# Patient Record
Sex: FEMALE | Race: WHITE | NOT HISPANIC OR LATINO | Employment: FULL TIME | URBAN - METROPOLITAN AREA
[De-identification: names, ages, dates, MRNs, and addresses within clinical notes are randomized per-mention and may not be internally consistent; named-entity substitution may affect disease eponyms.]

---

## 2017-04-06 ENCOUNTER — OFFICE VISIT (OUTPATIENT)
Dept: PEDIATRICS | Facility: CLINIC | Age: 27
End: 2017-04-06
Payer: COMMERCIAL

## 2017-04-06 VITALS
OXYGEN SATURATION: 99 % | WEIGHT: 165 LBS | TEMPERATURE: 97.8 F | HEART RATE: 78 BPM | SYSTOLIC BLOOD PRESSURE: 102 MMHG | HEIGHT: 66 IN | BODY MASS INDEX: 26.52 KG/M2 | DIASTOLIC BLOOD PRESSURE: 62 MMHG

## 2017-04-06 DIAGNOSIS — Z13.1 SCREENING FOR DIABETES MELLITUS: ICD-10-CM

## 2017-04-06 DIAGNOSIS — Z13.220 LIPID SCREENING: Primary | ICD-10-CM

## 2017-04-06 DIAGNOSIS — F32.5 MAJOR DEPRESSION IN COMPLETE REMISSION (H): ICD-10-CM

## 2017-04-06 PROCEDURE — 36415 COLL VENOUS BLD VENIPUNCTURE: CPT | Performed by: PEDIATRICS

## 2017-04-06 PROCEDURE — 82947 ASSAY GLUCOSE BLOOD QUANT: CPT | Performed by: PEDIATRICS

## 2017-04-06 PROCEDURE — 99213 OFFICE O/P EST LOW 20 MIN: CPT | Performed by: PEDIATRICS

## 2017-04-06 PROCEDURE — 80061 LIPID PANEL: CPT | Performed by: PEDIATRICS

## 2017-04-06 ASSESSMENT — ANXIETY QUESTIONNAIRES
6. BECOMING EASILY ANNOYED OR IRRITABLE: SEVERAL DAYS
GAD7 TOTAL SCORE: 1
5. BEING SO RESTLESS THAT IT IS HARD TO SIT STILL: NOT AT ALL
IF YOU CHECKED OFF ANY PROBLEMS ON THIS QUESTIONNAIRE, HOW DIFFICULT HAVE THESE PROBLEMS MADE IT FOR YOU TO DO YOUR WORK, TAKE CARE OF THINGS AT HOME, OR GET ALONG WITH OTHER PEOPLE: NOT DIFFICULT AT ALL
3. WORRYING TOO MUCH ABOUT DIFFERENT THINGS: NOT AT ALL
1. FEELING NERVOUS, ANXIOUS, OR ON EDGE: NOT AT ALL
2. NOT BEING ABLE TO STOP OR CONTROL WORRYING: NOT AT ALL
7. FEELING AFRAID AS IF SOMETHING AWFUL MIGHT HAPPEN: NOT AT ALL

## 2017-04-06 ASSESSMENT — PATIENT HEALTH QUESTIONNAIRE - PHQ9: 5. POOR APPETITE OR OVEREATING: NOT AT ALL

## 2017-04-06 NOTE — MR AVS SNAPSHOT
"              After Visit Summary   4/6/2017    Celia Castro    MRN: [id number]           Patient Information     Date Of Birth          1990        Visit Information        Provider Department      4/6/2017 1:40 PM Rissa Serrano MD [facility] [place]        Care Instructions    Okay to stay off prozac.    Watch for return of symptoms this fall and let me know if things are changing.    Exercise and diet!        Follow-ups after your visit        Who to contact     If you have questions or need follow up information about today's clinic visit or your schedule please contact [facility] directly at [phone].  Normal or non-critical lab and imaging results will be communicated to you by LLLerhart, letter or phone within 4 business days after the clinic has received the results. If you do not hear from us within 7 days, please contact the clinic through Postinit or phone. If you have a critical or abnormal lab result, we will notify you by phone as soon as possible.  Submit refill requests through XanEdu or call your pharmacy and they will forward the refill request to us. Please allow 3 business days for your refill to be completed.          Additional Information About Your Visit        MyChart Information     XanEdu gives you secure access to your electronic health record. If you see a primary care provider, you can also send messages to your care team and make appointments. If you have questions, please call your primary care clinic.  If you do not have a primary care provider, please call [phone] and they will assist you.        Care EveryWhere ID     This is your Care EveryWhere ID. This could be used by other organizations to access your [facility] medical records  [id number]        Your Vitals Were     Pulse Temperature Height Pulse Oximetry BMI (Body Mass Index)       78 97.8  F (36.6  C) (Oral) 5' 6\" (1.676 m) 99% 26.63 kg/m2        Blood Pressure from Last 3 Encounters: "   04/06/17 102/62   12/16/16 128/78   02/29/16 102/64    Weight from Last 3 Encounters:   04/06/17 165 lb (74.8 kg)   12/16/16 158 lb (71.7 kg)   02/29/16 153 lb 8 oz (69.6 kg)              Today, you had the following     No orders found for display       Primary Care Provider Office Phone # Fax #    Rissa Serrano [phone]       [facility] [place] [address] [place] [address]        Thank you!     Thank you for choosing [facility]  for your care. Our goal is always to provide you with excellent care. Hearing back from our patients is one way we can continue to improve our services. Please take a few minutes to complete the written survey that you may receive in the mail after your visit with us. Thank you!             Your Updated Medication List - Protect others around you: Learn how to safely use, store and throw away your medicines at www.disposemymeds.org.          This list is accurate as of: 4/6/17  2:01 PM.  Always use your most recent med list.                   Brand Name Dispense Instructions for use    acyclovir 400 MG tablet    ZOVIRAX    90 tablet    Take 1 tablet (400 mg) by mouth daily , 1 tab twice daily during outbreak       FLUoxetine 40 MG capsule    PROzac    90 capsule    Take 1 capsule (40 mg) by mouth daily       levonorgestrel 20 MCG/24HR IUD    MIRENA    1 each    1 each (20 mcg) by Intrauterine route once for 1 dose       MELATONIN PO          ondansetron 4 MG ODT tab    ZOFRAN ODT    20 tablet    Take 1-2 tablets (4-8 mg) by mouth every 8 hours as needed for nausea

## 2017-04-06 NOTE — PATIENT INSTRUCTIONS
Okay to stay off prozac.    Watch for return of symptoms this fall and let me know if things are changing.    Exercise and diet!

## 2017-04-06 NOTE — PROGRESS NOTES
"  SUBJECTIVE:                                                    Celia Castro is a 26 year old female who presents to clinic today for the following health issues:        Depression and Anxiety Follow-Up    Status since last visit: Improved - she stopped the medicine about a month ago because she ran out, but has actually been doing quite well off of it.  At first was a little tearful during a sad song, but even that is improving.  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  She is also doing a fitness challenge which is keeping her accountable to exercise.     Other associated symptoms: Feels she gets tearful easier, but feels it is going away    Complicating factors:     Significant life event: No     Current substance abuse: None    PHQ-9 SCORE 10/16/2015 2/29/2016 12/16/2016   Total Score - - -   Total Score MyChart - - -   Total Score 6 0 5     JUDAH-7 SCORE 11/7/2014 10/16/2015 12/16/2016   Total Score 1 - -   Total Score - 0 0        PHQ-9  English      PHQ-9   Any Language     GAD7       Amount of exercise or physical activity: 3-4 days/week for an average of 30-45 minutes    Problems taking medications regularly: NA    Medication side effects: None    Diet: regular (no restrictions)    Problem list and histories reviewed & adjusted, as indicated.  Additional history: as documented    Reviewed and updated as needed this visit by clinical staff       Reviewed and updated as needed this visit by Provider         ROS:  Constitutional, HEENT, cardiovascular, pulmonary, gi and gu systems are negative, except as otherwise noted.    OBJECTIVE:                                                    /62 (BP Location: Right arm, Cuff Size: Adult Regular)  Pulse 78  Temp 97.8  F (36.6  C) (Oral)  Ht 5' 6\" (1.676 m)  Wt 165 lb (74.8 kg)  SpO2 99%  BMI 26.63 kg/m2  Body mass index is 26.63 kg/(m^2).  NA    Diagnostic Test Results:  none  "     ASSESSMENT/PLAN:                                                        1. Lipid screening  - Lipid Profile (Chol, Trig, HDL, LDL calc)    2. Screening for diabetes mellitus  - Glucose    3. Major depression in complete remission (H)  Doing very well off prozac.  Will continues w/o medications. Declines therapy.  Discussed watching for mood changes in the fall.    Patient to return in 1-2 months for physical w/ PAP.  Doing fasting labs today.      Patient Instructions   Okay to stay off prozac.    Watch for return of symptoms this fall and let me know if things are changing.    Exercise and diet!      Rissa Serrano MD  [facility]

## 2017-04-06 NOTE — NURSING NOTE
"Chief Complaint   Patient presents with     Depression     Anxiety       Initial /62 (BP Location: Right arm, Cuff Size: Adult Regular)  Pulse 78  Temp 97.8  F (36.6  C) (Oral)  Ht 5' 6\" (1.676 m)  Wt 165 lb (74.8 kg)  SpO2 99%  BMI 26.63 kg/m2 Estimated body mass index is 26.63 kg/(m^2) as calculated from the following:    Height as of this encounter: 5' 6\" (1.676 m).    Weight as of this encounter: 165 lb (74.8 kg).  Medication Reconciliation: complete     Terri Prescott    "

## 2017-04-07 LAB
CHOLEST SERPL-MCNC: 213 MG/DL
GLUCOSE SERPL-MCNC: 80 MG/DL (ref 70–99)
HDLC SERPL-MCNC: 75 MG/DL
LDLC SERPL CALC-MCNC: 127 MG/DL
NONHDLC SERPL-MCNC: 138 MG/DL
TRIGL SERPL-MCNC: 53 MG/DL

## 2017-04-07 ASSESSMENT — PATIENT HEALTH QUESTIONNAIRE - PHQ9: SUM OF ALL RESPONSES TO PHQ QUESTIONS 1-9: 3

## 2017-04-07 ASSESSMENT — ANXIETY QUESTIONNAIRES: GAD7 TOTAL SCORE: 1

## 2017-07-10 ENCOUNTER — E-VISIT (OUTPATIENT)
Dept: PEDIATRICS | Facility: CLINIC | Age: 27
End: 2017-07-10
Payer: COMMERCIAL

## 2017-07-10 DIAGNOSIS — F32.1 MODERATE SINGLE CURRENT EPISODE OF MAJOR DEPRESSIVE DISORDER (H): Primary | ICD-10-CM

## 2017-07-10 DIAGNOSIS — F41.9 ANXIETY: ICD-10-CM

## 2017-07-10 PROCEDURE — 99444 ZZC PHYSICIAN ONLINE EVALUATION & MANAGEMENT SERVICE: CPT | Performed by: PEDIATRICS

## 2017-07-11 PROBLEM — F32.1 MODERATE SINGLE CURRENT EPISODE OF MAJOR DEPRESSIVE DISORDER (H): Status: ACTIVE | Noted: 2017-07-11

## 2017-07-11 PROBLEM — F32.5 MAJOR DEPRESSION IN COMPLETE REMISSION (H): Status: RESOLVED | Noted: 2017-04-06 | Resolved: 2017-07-11

## 2017-07-16 DIAGNOSIS — R11.0 NAUSEA: ICD-10-CM

## 2017-07-16 NOTE — TELEPHONE ENCOUNTER
ondansetron (ZOFRAN ODT) 4 MG disintegrating tablet     Last Written Prescription Date: 10/31/2016  Last Fill Quantity: 20,  # refills: 1   Last Office Visit with [facility], [facility] or [facility] prescribing provider: 4/6/2017

## 2017-07-18 RX ORDER — ONDANSETRON 4 MG/1
4-8 TABLET, ORALLY DISINTEGRATING ORAL EVERY 8 HOURS PRN
Qty: 20 TABLET | Refills: 1 | Status: SHIPPED | OUTPATIENT
Start: 2017-07-18 | End: 2018-06-10

## 2017-07-18 NOTE — TELEPHONE ENCOUNTER
Routing refill request to provider for review/approval because:  A break in medication, please sign for PCP if ok.  Yue Nix, RN  Triage Nurse

## 2017-09-16 DIAGNOSIS — F41.9 ANXIETY: ICD-10-CM

## 2017-09-16 DIAGNOSIS — F32.1 MODERATE SINGLE CURRENT EPISODE OF MAJOR DEPRESSIVE DISORDER (H): ICD-10-CM

## 2017-09-16 NOTE — TELEPHONE ENCOUNTER
FLUoxetine (PROZAC) 20 MG      Last Written Prescription Date: 7/11/17  Last Fill Quantity: 30, # refills: 1  Last Office Visit with [facility] primary care provider:  4/6/17        Last PHQ-9 score on record=   PHQ-9 SCORE 4/6/2017   Total Score -   Total Score MyChart -   Total Score 3

## 2017-09-19 NOTE — TELEPHONE ENCOUNTER
Prescription approved per [facility] Refill Protocol.  Jeannie Alva RN

## 2017-09-24 ENCOUNTER — HEALTH MAINTENANCE LETTER (OUTPATIENT)
Age: 27
End: 2017-09-24

## 2017-11-13 DIAGNOSIS — F41.9 ANXIETY: ICD-10-CM

## 2017-11-13 DIAGNOSIS — F32.1 MODERATE SINGLE CURRENT EPISODE OF MAJOR DEPRESSIVE DISORDER (H): ICD-10-CM

## 2017-11-15 NOTE — TELEPHONE ENCOUNTER
Fluoxetine     Last Written Prescription Date: 9/19/2017  Last Fill Quantity: 30, # refills: 1  Last Office Visit with [facility] primary care provider:  E-visit 7/10/2017        Last PHQ-9 score on record=   PHQ-9 SCORE 4/6/2017   Total Score -   Total Score MyChart -   Total Score 3         Medication is being filled for 1 time refill only due to:  Patient needs to be seen because due for Depression and BP follow up per LOV note.     Prescription approved per [facility] Refill Protocol.    Ping CH RN, BSN, PHN  Gabbie Ross RN

## 2017-11-16 NOTE — TELEPHONE ENCOUNTER
Pt calling back, notified as below. Made an appointment with  on 11/29. Pt has enough med on hand to last till her appointment.     Suhail, RN  Triage Nurse

## 2017-11-29 ENCOUNTER — OFFICE VISIT (OUTPATIENT)
Dept: PEDIATRICS | Facility: CLINIC | Age: 27
End: 2017-11-29
Payer: COMMERCIAL

## 2017-11-29 VITALS
TEMPERATURE: 98.2 F | HEIGHT: 66 IN | DIASTOLIC BLOOD PRESSURE: 72 MMHG | OXYGEN SATURATION: 98 % | WEIGHT: 171 LBS | BODY MASS INDEX: 27.48 KG/M2 | HEART RATE: 88 BPM | SYSTOLIC BLOOD PRESSURE: 118 MMHG

## 2017-11-29 DIAGNOSIS — F41.9 ANXIETY: ICD-10-CM

## 2017-11-29 DIAGNOSIS — F32.1 MODERATE SINGLE CURRENT EPISODE OF MAJOR DEPRESSIVE DISORDER (H): ICD-10-CM

## 2017-11-29 PROCEDURE — 99213 OFFICE O/P EST LOW 20 MIN: CPT | Performed by: PEDIATRICS

## 2017-11-29 RX ORDER — MULTIPLE VITAMINS W/ MINERALS TAB 9MG-400MCG
1 TAB ORAL DAILY
COMMUNITY
End: 2019-08-05

## 2017-11-29 ASSESSMENT — ANXIETY QUESTIONNAIRES
1. FEELING NERVOUS, ANXIOUS, OR ON EDGE: NOT AT ALL
IF YOU CHECKED OFF ANY PROBLEMS ON THIS QUESTIONNAIRE, HOW DIFFICULT HAVE THESE PROBLEMS MADE IT FOR YOU TO DO YOUR WORK, TAKE CARE OF THINGS AT HOME, OR GET ALONG WITH OTHER PEOPLE: NOT DIFFICULT AT ALL
7. FEELING AFRAID AS IF SOMETHING AWFUL MIGHT HAPPEN: NOT AT ALL
5. BEING SO RESTLESS THAT IT IS HARD TO SIT STILL: NOT AT ALL
GAD7 TOTAL SCORE: 0
3. WORRYING TOO MUCH ABOUT DIFFERENT THINGS: NOT AT ALL
6. BECOMING EASILY ANNOYED OR IRRITABLE: NOT AT ALL
2. NOT BEING ABLE TO STOP OR CONTROL WORRYING: NOT AT ALL

## 2017-11-29 ASSESSMENT — PATIENT HEALTH QUESTIONNAIRE - PHQ9
5. POOR APPETITE OR OVEREATING: NOT AT ALL
SUM OF ALL RESPONSES TO PHQ QUESTIONS 1-9: 2

## 2017-11-29 NOTE — MR AVS SNAPSHOT
"              After Visit Summary   11/29/2017    Celia Castro    MRN: [id number]           Patient Information     Date Of Birth          1990        Visit Information        Provider Department      11/29/2017 11:00 AM [facility]        Today's Diagnoses     Anxiety        Moderate single current episode of major depressive disorder (H)          Care Instructions    Schedule PAP with [facility]    Follow up in 6 months.          Follow-ups after your visit        Who to contact     If you have questions or need follow up information about today's clinic visit or your schedule please contact [facility] directly at [phone].  Normal or non-critical lab and imaging results will be communicated to you by InboundWriterhart, letter or phone within 4 business days after the clinic has received the results. If you do not hear from us within 7 days, please contact the clinic through InboundWriterhart or phone. If you have a critical or abnormal lab result, we will notify you by phone as soon as possible.  Submit refill requests through Droid system master or call your pharmacy and they will forward the refill request to us. Please allow 3 business days for your refill to be completed.          Additional Information About Your Visit        MyChart Information     Droid system master gives you secure access to your electronic health record. If you see a primary care provider, you can also send messages to your care team and make appointments. If you have questions, please call your primary care clinic.  If you do not have a primary care provider, please call [phone] and they will assist you.        Care EveryWhere ID     This is your Care EveryWhere ID. This could be used by other organizations to access your [facility] medical records  [id number]        Your Vitals Were     Pulse Temperature Height Pulse Oximetry BMI (Body Mass Index)       88 98.2  F (36.8  C) (Oral) 5' 6\" (1.676 m) 98% 27.6 kg/m2     "    Blood Pressure from Last 3 Encounters:   11/29/17 118/72   04/06/17 102/62   12/16/16 128/78    Weight from Last 3 Encounters:   11/29/17 171 lb (77.6 kg)   04/06/17 165 lb (74.8 kg)   12/16/16 158 lb (71.7 kg)              Today, you had the following     No orders found for display         Where to get your medicines      These medications were sent to [facility]pharm[facility] #[id number] - [place], [place] - [address], [place] [address]     Phone:  [phone]     FLUoxetine 20 MG capsule          Primary Care Provider Office Phone # Fax #    Rissa Serrano [phone]       [facility] [place] [address]        Equal Access to Services     YEN FARLEY : Hadii avelina louieo Somayelin, waaxda luqadaha, qaybta kaalmada adesilvestreyada, jayashree nunes . So [facility] [phone].    ATENCIÓN: Si habla español, tiene a hastings disposición servicios gratuitos de asistencia lingüística. [facility] [phone].    We comply with applicable federal civil rights laws and [place] laws. We do not discriminate on the basis of race, color, national origin, age, disability, sex, sexual orientation, or gender identity.            Thank you!     Thank you for choosing [facility]  for your care. Our goal is always to provide you with excellent care. Hearing back from our patients is one way we can continue to improve our services. Please take a few minutes to complete the written survey that you may receive in the mail after your visit with us. Thank you!             Your Updated Medication List - Protect others around you: Learn how to safely use, store and throw away your medicines at www.disposemymeds.org.          This list is accurate as of: 11/29/17 11:24 AM.  Always use your most recent med list.                   Brand Name Dispense Instructions for use Diagnosis    acyclovir 400 MG tablet    ZOVIRAX    90 tablet    Take 1 tablet (400 mg) by  mouth daily , 1 tab twice daily during outbreak    HSV-2 infection       CALCIUM 600 PO      Take 1 tablet by mouth daily        FISH OIL PO      Take 1 capsule by mouth daily        * FLUoxetine 40 MG capsule    PROzac    90 capsule    Take 1 capsule (40 mg) by mouth daily    Anxiety       * FLUoxetine 20 MG capsule    PROzac    90 capsule    Take 1 capsule (20 mg) by mouth daily    Anxiety, Moderate single current episode of major depressive disorder (H)       levonorgestrel 20 MCG/24HR IUD    MIRENA    1 each    1 each (20 mcg) by Intrauterine route once for 1 dose    Encounter for IUD insertion       MELATONIN PO           Multi-vitamin Tabs tablet      Take 1 tablet by mouth daily        ondansetron 4 MG ODT tab    ZOFRAN ODT    20 tablet    Take 1-2 tablets (4-8 mg) by mouth every 8 hours as needed for nausea    Nausea       PROBIOTIC COLON SUPPORT PO      Take 1 tablet by mouth daily        VITAMIN D PO      Take 1,000 Int'l Units by mouth daily        * Notice:  This list has 2 medication(s) that are the same as other medications prescribed for you. Read the directions carefully, and ask your doctor or other care provider to review them with you.

## 2017-11-29 NOTE — NURSING NOTE
"Chief Complaint   Patient presents with     Recheck Medication       Initial /72 (BP Location: Right arm, Cuff Size: Adult Regular)  Pulse 88  Temp 98.2  F (36.8  C) (Oral)  Ht 5' 6\" (1.676 m)  Wt 171 lb (77.6 kg)  SpO2 98%  BMI 27.6 kg/m2 Estimated body mass index is 27.6 kg/(m^2) as calculated from the following:    Height as of this encounter: 5' 6\" (1.676 m).    Weight as of this encounter: 171 lb (77.6 kg).  Medication Reconciliation: complete   Sujata Pratt MA    "

## 2017-11-29 NOTE — PROGRESS NOTES
"  SUBJECTIVE:   Celia Castro is a 26 year old female who presents to clinic today for the following health issues:      Medication Followup of  Prozac    Taking Medication as prescribed: yes    Side Effects:  None    Medication Helping Symptoms:  yes     Patient restarted medication this summer after we had weaned off.  Feels that 20mg is just enough.  Was previously crying for no reason and poor motivation.  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 Does yoga for stress relief.     Problem list and histories reviewed & adjusted, as indicated.  Additional history: as documented    Reviewed and updated as needed this visit by clinical staff     Reviewed and updated as needed this visit by Provider         ROS:  Constitutional, HEENT, cardiovascular, pulmonary, gi and gu systems are negative, except as otherwise noted.      OBJECTIVE:   /72 (BP Location: Right arm, Cuff Size: Adult Regular)  Pulse 88  Temp 98.2  F (36.8  C) (Oral)  Ht 5' 6\" (1.676 m)  Wt 171 lb (77.6 kg)  SpO2 98%  BMI 27.6 kg/m2  Body mass index is 27.6 kg/(m^2).  GENERAL APPEARANCE: healthy, alert and no distress  CV: regular rates and rhythm, normal S1 S2, no S3 or S4 and no murmur, click or rub    Diagnostic Test Results:  none     ASSESSMENT/PLAN:       1. Anxiety  Controlled, continue and follow up in 6 months  - FLUoxetine (PROZAC) 20 MG capsule; Take 1 capsule (20 mg) by mouth daily  Dispense: 90 capsule; Refill: 1    2. Moderate single current episode of major depressive disorder (H)  Controlled and follow up in 6 months.  - FLUoxetine (PROZAC) 20 MG capsule; Take 1 capsule (20 mg) by mouth daily  Dispense: 90 capsule; Refill: 1    Patient Instructions   [facility]    Follow up in 6 months.      Rissa Serrano MD  [facility]    "

## 2017-11-30 ASSESSMENT — ANXIETY QUESTIONNAIRES: GAD7 TOTAL SCORE: 0

## 2018-01-09 DIAGNOSIS — B00.9 HSV-2 INFECTION: ICD-10-CM

## 2018-01-09 RX ORDER — ACYCLOVIR 400 MG/1
400 TABLET ORAL DAILY
Qty: 30 TABLET | Refills: 0 | Status: SHIPPED | OUTPATIENT
Start: 2018-01-09 | End: 2018-03-27

## 2018-03-06 ENCOUNTER — TELEPHONE (OUTPATIENT)
Dept: NURSING | Facility: CLINIC | Age: 28
End: 2018-03-06

## 2018-03-06 DIAGNOSIS — B00.9 HSV-2 INFECTION: ICD-10-CM

## 2018-03-06 RX ORDER — ACYCLOVIR 400 MG/1
400 TABLET ORAL DAILY
Qty: 30 TABLET | Refills: 0 | OUTPATIENT
Start: 2018-03-06

## 2018-03-06 NOTE — TELEPHONE ENCOUNTER
ACYCLOVIR 400MG      Last Written Prescription Date:  1/9/18  Last Fill Quantity: 30,   # refills: 0  Last Office Visit: 12/16/16  Future Office visit:   NONE    Pt due for annual, no appt scheduled. Pt already received one month extension. Rx denied.

## 2018-03-27 ENCOUNTER — OFFICE VISIT (OUTPATIENT)
Dept: OBGYN | Facility: CLINIC | Age: 28
End: 2018-03-27
Payer: COMMERCIAL

## 2018-03-27 VITALS
WEIGHT: 170 LBS | BODY MASS INDEX: 27.32 KG/M2 | SYSTOLIC BLOOD PRESSURE: 110 MMHG | HEART RATE: 76 BPM | HEIGHT: 66 IN | DIASTOLIC BLOOD PRESSURE: 60 MMHG

## 2018-03-27 DIAGNOSIS — Z11.8 SPECIAL SCREENING EXAMINATION FOR CHLAMYDIAL DISEASE: ICD-10-CM

## 2018-03-27 DIAGNOSIS — Z01.419 ENCOUNTER FOR GYNECOLOGICAL EXAMINATION WITHOUT ABNORMAL FINDING: Primary | ICD-10-CM

## 2018-03-27 DIAGNOSIS — Z11.3 SCREEN FOR SEXUALLY TRANSMITTED DISEASES: ICD-10-CM

## 2018-03-27 DIAGNOSIS — B00.9 HSV-2 INFECTION: ICD-10-CM

## 2018-03-27 PROCEDURE — G0145 SCR C/V CYTO,THINLAYER,RESCR: HCPCS | Performed by: NURSE PRACTITIONER

## 2018-03-27 PROCEDURE — 87591 N.GONORRHOEAE DNA AMP PROB: CPT | Performed by: NURSE PRACTITIONER

## 2018-03-27 PROCEDURE — 99395 PREV VISIT EST AGE 18-39: CPT | Performed by: NURSE PRACTITIONER

## 2018-03-27 PROCEDURE — 87491 CHLMYD TRACH DNA AMP PROBE: CPT | Performed by: NURSE PRACTITIONER

## 2018-03-27 RX ORDER — ACYCLOVIR 400 MG/1
400 TABLET ORAL DAILY
Qty: 90 TABLET | Refills: 3 | Status: SHIPPED | OUTPATIENT
Start: 2018-03-27 | End: 2019-04-28

## 2018-03-27 ASSESSMENT — ANXIETY QUESTIONNAIRES
3. WORRYING TOO MUCH ABOUT DIFFERENT THINGS: NOT AT ALL
2. NOT BEING ABLE TO STOP OR CONTROL WORRYING: NOT AT ALL
IF YOU CHECKED OFF ANY PROBLEMS ON THIS QUESTIONNAIRE, HOW DIFFICULT HAVE THESE PROBLEMS MADE IT FOR YOU TO DO YOUR WORK, TAKE CARE OF THINGS AT HOME, OR GET ALONG WITH OTHER PEOPLE: NOT DIFFICULT AT ALL
7. FEELING AFRAID AS IF SOMETHING AWFUL MIGHT HAPPEN: NOT AT ALL
1. FEELING NERVOUS, ANXIOUS, OR ON EDGE: SEVERAL DAYS
5. BEING SO RESTLESS THAT IT IS HARD TO SIT STILL: NOT AT ALL
GAD7 TOTAL SCORE: 1
6. BECOMING EASILY ANNOYED OR IRRITABLE: NOT AT ALL

## 2018-03-27 ASSESSMENT — PATIENT HEALTH QUESTIONNAIRE - PHQ9: 5. POOR APPETITE OR OVEREATING: NOT AT ALL

## 2018-03-27 NOTE — LETTER
April 1, 2018      Celia Castro  [address]  [facility] [address]    Dear ,      I am happy to inform you that your recent cervical cancer screening test (PAP smear) was normal.      Preventative screenings such as this help to ensure your health for years to come. You should repeat a pap smear in 1 year, unless otherwise directed.      You will still need to return to the clinic every year for your annual exam and other preventive tests.     Please contact the clinic at [phone] if you have further questions.       Sincerely,      Rema Valadez, APRN CNP/rlm
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
## 2018-03-27 NOTE — MR AVS SNAPSHOT
After Visit Summary   3/27/2018    Celia Castro    MRN: [id number]           Patient Information     Date Of Birth          1990        Visit Information        Provider Department      3/27/2018 10:00 AM Rema Valadez APRN CNP [facility]        Today's Diagnoses     Encounter for gynecological examination without abnormal finding    -  1    HSV-2 infection        Special screening examination for chlamydial disease        Screen for sexually transmitted diseases           Follow-ups after your visit        Follow-up notes from your care team     Return in about 1 year (around 3/27/2019).      Who to contact     If you have questions or need follow up information about today's clinic visit or your schedule please contact [facility] directly at [phone].  Normal or non-critical lab and imaging results will be communicated to you by [employer], letter or phone within 4 business days after the clinic has received the results. If you do not hear from us within 7 days, please contact the clinic through [employer] or phone. If you have a critical or abnormal lab result, we will notify you by phone as soon as possible.  Submit refill requests through [employer] or call your pharmacy and they will forward the refill request to us. Please allow 3 business days for your refill to be completed.          Additional Information About Your Visit        MyChart Information     [employer] gives you secure access to your electronic health record. If you see a primary care provider, you can also send messages to your care team and make appointments. If you have questions, please call your primary care clinic.  If you do not have a primary care provider, please call [phone] and they will assist you.        Care EveryWhere ID     This is your Care EveryWhere ID. This could be used by other organizations to access your [facility] medical records  [id number]        Your Vitals  "Were     Pulse Height BMI (Body Mass Index)             76 5' 6\" (1.676 m) 27.44 kg/m2          Blood Pressure from Last 3 Encounters:   03/27/18 110/60   11/29/17 118/72   04/06/17 102/62    Weight from Last 3 Encounters:   03/27/18 170 lb (77.1 kg)   11/29/17 171 lb (77.6 kg)   04/06/17 165 lb (74.8 kg)              We Performed the Following     CHLAMYDIA TRACHOMATIS PCR     NEISSERIA GONORRHOEA PCR     Pap imaged thin layer screen reflex to HPV if ASCUS - recommended age 25 - 29 years          Where to get your medicines      These medications were sent to [id number] - [place], [place] - [address], [place] [address]     Phone:  [phone]     acyclovir 400 MG tablet          Primary Care Provider Office Phone # Fax #    Rissa Serrano [phone] [address] [place] [address]        Equal Access to Services     [facility]: Hadii aad ku hadasho Soomaali, waaxda luqadaha, qaybta kaalmada adeegyada, waxay vernain haymarielena nunes . So [facility] [phone].    ATENCIÓN: Si habla español, tiene a hastings disposición servicios gratuitos de asistencia lingüística. [facility] [phone].    We comply with applicable federal civil rights laws and [place] laws. We do not discriminate on the basis of race, color, national origin, age, disability, sex, sexual orientation, or gender identity.            Thank you!     Thank you for choosing [facility] [place]  for your care. Our goal is always to provide you with excellent care. Hearing back from our patients is one way we can continue to improve our services. Please take a few minutes to complete the written survey that you may receive in the mail after your visit with us. Thank you!             Your Updated Medication List - Protect others around you: Learn how to safely use, store and throw away your medicines at www.disposemymeds.org.          This list is accurate " as of 3/27/18 10:34 AM.  Always use your most recent med list.                   Brand Name Dispense Instructions for use Diagnosis    acyclovir 400 MG tablet    ZOVIRAX    90 tablet    Take 1 tablet (400 mg) by mouth daily , 1 tab twice daily during outbreak    HSV-2 infection       CALCIUM 600 PO      Take 1 tablet by mouth daily        FISH OIL PO      Take 1 capsule by mouth daily        FLUoxetine 20 MG capsule    PROzac    90 capsule    Take 1 capsule (20 mg) by mouth daily    Anxiety, Moderate single current episode of major depressive disorder (H)       levonorgestrel 20 MCG/24HR IUD    MIRENA    1 each    1 each (20 mcg) by Intrauterine route once for 1 dose    Encounter for IUD insertion       MELATONIN PO           Multi-vitamin Tabs tablet      Take 1 tablet by mouth daily        ondansetron 4 MG ODT tab    ZOFRAN ODT    20 tablet    Take 1-2 tablets (4-8 mg) by mouth every 8 hours as needed for nausea    Nausea       PROBIOTIC COLON SUPPORT PO      Take 1 tablet by mouth daily        VITAMIN D PO      Take 1,000 Int'l Units by mouth daily

## 2018-03-28 LAB
C TRACH DNA SPEC QL NAA+PROBE: NEGATIVE
N GONORRHOEA DNA SPEC QL NAA+PROBE: NEGATIVE
SPECIMEN SOURCE: NORMAL
SPECIMEN SOURCE: NORMAL

## 2018-03-28 ASSESSMENT — ANXIETY QUESTIONNAIRES: GAD7 TOTAL SCORE: 1

## 2018-03-28 ASSESSMENT — PATIENT HEALTH QUESTIONNAIRE - PHQ9: SUM OF ALL RESPONSES TO PHQ QUESTIONS 1-9: 6

## 2018-03-30 LAB
COPATH REPORT: NORMAL
PAP: NORMAL

## 2018-05-09 ENCOUNTER — OFFICE VISIT (OUTPATIENT)
Dept: URGENT CARE | Facility: URGENT CARE | Age: 28
End: 2018-05-09
Payer: COMMERCIAL

## 2018-05-09 VITALS
SYSTOLIC BLOOD PRESSURE: 108 MMHG | HEART RATE: 85 BPM | WEIGHT: 172.1 LBS | DIASTOLIC BLOOD PRESSURE: 76 MMHG | TEMPERATURE: 97.9 F | BODY MASS INDEX: 27.78 KG/M2 | RESPIRATION RATE: 20 BRPM

## 2018-05-09 DIAGNOSIS — M79.674 GREAT TOE PAIN, RIGHT: Primary | ICD-10-CM

## 2018-05-09 LAB — URATE SERPL-MCNC: 4.9 MG/DL (ref 2.6–6)

## 2018-05-09 PROCEDURE — 84550 ASSAY OF BLOOD/URIC ACID: CPT | Performed by: PHYSICIAN ASSISTANT

## 2018-05-09 PROCEDURE — 99213 OFFICE O/P EST LOW 20 MIN: CPT | Performed by: PHYSICIAN ASSISTANT

## 2018-05-09 PROCEDURE — 36415 COLL VENOUS BLD VENIPUNCTURE: CPT | Performed by: PHYSICIAN ASSISTANT

## 2018-05-09 RX ORDER — INDOMETHACIN 50 MG/1
50 CAPSULE ORAL
Qty: 30 CAPSULE | Refills: 0 | Status: SHIPPED | OUTPATIENT
Start: 2018-05-09 | End: 2018-10-09

## 2018-05-09 NOTE — PATIENT INSTRUCTIONS
(L69.604) Great toe pain, right  (primary encounter diagnosis)  Comment: consistent with gout  Plan: Uric acid, indomethacin (INDOCIN) 50 MG capsule                  Gout    Gout is an inflammation of a joint due to a build-up of gout crystals in the joint fluid. This occurs when there is an excess of uric acid (a normal waste product) in the body. Uric acid builds up in the body when the kidneys are unable to filter enough of it from the blood. This may occur with age. It is also associated with kidney disease. Gout occurs more often in people with obesity, diabetes, high blood pressure, or high levels of fats in the blood. It may run in families. Gout tends to come and go. A flare up of gout is called an attack. Drinking alcohol or eating certain foods (such as shellfish or foods with additives such as high-fructose corn syrup) may increase uric acid levels in the blood and cause a gout attack.  During a gout attack, the affected joint may become a hot, red, swollen and painful. If you have had one attack of gout, you are likely to have another. An attack of gout can be treated with medicine. If these attacks become frequent, a daily medicine may be prescribed to help the kidneys remove uric acid from the body.  Home care  During a gout attack:    Rest painful joints. If gout affects the joints of your foot or leg, you may want to use crutches for the first few days to keep from bearing weight on the affected joint.    When sitting or lying down, raise the painful joint to a level higher than your heart.    Apply an ice pack (ice cubes in a plastic bag wrapped in a thin towel) over the injured area for 20 minutes every 1 to 2 hours the first day for pain relief. Continue this 3 to 4 times a day for swelling and pain.    Avoid alcohol and foods listed below (see Preventing attacks) during a gout attack. Drink extra fluid to help flush the uric acid through your kidneys.    If you were prescribed a medicine to treat  gout, take it as your healthcare provider has instructed. Don't skip doses.    Take anti-inflammatory medicine as directed.     If pain medicines have been prescribed, take them exactly as directed.    Preventing attacks    Minimize or avoid alcohol use. Excess alcohol intake can cause a gout attack.    Limit these foods and beverages:  ? Organ meats, such as kidneys and liver  ? Certain seafoods (anchovies, sardines, shrimp, scallops, herring, mackerel)  ? Wild game, meat extracts and meat gravies  ? Foods and beverages sweetened with high-fructose corn syrup, such as sodas    Eat a healthy diet including low-fat and nonfat dairy, whole grains, and vegetables.    If you are overweight, talk to your healthcare provider about a weight reduction plan. Avoid fasting or extreme low calorie diets (less than 900 calories per day). This will increase uric acid levels in the body.    If you have diabetes or high blood pressure, work with your doctor to manage these conditions.    Protect the joint from injury. Trauma can trigger a gout attack.  Follow-up care  Follow up with your healthcare provider, or as advised.   When to seek medical advice  Call your healthcare provider if you have any of the following:    Fever over 100.4 F (38. C) with worsening joint pain    Increasing redness around the joint    Pain developing in another joint    Repeated vomiting, abdominal pain, or blood in the vomit or stool (black or red color)  Date Last Reviewed: 3/1/2017    1302-1249 [employer]. [address] [place], [place] [address]. All rights reserved. This information is not intended as a substitute for professional medical care. Always follow your healthcare professional's instructions.        Gout Diet  Gout is a painful condition caused by an excess of uric acid, a waste product made by the body. Uric acid forms crystals that collect in the joints. The immune response to these crystals brings on symptoms of joint  pain and swelling. This is called a gout attack. Often, medications and diet changes are combined to manage gout. Below are some guidelines for changing your diet to help you manage gout and prevent attacks. Your health care provider will help you determine the best eating plan for you.     Eating to manage gout  Weight loss for those who are overweight may help reduce gout attacks.  Eat less of these foods  Eating too many foods containing purines may raise the levels of uric acid in your body. This raises your risk for a gout attack. Try to limit these foods and drinks:    Alcohol, such as beer and red wine. You may be told to avoid alcohol completely.    Soft drinks that contain sugar or high fructose corn syrup    Certain fish, including anchovies, sardines, fish eggs, and herring    Shellfish    Certain meats, such as red meat, hot dogs, luncheon meats, and turkey    Organ meats, such as liver, kidneys, and sweetbreads    Legumes, such as dried beans and peas    Other high fat foods such as gravy, whole milk, and high fat cheeses    Vegetables such as asparagus, cauliflower, spinach, and mushrooms used to be thought to contribute to an increased risk for a gout attack, but recent studies show that high purine vegetables don't increase the risk for a gout attack.  Eat more of these foods  Other foods may be helpful for people with gout. Add some of these foods to your diet:    Cherries contain chemicals that may lower uric acid.    Omega fatty acids. These are found in some fatty fish such as salmon, certain oils (flax, olive, or nut), and nuts themselves. Omega fatty acids may help prevent inflammation due to gout.    Dairy products that are low-fat or fat-free, such as cheese and yogurt    Complex carbohydrate foods, including whole grains, brown rice, oats, and beans    Coffee, in moderation    Water, approximately 64 ounces per day  Follow-up care  Follow up with your healthcare provider as advised.  When to  seek medical advice  Call your healthcare provider right away if any of these occur:    Return of gout symptoms, usually at night:    Severe pain, swelling, and heat in a joint, especially the base of the big toe    Affected joint is hard to move    Skin of the affected joint is purple or red    Fever of 100.4 F (38 C) or higher    Pain that doesn't get better even with prescribed medicine   Date Last Reviewed: 1/12/2016 2000-2017 [employer]. [address] [place] [address]. All rights reserved. This information is not intended as a substitute for professional medical care. Always follow your healthcare professional's instructions.

## 2018-05-09 NOTE — LETTER
[facility] [facility]  [address] [address]  [phone]      May 9, 2018    RE:  Celia Castro                                                                                                                                                       [address]  [facility] [place] [address]            To whom it may concern:    Celia Castro was seen in clinic today.            Sincerely,        Alethea Banks PAC    [facility]

## 2018-05-09 NOTE — PROGRESS NOTES
SUBJECTIVE:  Chief Complaint   Patient presents with     Toe Injury     Lt big toe pain and swelling x about 2 days      Celia Castro is a 27 year old female presents with a chief complaint of left great toe pain for 2 days. No trauma.    No rash or open sores/wounds.    She is otherwise in her usual state of health.      FH: positive for Gout.      Past Medical History:   Diagnosis Date     Major depressive disorder, single episode, moderate (H) 9/07     Patient Active Problem List   Diagnosis     CARDIOVASCULAR SCREENING; LDL GOAL LESS THAN 160     Anxiety     Encounter for IUD insertion     Moderate single current episode of major depressive disorder (H)     Social History   Substance Use Topics     Smoking status: Former Smoker     Quit date: 11/12/2014     Smokeless tobacco: Never Used      Comment: socially      Alcohol use 1.2 oz/week     2 Standard drinks or equivalent per week      Comment: socailly        ROS:  CONSTITUTIONAL:NEGATIVE for fever, chills, change in weight  INTEGUMENTARY/SKIN: NEGATIVE for worrisome rashes, moles or lesions  MUSCULOSKELETAL: as per HPI  NEURO: NEGATIVE for weakness, dizziness or paresthesias    EXAM:   /76  Pulse 85  Temp 97.9  F (36.6  C) (Oral)  Resp 20  Wt 172 lb 1.6 oz (78.1 kg)  BMI 27.78 kg/m2  Gen: healthy,alert,no distress  Extremity: left great toe: tenderness to palpation oat first MTP.  Subtle warmth.    SKIN: no erythema.  No open sores.  No rash.     There is not compromise to the distal circulation.  NEURO: Normal strength and tone, sensory exam grossly normal, mentation intact and speech normal    X-RAY was not done.    (M79.674) Great toe pain, right  (primary encounter diagnosis)  Comment: consistent with Gout.    Plan: Uric acid, indomethacin (INDOCIN) 50 MG capsule        Handout on Gout given and reviewed.    Patient expresses understanding and agreement with the assessment and plan as above.

## 2018-05-09 NOTE — MR AVS SNAPSHOT
After Visit Summary   5/9/2018    Celia Castro    MRN: [id number]           Patient Information     Date Of Birth          1990        Visit Information        Provider Department      5/9/2018 10:50 AM Alethea Solano PA-C [facility]        Today's Diagnoses     Great toe pain, right    -  1      Care Instructions    (M79.674) Great toe pain, right  (primary encounter diagnosis)  Comment: consistent with gout  Plan: Uric acid, indomethacin (INDOCIN) 50 MG capsule                  Gout    Gout is an inflammation of a joint due to a build-up of gout crystals in the joint fluid. This occurs when there is an excess of uric acid (a normal waste product) in the body. Uric acid builds up in the body when the kidneys are unable to filter enough of it from the blood. This may occur with age. It is also associated with kidney disease. Gout occurs more often in people with obesity, diabetes, high blood pressure, or high levels of fats in the blood. It may run in families. Gout tends to come and go. A flare up of gout is called an attack. Drinking alcohol or eating certain foods (such as shellfish or foods with additives such as high-fructose corn syrup) may increase uric acid levels in the blood and cause a gout attack.  During a gout attack, the affected joint may become a hot, red, swollen and painful. If you have had one attack of gout, you are likely to have another. An attack of gout can be treated with medicine. If these attacks become frequent, a daily medicine may be prescribed to help the kidneys remove uric acid from the body.  Home care  During a gout attack:    Rest painful joints. If gout affects the joints of your foot or leg, you may want to use crutches for the first few days to keep from bearing weight on the affected joint.    When sitting or lying down, raise the painful joint to a level higher than your heart.    Apply an ice pack (ice cubes in a  plastic bag wrapped in a thin towel) over the injured area for 20 minutes every 1 to 2 hours the first day for pain relief. Continue this 3 to 4 times a day for swelling and pain.    Avoid alcohol and foods listed below (see Preventing attacks) during a gout attack. Drink extra fluid to help flush the uric acid through your kidneys.    If you were prescribed a medicine to treat gout, take it as your healthcare provider has instructed. Don't skip doses.    Take anti-inflammatory medicine as directed.     If pain medicines have been prescribed, take them exactly as directed.    Preventing attacks    Minimize or avoid alcohol use. Excess alcohol intake can cause a gout attack.    Limit these foods and beverages:  ? Organ meats, such as kidneys and liver  ? Certain seafoods (anchovies, sardines, shrimp, scallops, herring, mackerel)  ? Wild game, meat extracts and meat gravies  ? Foods and beverages sweetened with high-fructose corn syrup, such as sodas    Eat a healthy diet including low-fat and nonfat dairy, whole grains, and vegetables.    If you are overweight, talk to your healthcare provider about a weight reduction plan. Avoid fasting or extreme low calorie diets (less than 900 calories per day). This will increase uric acid levels in the body.    If you have diabetes or high blood pressure, work with your doctor to manage these conditions.    Protect the joint from injury. Trauma can trigger a gout attack.  Follow-up care  Follow up with your healthcare provider, or as advised.   When to seek medical advice  Call your healthcare provider if you have any of the following:    Fever over 100.4 F (38. C) with worsening joint pain    Increasing redness around the joint    Pain developing in another joint    Repeated vomiting, abdominal pain, or blood in the vomit or stool (black or red color)  Date Last Reviewed: 3/1/2017    9071-3613 [employer]. [address] [place] [address], [place] [address]. All rights  reserved. This information is not intended as a substitute for professional medical care. Always follow your healthcare professional's instructions.        Gout Diet  Gout is a painful condition caused by an excess of uric acid, a waste product made by the body. Uric acid forms crystals that collect in the joints. The immune response to these crystals brings on symptoms of joint pain and swelling. This is called a gout attack. Often, medications and diet changes are combined to manage gout. Below are some guidelines for changing your diet to help you manage gout and prevent attacks. Your health care provider will help you determine the best eating plan for you.     Eating to manage gout  Weight loss for those who are overweight may help reduce gout attacks.  Eat less of these foods  Eating too many foods containing purines may raise the levels of uric acid in your body. This raises your risk for a gout attack. Try to limit these foods and drinks:    Alcohol, such as beer and red wine. You may be told to avoid alcohol completely.    Soft drinks that contain sugar or high fructose corn syrup    Certain fish, including anchovies, sardines, fish eggs, and herring    Shellfish    Certain meats, such as red meat, hot dogs, luncheon meats, and turkey    Organ meats, such as liver, kidneys, and sweetbreads    Legumes, such as dried beans and peas    Other high fat foods such as gravy, whole milk, and high fat cheeses    Vegetables such as asparagus, cauliflower, spinach, and mushrooms used to be thought to contribute to an increased risk for a gout attack, but recent studies show that high purine vegetables don't increase the risk for a gout attack.  Eat more of these foods  Other foods may be helpful for people with gout. Add some of these foods to your diet:    Cherries contain chemicals that may lower uric acid.    Omega fatty acids. These are found in some fatty fish such as salmon, certain oils (flax, olive, or nut),  and nuts themselves. Omega fatty acids may help prevent inflammation due to gout.    Dairy products that are low-fat or fat-free, such as cheese and yogurt    Complex carbohydrate foods, including whole grains, brown rice, oats, and beans    Coffee, in moderation    Water, approximately 64 ounces per day  Follow-up care  Follow up with your healthcare provider as advised.  When to seek medical advice  Call your healthcare provider right away if any of these occur:    Return of gout symptoms, usually at night:    Severe pain, swelling, and heat in a joint, especially the base of the big toe    Affected joint is hard to move    Skin of the affected joint is purple or red    Fever of 100.4 F (38 C) or higher    Pain that doesn't get better even with prescribed medicine   Date Last Reviewed: 1/12/2016 2000-2017 [employer]. [address] [place] [address]. All rights reserved. This information is not intended as a substitute for professional medical care. Always follow your healthcare professional's instructions.                Follow-ups after your visit        Who to contact     If you have questions or need follow up information about today's clinic visit or your schedule please contact [facility] directly at [phone].  Normal or non-critical lab and imaging results will be communicated to you by MyChart, letter or phone within 4 business days after the clinic has received the results. If you do not hear from us within 7 days, please contact the clinic through [employer] or phone. If you have a critical or abnormal lab result, we will notify you by phone as soon as possible.  Submit refill requests through [employer] or call your pharmacy and they will forward the refill request to us. Please allow 3 business days for your refill to be completed.          Additional Information About Your Visit        [employer] Information     [employer] gives you secure access to your  electronic health record. If you see a primary care provider, you can also send messages to your care team and make appointments. If you have questions, please call your primary care clinic.  If you do not have a primary care provider, please call [phone] and they will assist you.        Care EveryWhere ID     This is your Care EveryWhere ID. This could be used by other organizations to access your [facility] medical records  [id number]        Your Vitals Were     Pulse Temperature Respirations BMI (Body Mass Index)          85 97.9  F (36.6  C) (Oral) 20 27.78 kg/m2         Blood Pressure from Last 3 Encounters:   05/09/18 108/76   03/27/18 110/60   11/29/17 118/72    Weight from Last 3 Encounters:   05/09/18 172 lb 1.6 oz (78.1 kg)   03/27/18 170 lb (77.1 kg)   11/29/17 171 lb (77.6 kg)              We Performed the Following     Uric acid          Today's Medication Changes          These changes are accurate as of 5/9/18 11:15 AM.  If you have any questions, ask your nurse or doctor.               Start taking these medicines.        Dose/Directions    indomethacin 50 MG capsule   Commonly known as:  INDOCIN   Used for:  Great toe pain, right   Started by:  Alethea Solano PA-C        Dose:  50 mg   Take 1 capsule (50 mg) by mouth 3 times daily (with meals)   Quantity:  30 capsule   Refills:  0            Where to get your medicines      These medications were sent to [id number] - [place], [place] - [address], [place] [address]     Phone:  [phone]     indomethacin 50 MG capsule                Primary Care Provider Office Phone # Fax #    Rissa Serrano [phone] [address] [place] [address]        Equal Access to Services     YEN FARLEY : González Tan, dong weldon, jayashree fuller. So [facility] [phone].    ATENCIÓN: Jun brewster  español, tiene a hastings disposición servicios gratuitos de asistencia lingüística. [place] [phone].    We comply with applicable federal civil rights laws and [place] laws. We do not discriminate on the basis of race, color, national origin, age, disability, sex, sexual orientation, or gender identity.            Thank you!     Thank you for choosing [facility]  for your care. Our goal is always to provide you with excellent care. Hearing back from our patients is one way we can continue to improve our services. Please take a few minutes to complete the written survey that you may receive in the mail after your visit with us. Thank you!             Your Updated Medication List - Protect others around you: Learn how to safely use, store and throw away your medicines at www.disposemymeds.org.          This list is accurate as of 5/9/18 11:15 AM.  Always use your most recent med list.                   Brand Name Dispense Instructions for use Diagnosis    acyclovir 400 MG tablet    ZOVIRAX    90 tablet    Take 1 tablet (400 mg) by mouth daily , 1 tab twice daily during outbreak    HSV-2 infection       CALCIUM 600 PO      Take 1 tablet by mouth daily        FISH OIL PO      Take 1 capsule by mouth daily        FLUoxetine 20 MG capsule    PROzac    90 capsule    Take 1 capsule (20 mg) by mouth daily    Anxiety, Moderate single current episode of major depressive disorder (H)       indomethacin 50 MG capsule    INDOCIN    30 capsule    Take 1 capsule (50 mg) by mouth 3 times daily (with meals)    Great toe pain, right       levonorgestrel 20 MCG/24HR IUD    MIRENA    1 each    1 each (20 mcg) by Intrauterine route once for 1 dose    Encounter for IUD insertion       MELATONIN PO           Multi-vitamin Tabs tablet      Take 1 tablet by mouth daily        ondansetron 4 MG ODT tab    ZOFRAN ODT    20 tablet    Take 1-2 tablets (4-8 mg) by mouth every 8 hours as needed for nausea    Nausea        PROBIOTIC COLON SUPPORT PO      Take 1 tablet by mouth daily        VITAMIN D PO      Take 1,000 Int'l Units by mouth daily

## 2018-06-10 DIAGNOSIS — R11.0 NAUSEA: ICD-10-CM

## 2018-06-10 NOTE — TELEPHONE ENCOUNTER
"Requested Prescriptions   Pending Prescriptions Disp Refills     ondansetron (ZOFRAN-ODT) 4 MG ODT tab [Pharmacy Med Name: ONDANSETRON ODT 4 MG TABLET]  Last Written Prescription Date:  7/18/17  Last Fill Quantity: 20 TABLET,  # refills: 1   Last office visit: 11/29/2017 with prescribing provider:  TAISHA   Future Office Visit:     20 tablet 1     Sig: TAKE 1-2 TABLETS BY MOUTH EVERY 8 HOURS AS NEEDED FOR NAUSEA     Antivertigo/Antiemetic Agents Passed    6/10/2018 12:56 PM       Passed - Recent (12 mo) or future (30 days) visit within the authorizing provider's specialty    Patient had office visit in the last 12 months or has a visit in the next 30 days with authorizing provider or within the authorizing provider's specialty.  See \"Patient Info\" tab in inbasket, or \"Choose Columns\" in Meds & Orders section of the refill encounter.           Passed - Patient is 18 years of age or older          "

## 2018-06-12 RX ORDER — ONDANSETRON 4 MG/1
TABLET, ORALLY DISINTEGRATING ORAL
Qty: 20 TABLET | Refills: 1 | Status: SHIPPED | OUTPATIENT
Start: 2018-06-12 | End: 2019-06-19

## 2018-09-16 DIAGNOSIS — F41.9 ANXIETY: ICD-10-CM

## 2018-09-16 DIAGNOSIS — F32.1 MODERATE SINGLE CURRENT EPISODE OF MAJOR DEPRESSIVE DISORDER (H): ICD-10-CM

## 2018-09-16 NOTE — TELEPHONE ENCOUNTER
"Requested Prescriptions   Pending Prescriptions Disp Refills     FLUoxetine (PROZAC) 20 MG capsule [Pharmacy Med Name: FLUOXETINE HCL 20 MG CAPSULE]  Last Written Prescription Date:  11/29/2017  Last Fill Quantity: 90 capsule,  # refills: 1   Last office visit: 11/29/2017 with prescribing provider:  Rissa Serrano MD   Future Office Visit:     90 capsule 1     Sig: TAKE 1 CAPSULE (20 MG) BY MOUTH DAILY    SSRIs Protocol Failed    9/16/2018  3:51 AM       Failed - PHQ-9 score less than 5 in past 6 months    Please review last PHQ-9 score.   PHQ-9 SCORE 4/6/2017 11/29/2017 3/27/2018   Total Score - - -   Total Score MyChart - - -   Total Score 3 2 6     JUDAH-7 SCORE 4/6/2017 11/29/2017 3/27/2018   Total Score - - -   Total Score 1 0 1              Failed - Recent (6 mo) or future (30 days) visit within the authorizing provider's specialty    Patient had office visit in the last 6 months or has a visit in the next 30 days with authorizing provider or within the authorizing provider's specialty.  See \"Patient Info\" tab in inbasket, or \"Choose Columns\" in Meds & Orders section of the refill encounter.           Passed - Patient is age 18 or older       Passed - No active pregnancy on record       Passed - No positive pregnancy test in last 12 months          "

## 2018-09-19 NOTE — TELEPHONE ENCOUNTER
Patient is due for appointment:  PHQ-9 score:    PHQ-9 SCORE 3/27/2018   Total Score -   Total Score MyChart -   Total Score 6     MC sent to patient with PHQ-9 and advising appointment.  Kaitlyn Baez RN  Message handled by Nurse Triage.

## 2018-09-21 ENCOUNTER — PRE VISIT (OUTPATIENT)
Dept: SLEEP MEDICINE | Facility: CLINIC | Age: 28
End: 2018-09-21

## 2018-09-21 NOTE — TELEPHONE ENCOUNTER
"  1.  Reason for the visit:  Follow up after 2015 sleep study to discuss treatment options  2.  Referring provider and clinic name:  [facility]  3.  Previous Sleep Doctor or Pulmonlogist (clinic name)?  Dr. Luis Eduardo [facility]  4.  Records, Procedures, Imaging, and Labs (see below)  In Epic        All NOTES from previous office visits that pertain to why they are being seen in the Sleep Center    Previous Sleep Studies, Chest CT, Echos and reports that pertain to why they are seeing Sleep Center    All Sleep records that have been done in the last 2 years that pertain to why they are seeing Sleep Center            Are they being seen for continuation of care for Cpap/Bipap/Avap/Trilogy/Dental Device? no    If yes to above Who and Where was Device issued/currently getting supplies from? no    Are you currently on \"Supplemental Oxygen\" during the day or night?   no                                                                                                                                                      Please remind pt to bring Cpap machine and ask to arrive 15 minutes early to appointment due traffic and congestion                                                 5. Pt Sleep Center Packet received Message left asking pt to arrive 30 mins early if no packet rev'd    Yes: \"please make sure that you bring this to your appointment completed, either the doctor will not see you until this completed or you may be asked to reschedule your appointment.\"     No: mail or email to the pt and explain, \"please make sure that you bring this to your appointment completed, either the doctor will not see you until this completed or you may be asked to reschedule your appointment.\"     ~If pt coming early to fill packet out, ask that they come 30 minutes prior to their appointment~     6. Has the pt's medication list been updated and preferred pharmacy added? no    7. Has the allergy list been reviewed?no    \"Thank " "you for choosing [facility] and we look forward to seeing you at your upcoming appointment\"     "

## 2018-09-24 ENCOUNTER — OFFICE VISIT (OUTPATIENT)
Dept: SLEEP MEDICINE | Facility: CLINIC | Age: 28
End: 2018-09-24
Payer: COMMERCIAL

## 2018-09-24 VITALS
WEIGHT: 173 LBS | OXYGEN SATURATION: 97 % | HEIGHT: 65 IN | SYSTOLIC BLOOD PRESSURE: 128 MMHG | BODY MASS INDEX: 28.82 KG/M2 | DIASTOLIC BLOOD PRESSURE: 87 MMHG | HEART RATE: 82 BPM | RESPIRATION RATE: 16 BRPM

## 2018-09-24 DIAGNOSIS — G47.61 PLMD (PERIODIC LIMB MOVEMENT DISORDER): ICD-10-CM

## 2018-09-24 DIAGNOSIS — G47.26 CIRCADIAN RHYTHM SLEEP DISORDER, SHIFT WORK TYPE: ICD-10-CM

## 2018-09-24 DIAGNOSIS — R06.83 SNORING: Primary | ICD-10-CM

## 2018-09-24 DIAGNOSIS — G47.50 PARASOMNIA, UNSPECIFIED: ICD-10-CM

## 2018-09-24 PROCEDURE — 99205 OFFICE O/P NEW HI 60 MIN: CPT | Performed by: NURSE PRACTITIONER

## 2018-09-24 NOTE — MR AVS SNAPSHOT
After Visit Summary   9/24/2018    Celia Castro    MRN: [id number]           Patient Information     Date Of Birth          1990        Visit Information        Provider Department      9/24/2018 10:00 AM Courtney Michaud APRN CNP [facility]        Today's Diagnoses     PLMD (periodic limb movement disorder)    -  1      Care Instructions    Ask family: snore, snort, do you kick, any abn movements/behaviors    Consider : door knob safety device  2 wks of sleep diaries  Follow up with me  We never know when a person may progress from acting out dreams while lying in bed, to leaving the bed or jumping from bed.  These recommendations should be considered...    Safety measures should be taken when patients are acting out their dreams.  We encourage bed partners to sleep separately for their own safety, till we have more data on cause of the behavior.    Window and door locks, along with clearing the bedroom of objects that could be used as a weapon (lamps, hand held mirrors) or firearms.  Placing the bed on the floor can be protective to prevent falls or minimize injury from diving from bed.    The following is recommended:fasting  Fasting ferritin and TIBC-- fasting  Other treatments that can work well is developing a routine of stretching legs, warm bath, massage of legs with lotion, all before bedtime.  Avoiding excessive caffeine, alcohol can help as well.    We may consider the following:  Ferrous sulfate 325mg and 500mg of vitamin C _____ daily. Most common side effects is constipation, which can be treated with increasing water and fiber or adding miralax if needed.   An iron infusion is also a possibility, but there are some side effects of this treatment with rare but possible anaphylaxis, so most of my patients elect ferrous sulfate and add gabapentin till the ferritin level rises.  Gabapentin 100mg pill, 1.5 hrs before bedtime. 1 pill for 3-5 days, if needed  increase to 2 pills, then after 3-5 days can increase to 3 pills if needed.  No driving after taking gabapentin. Let me know what pharmacy you would like this to go to.        Your BMI is Body mass index is 28.72 kg/(m^2).  Weight management is a personal decision.  If you are interested in exploring weight loss strategies, the following discussion covers the approaches that may be successful. Body mass index (BMI) is one way to tell whether you are at a healthy weight, overweight, or obese. It measures your weight in relation to your height.  A BMI of 18.5 to 24.9 is in the healthy range. A person with a BMI of 25 to 29.9 is considered overweight, and someone with a BMI of 30 or greater is considered obese. More than two-thirds of American adults are considered overweight or obese.  Being overweight or obese increases the risk for further weight gain. Excess weight may lead to heart disease and diabetes.  Creating and following plans for healthy eating and physical activity may help you improve your health.  Weight control is part of healthy lifestyle and includes exercise, emotional health, and healthy eating habits. Careful eating habits lifelong are the mainstay of weight control. Though there are significant health benefits from weight loss, long-term weight loss with diet alone may be very difficult to achieve- studies show long-term success with dietary management in less than 10% of people. Attaining a healthy weight may be especially difficult to achieve in those with severe obesity. In some cases, medications, devices and surgical management might be considered.  What can you do?  If you are overweight or obese and are interested in methods for weight loss, you should discuss this with your provider.     Consider reducing daily calorie intake by 500 calories.     Keep a food journal.     Avoiding skipping meals, consider cutting portions instead.    Diet combined with exercise helps maintain muscle while  optimizing fat loss. Strength training is particularly important for building and maintaining muscle mass. Exercise helps reduce stress, increase energy, and improves fitness. Increasing exercise without diet control, however, may not burn enough calories to loose weight.       Start walking three days a week 10-20 minutes at a time    Work towards walking thirty minutes five days a week     Eventually, increase the speed of your walking for 1-2 minutes at time    In addition, we recommend that you review healthy lifestyles and methods for weight loss available through the National Institutes of Health patient information sites:  http://win.niddk.nih.gov/publications/index.htm    And look into health and wellness programs that may be available through your health insurance provider, employer, local community center, or donell club.    Weight management plan: Patient was referred to their PCP to discuss a diet and exercise plan.              Follow-ups after your visit        Follow-up notes from your care team     Return for sleep diaries, ferritin level, follow up with me.      Your next 10 appointments already scheduled     Oct 09, 2018 10:00 AM CDT   Return Sleep Patient with ALEXANDER Castaneda CNP   [facility] ([facility])    [address] [place] [id number] [phone]            Oct 31, 2018 11:00 AM CDT   Aura Long with Rissa Serrano MD   [facility]    [address] [place] [id number] [phone]              Future tests that were ordered for you today     Open Future Orders        Priority Expected Expires Ordered    Ferritin Routine  11/23/2018 9/24/2018    Iron and Iron Binding Capacity Routine  11/23/2018 9/24/2018            Who to contact     If you have questions or need follow up information about today's clinic visit or your  "schedule please contact [facility] directly at [phone].  Normal or non-critical lab and imaging results will be communicated to you by MyChart, letter or phone within 4 business days after the clinic has received the results. If you do not hear from us within 7 days, please contact the clinic through MyChart or phone. If you have a critical or abnormal lab result, we will notify you by phone as soon as possible.  Submit refill requests through [employer] or call your pharmacy and they will forward the refill request to us. Please allow 3 business days for your refill to be completed.          Additional Information About Your Visit        Go[employer] Information     Next[employer] gives you secure access to your electronic health record. If you see a primary care provider, you can also send messages to your care team and make appointments. If you have questions, please call your primary care clinic.  If you do not have a primary care provider, please call [phone] and they will assist you.        Care EveryWhere ID     This is your Care EveryWhere ID. This could be used by other organizations to access your [place] medical records  [id number]        Your Vitals Were     Pulse Respirations Height Pulse Oximetry BMI (Body Mass Index)       82 16 1.653 m (5' 5.08\") 97% 28.72 kg/m2        Blood Pressure from Last 3 Encounters:   09/24/18 128/87   05/09/18 108/76   03/27/18 110/60    Weight from Last 3 Encounters:   09/24/18 78.5 kg (173 lb)   05/09/18 78.1 kg (172 lb 1.6 oz)   03/27/18 77.1 kg (170 lb)               Primary Care Provider Office Phone # Fax #    Rissa Serrano [phone] [address] [place] [address]        Equal Access to Services     ULYSSES FARLEY AH: González Tan, wakarissa weldon, qabrown kaangela padilla, jayashree del castillo. So [facility] [phone].    ATENCIÓN: Si habla español, tiene a hastings disposición " servicios gratuitos de asistencia lingüística. [place] [phone].    We comply with applicable federal civil rights laws and [place] laws. We do not discriminate on the basis of race, color, national origin, age, disability, sex, sexual orientation, or gender identity.            Thank you!     Thank you for choosing [facility]  for your care. Our goal is always to provide you with excellent care. Hearing back from our patients is one way we can continue to improve our services. Please take a few minutes to complete the written survey that you may receive in the mail after your visit with us. Thank you!             Your Updated Medication List - Protect others around you: Learn how to safely use, store and throw away your medicines at www.disposemymeds.org.          This list is accurate as of 9/24/18 11:15 AM.  Always use your most recent med list.                   Brand Name Dispense Instructions for use Diagnosis    acyclovir 400 MG tablet    ZOVIRAX    90 tablet    Take 1 tablet (400 mg) by mouth daily , 1 tab twice daily during outbreak    HSV-2 infection       CALCIUM 600 PO      Take 1 tablet by mouth daily        FISH OIL PO      Take 1 capsule by mouth daily        FLUoxetine 20 MG capsule    PROzac    30 capsule    TAKE 1 CAPSULE (20 MG) BY MOUTH DAILY    Anxiety, Moderate single current episode of major depressive disorder (H)       indomethacin 50 MG capsule    INDOCIN    30 capsule    Take 1 capsule (50 mg) by mouth 3 times daily (with meals)    Great toe pain, right       levonorgestrel 20 MCG/24HR IUD    MIRENA    1 each    1 each (20 mcg) by Intrauterine route once for 1 dose    Encounter for IUD insertion       Multi-vitamin Tabs tablet      Take 1 tablet by mouth daily        ondansetron 4 MG ODT tab    ZOFRAN-ODT    20 tablet    TAKE 1-2 TABLETS BY MOUTH EVERY 8 HOURS AS NEEDED FOR NAUSEA    Nausea       PROBIOTIC COLON SUPPORT PO      Take 1 tablet by mouth daily         UNABLE TO FIND      MEDICATION NAME: Z- patch by yazan for sleep        VITAMIN D PO      Take 1,000 Int'l Units by mouth daily

## 2018-09-24 NOTE — PATIENT INSTRUCTIONS
Ask family: snore, snort, do you kick, any abn movements/behaviors    Consider : door knob safety device  2 wks of sleep diaries  Follow up with me  We never know when a person may progress from acting out dreams while lying in bed, to leaving the bed or jumping from bed.  These recommendations should be considered...    Safety measures should be taken when patients are acting out their dreams.  We encourage bed partners to sleep separately for their own safety, till we have more data on cause of the behavior.    Window and door locks, along with clearing the bedroom of objects that could be used as a weapon (lamps, hand held mirrors) or firearms.  Placing the bed on the floor can be protective to prevent falls or minimize injury from diving from bed.    The following is recommended:fasting  Fasting ferritin and TIBC-- fasting  Other treatments that can work well is developing a routine of stretching legs, warm bath, massage of legs with lotion, all before bedtime.  Avoiding excessive caffeine, alcohol can help as well.    We may consider the following:  Ferrous sulfate 325mg and 500mg of vitamin C _____ daily. Most common side effects is constipation, which can be treated with increasing water and fiber or adding miralax if needed.   An iron infusion is also a possibility, but there are some side effects of this treatment with rare but possible anaphylaxis, so most of my patients elect ferrous sulfate and add gabapentin till the ferritin level rises.  Gabapentin 100mg pill, 1.5 hrs before bedtime. 1 pill for 3-5 days, if needed increase to 2 pills, then after 3-5 days can increase to 3 pills if needed.  No driving after taking gabapentin. Let me know what pharmacy you would like this to go to.        Your BMI is Body mass index is 28.72 kg/(m^2).  Weight management is a personal decision.  If you are interested in exploring weight loss strategies, the following discussion covers the approaches that may be  successful. Body mass index (BMI) is one way to tell whether you are at a healthy weight, overweight, or obese. It measures your weight in relation to your height.  A BMI of 18.5 to 24.9 is in the healthy range. A person with a BMI of 25 to 29.9 is considered overweight, and someone with a BMI of 30 or greater is considered obese. More than two-thirds of American adults are considered overweight or obese.  Being overweight or obese increases the risk for further weight gain. Excess weight may lead to heart disease and diabetes.  Creating and following plans for healthy eating and physical activity may help you improve your health.  Weight control is part of healthy lifestyle and includes exercise, emotional health, and healthy eating habits. Careful eating habits lifelong are the mainstay of weight control. Though there are significant health benefits from weight loss, long-term weight loss with diet alone may be very difficult to achieve- studies show long-term success with dietary management in less than 10% of people. Attaining a healthy weight may be especially difficult to achieve in those with severe obesity. In some cases, medications, devices and surgical management might be considered.  What can you do?  If you are overweight or obese and are interested in methods for weight loss, you should discuss this with your provider.     Consider reducing daily calorie intake by 500 calories.     Keep a food journal.     Avoiding skipping meals, consider cutting portions instead.    Diet combined with exercise helps maintain muscle while optimizing fat loss. Strength training is particularly important for building and maintaining muscle mass. Exercise helps reduce stress, increase energy, and improves fitness. Increasing exercise without diet control, however, may not burn enough calories to loose weight.       Start walking three days a week 10-20 minutes at a time    Work towards walking thirty minutes five days a  week     Eventually, increase the speed of your walking for 1-2 minutes at time    In addition, we recommend that you review healthy lifestyles and methods for weight loss available through the National Institutes of Health patient information sites:  http://win.niddk.nih.gov/publications/index.htm    And look into health and wellness programs that may be available through your health insurance provider, employer, local community center, or donell club.    Weight management plan: Patient was referred to their PCP to discuss a diet and exercise plan.

## 2018-09-24 NOTE — TELEPHONE ENCOUNTER
Pt is scheduled for 10/31/18. The pt recently filled out PHQ-9 assessment with a score of 5. Will send in 30 day refill.     Lorraine Almendarez RN -- [facility]

## 2018-09-24 NOTE — NURSING NOTE
"    Chief Complaint   Patient presents with     Consult     Re-establish care for sleep issues. Had a referral for a sleep psychologist but never went.        Initial /87  Pulse 82  Resp 16  Ht 1.653 m (5' 5.08\")  Wt 78.5 kg (173 lb)  SpO2 97%  BMI 28.72 kg/m2 Estimated body mass index is 28.72 kg/(m^2) as calculated from the following:    Height as of this encounter: 1.653 m (5' 5.08\").    Weight as of this encounter: 78.5 kg (173 lb).    Medication Reconciliation: complete    Neck circumference: 13.25 inches / 33.5 centimeters.    DME:     Yareli Bullard [facility]       "

## 2018-09-25 NOTE — PROGRESS NOTES
Visit Date:   09/24/2018      DATE OF VISIT:  09/24/2018.      LOCATION:  [facility].       CHIEF COMPLAINT:  Celia Castro self-refers for problems with difficulty falling asleep, waking up during the night and fatigue during the day.      HISTORY OF PRESENT ILLNESS:  Reports a 10-year history of sleep disorder.  She has had a previous sleep study done in 2015.  On 02/16/2015 was 161 pounds at the time.  At that time was struggling with anxiety and depression.  Her sleep efficiency was decreased at 79.3% due to prolonged wake after sleep onset.  Her arousal index was elevated at 27.1.  There is no evidence of clinically significant sleep disordered breathing with an AHI of 4.1, a supine AHI of 6.3 and an RDI of 4.1.  Snoring was light and sporadic.  Lowest O2 sat was 87.2% and the time spent below 89% was 0 minutes.  Her PLM index was 20.5, PLM arousal index was 4.5.  There were PLMs recorded during REM, but excessive muscle activity was not present.  N3 and REM sleep were both decreased.  We reviewed these results, and patient refuted the fact that it was recommended that she consider a sleep psychologist; however, she did not make any followup at the time.  At this point in time she is interested in reviewing differences in her presentation and further developing a plan of care.  To make things more complicated, her sleep schedule is as follows:  She currently works a day/night rotation as a NICU RN.  With her previous study, she was working day shifts straight.  She works on days from 7:00 a.m. to usually 7:00 p.m.  Nights she works from 11:00 p.m. to 7:00 a.m.  On days where she is working a day shift, she will enter bed between 9:00P and 10:00 p.m. with a rise time of 6:00 a.m.  On days where she works night shift, she will enter bed after having a snack at about 9:00 a.m. and sleep until 5:00 p.m.  On rare occasions, may sleep as late as 10:00 p.m.  On days that she is off, she will  "enter bed between 10:00 and 11:00 p.m. and sleep until 8:00 or 11:00 a.m., having naped for a few hours in the morning after end of a night shift.  Takes 30 minutes to fall asleep.  She wakes up 2-4 times, and it takes 5 minutes to re-fall back asleep.  She will check the clock and basically saying, \"Do I have to get up now?\"  She may hit the snooze and avoid getting up until the last minute.  She will nap on days that she has off for about 3 hours.  It may be after a day shift, and she does not find this to be very restorative.  She can read a book from bed or use a phone for checking email.      She denies a feeling of restlessness as she enters her bed at night; however, she has woken herself up by kicking and may wake up with bruises usually on the inside of her legs.  The sheets are quite in disarray.  She has no evidence of having left her room.  She states that she usually remembers when she gets up and goes to the bathroom during the night.  She does report, however, that she has a mother who has significant issues with sleepwalking.  She has recently traveled with her family, and she is unaware of if she was snoring, if it was very loud, since she lives alone and will get more information.  She does have a dry throat at times in the morning, rare, but can have heartburn at night.  Sleeps primarily on her back and sides.  No signs of orexin deficiency.      SOCIAL, PERSONAL, FAMILY HISTORY AND SLEEP SCALES:  She is single.  Works as an RN.  Sleep environment:  Cats on occasion can wake her up.  Family members:  As said previously, mother has sleep walking and people snore.  She does have alcohol at times later in the evening.  Can have a drink somewhere between 6:00 or 7:00 p.m. if she is off for that night or often on a weekend may have 1-2 drinks between 9:00 and 10:00 p.m. or can have a drink or two more if she stays out until 2:00 in the morning.  No use of other recreational drugs.  Uses a sleep patch " which has melatonin and valerian on and has been doing that for some time now, and it is called ZPatch.  Does drink caffeine, especially on night shift.  Can have it late.  The last one can be as late as 3:00 or 4:00 in the morning and does exercise.  Her G[place] Sleepiness Scale today is 5/24.  She denies sleepiness as affecting her job or driving, but 25/30 days she is tired and fatigued, 4/30 days her mental health is not good.      REVIEW OF SYSTEMS:  A 14-point comprehensive review of systems completed and negative with the exception of the following:   MENTAL HEALTH:  Depression, anxiety and other mental health issues.      PAST SURGICAL HISTORY:  Tonsillectomy, laser eye surgery.      PAST MEDICAL HISTORY:  Depression, anxiety, insomnia, gout and herpes.      MEDICATIONS:  Fluoxetine.  Before that, she was on Effexor and did have excessive movements at night, and before that she tried Wellbutrin but was uncertain of its effects on her sleep.        Her insomnia scale is 14/28, which is mild.   Allergies:    No Known Allergies    Medications:    Current Outpatient Prescriptions   Medication Sig Dispense Refill     acyclovir (ZOVIRAX) 400 MG tablet Take 1 tablet (400 mg) by mouth daily , 1 tab twice daily during outbreak 90 tablet 3     Calcium Carbonate (CALCIUM 600 PO) Take 1 tablet by mouth daily       Cholecalciferol (VITAMIN D PO) Take 1,000 Int'l Units by mouth daily       FLUoxetine (PROZAC) 20 MG capsule TAKE 1 CAPSULE (20 MG) BY MOUTH DAILY 30 capsule 0     multivitamin, therapeutic with minerals (MULTI-VITAMIN) TABS tablet Take 1 tablet by mouth daily       Omega-3 Fatty Acids (FISH OIL PO) Take 1 capsule by mouth daily       ondansetron (ZOFRAN-ODT) 4 MG ODT tab TAKE 1-2 TABLETS BY MOUTH EVERY 8 HOURS AS NEEDED FOR NAUSEA 20 tablet 1     Probiotic Product (PROBIOTIC COLON SUPPORT PO) Take 1 tablet by mouth daily       UNABLE TO FIND MEDICATION NAME: Z- patch by yazan for sleep       indomethacin  "(INDOCIN) 50 MG capsule Take 1 capsule (50 mg) by mouth 3 times daily (with meals) (Patient not taking: Reported on 9/24/2018) 30 capsule 0     levonorgestrel (MIRENA) 20 MCG/24HR IUD 1 each (20 mcg) by Intrauterine route once for 1 dose 1 each 0       Problem List:  Patient Active Problem List    Diagnosis Date Noted     Moderate single current episode of major depressive disorder (H) 07/11/2017     Priority: Medium     Encounter for IUD insertion 12/28/2015     Priority: Medium     mirena placed December 28, 2015  Remove 12/2020       Anxiety 12/15/2010     Priority: Medium     4/11/11 JUDAH score 9       CARDIOVASCULAR SCREENING; LDL GOAL LESS THAN 160 10/31/2010     Priority: Medium        Past Medical/Surgical History:  Past Medical History:   Diagnosis Date     Major depressive disorder, single episode, moderate (H) 9/07     Past Surgical History:   Procedure Laterality Date     TONSILLECTOMY  2008     Physical Examination:  Vitals: /87  Pulse 82  Resp 16  Ht 1.653 m (5' 5.08\")  Wt 78.5 kg (173 lb)  SpO2 97%  BMI 28.72 kg/m2  BMI= Body mass index is 28.72 kg/(m^2).    Neck Cir (cm): 34 cm    [place] Total Score 9/24/2018   Total score - [place] 5       GENERAL APPEARANCE: healthy, alert and no distress  Musculoskeletal: one bruise on inside of R forearm.    ASSESSMENT AND PLAN:  It is my impression that Ms. 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  Her previous sleep study did not show any significant obstructive sleep apnea, and at this point in time, her weight is up by about 12 pounds, and the following plan of care has been developed:   1.  Probable snoring.  She will track with her family member as to volume frequency and other sleep disorder breathing symptoms she may have on her last trip with her family.  If it is significant, she may benefit from a repeat study.  This would likely need to be an in-lab study due to the irregularity of her " sleep schedule.   2.  PLMD, (RLS unlikely) with possible parasomnia. Will check ferritin and TIBC with possible events starting after sleep initiation (unlikely)  She denies an urge to move her extremities; however, she is aware that this is occurring throughout much of the night.  The drive for this may be her antidepressants of which she has been on for quite some time.  She does have evidence of sleep-related injury, but no direct evidence of her having left the bed, and no understanding how she could get the bruises she does which can be on her inner aspect of her lower extremities.  I have recommended she get a hand guard to help her stay in her bedroom and also to keep a sleep diary documenting the regularity of her sleep schedule when she does have evidence of bruising and relationship to caffeine, alcohol and of course periods of insufficient sleep that do occur with her shift work.   3.  Shift work.  Pending the results of the diary, may benefit from a change in shift.   4.  Parasomnia: likely confusional arousal, with no recall, on serotonin specific reuptake inhibitor.  See # 2 above.    Thank you for allowing me to participate in this kind woman's care.      Time spent with patient:  Sixty minutes, of which greater than 50% was spent in counseling, education and coordination of care.         ALEXANDER STANLEY, CNP             D: 2018   T: 2018   MT: EDWARD      Name:     CHAUNCEY ENCINAS   MRN:      [id number]        Account:      [id number]   :      1990           Visit Date:   2018      Document: [id number]

## 2018-10-05 DIAGNOSIS — G47.61 PLMD (PERIODIC LIMB MOVEMENT DISORDER): ICD-10-CM

## 2018-10-05 LAB
FERRITIN SERPL-MCNC: 51 NG/ML (ref 12–150)
IRON SATN MFR SERPL: 29 % (ref 15–46)
IRON SERPL-MCNC: 94 UG/DL (ref 35–180)
TIBC SERPL-MCNC: 328 UG/DL (ref 240–430)

## 2018-10-05 PROCEDURE — 36415 COLL VENOUS BLD VENIPUNCTURE: CPT | Performed by: NURSE PRACTITIONER

## 2018-10-05 PROCEDURE — 82728 ASSAY OF FERRITIN: CPT | Performed by: NURSE PRACTITIONER

## 2018-10-05 PROCEDURE — 83550 IRON BINDING TEST: CPT | Performed by: NURSE PRACTITIONER

## 2018-10-05 PROCEDURE — 83540 ASSAY OF IRON: CPT | Performed by: NURSE PRACTITIONER

## 2018-10-09 ENCOUNTER — OFFICE VISIT (OUTPATIENT)
Dept: SLEEP MEDICINE | Facility: CLINIC | Age: 28
End: 2018-10-09
Payer: COMMERCIAL

## 2018-10-09 ENCOUNTER — PRE VISIT (OUTPATIENT)
Dept: SLEEP MEDICINE | Facility: CLINIC | Age: 28
End: 2018-10-09

## 2018-10-09 VITALS
HEART RATE: 76 BPM | BODY MASS INDEX: 29.49 KG/M2 | HEIGHT: 65 IN | OXYGEN SATURATION: 97 % | DIASTOLIC BLOOD PRESSURE: 75 MMHG | SYSTOLIC BLOOD PRESSURE: 114 MMHG | RESPIRATION RATE: 18 BRPM | WEIGHT: 177 LBS

## 2018-10-09 DIAGNOSIS — G47.61 PLMD (PERIODIC LIMB MOVEMENT DISORDER): ICD-10-CM

## 2018-10-09 DIAGNOSIS — G47.23 CIRCADIAN RHYTHM SLEEP DISORDER, IRREGULAR SLEEP WAKE TYPE: Primary | ICD-10-CM

## 2018-10-09 DIAGNOSIS — G47.50 PARASOMNIA: ICD-10-CM

## 2018-10-09 PROCEDURE — 99214 OFFICE O/P EST MOD 30 MIN: CPT | Performed by: NURSE PRACTITIONER

## 2018-10-09 NOTE — TELEPHONE ENCOUNTER
Reports a 10-year history of sleep disorder.  She has had a previous sleep study done in 2015.  On 02/16/2015 was 161 pounds at the time.  At that time was struggling with anxiety and depression.  Her sleep efficiency was decreased at 79.3% due to prolonged wake after sleep onset.  Her arousal index was elevated at 27.1.  There is no evidence of clinically significant sleep disordered breathing with an AHI of 4.1, a supine AHI of 6.3 and an RDI of 4.1.  Snoring was light and sporadic.  Lowest O2 sat was 87.2% and the time spent below 89% was 0 minutes.  Her PLM index was 20.5, PLM arousal index was 4.5.  There were PLMs recorded during REM, but excessive muscle activity was not present.  N3 and REM sleep were both decreased.  We reviewed these results, and patient refuted the fact that it was recommended that she consider a sleep psychologist; however, she did not make any followup at the time     It is my impression that Ms. 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  Her previous sleep study did not show any significant obstructive sleep apnea, and at this point in time, her weight is up by about 12 pounds, and the following plan of care has been developed:   1.  Probable snoring.  She will track with her family member as to volume frequency and other sleep disorder breathing symptoms she may have on her last trip with her family.  If it is significant, she may benefit from a repeat study.  This would likely need to be an in-lab study due to the irregularity of her sleep schedule.   2.  PLMD, (RLS unlikely) with possible parasomnia. Will check ferritin and TIBC with possible events starting after sleep initiation (unlikely)  She denies an urge to move her extremities; however, she is aware that this is occurring throughout much of the night.  The drive for this may be her antidepressants of which she has been on for quite some time.  She does have evidence of  sleep-related injury, but no direct evidence of her having left the bed, and no understanding how she could get the bruises she does which can be on her inner aspect of her lower extremities.  I have recommended she get a hand guard to help her stay in her bedroom and also to keep a sleep diary documenting the regularity of her sleep schedule when she does have evidence of bruising and relationship to caffeine, alcohol and of course periods of insufficient sleep that do occur with her shift work.   3.  Shift work.  Pending the results of the diary, may benefit from a change in shift.   4.  Parasomnia: likely confusional arousal, with no recall, on serotonin specific reuptake inhibitor.  See # 2 above.

## 2018-10-09 NOTE — PATIENT INSTRUCTIONS
Consider talk with supervisor regarding a more healthy sleep schedule    Pick a wakeup time to strive for to regulate sleep schedule, if napping: < 45 mins on timer    Flip over from days to nights as discussed.    Diaries if you need further recommendations    Move prozac to am     Safety measures:    7-9 hrs    Avoid alcohol within 3-4 hrs  Your BMI is Body mass index is 29.45 kg/(m^2).  Weight management is a personal decision.  If you are interested in exploring weight loss strategies, the following discussion covers the approaches that may be successful. Body mass index (BMI) is one way to tell whether you are at a healthy weight, overweight, or obese. It measures your weight in relation to your height.  A BMI of 18.5 to 24.9 is in the healthy range. A person with a BMI of 25 to 29.9 is considered overweight, and someone with a BMI of 30 or greater is considered obese. More than two-thirds of [place] adults are considered overweight or obese.  Being overweight or obese increases the risk for further weight gain. Excess weight may lead to heart disease and diabetes.  Creating and following plans for healthy eating and physical activity may help you improve your health.  Weight control is part of healthy lifestyle and includes exercise, emotional health, and healthy eating habits. Careful eating habits lifelong are the mainstay of weight control. Though there are significant health benefits from weight loss, long-term weight loss with diet alone may be very difficult to achieve- studies show long-term success with dietary management in less than 10% of people. Attaining a healthy weight may be especially difficult to achieve in those with severe obesity. In some cases, medications, devices and surgical management might be considered.  What can you do?  If you are overweight or obese and are interested in methods for weight loss, you should discuss this with your provider.     Consider reducing daily calorie  intake by 500 calories.     Keep a food journal.     Avoiding skipping meals, consider cutting portions instead.    Diet combined with exercise helps maintain muscle while optimizing fat loss. Strength training is particularly important for building and maintaining muscle mass. Exercise helps reduce stress, increase energy, and improves fitness. Increasing exercise without diet control, however, may not burn enough calories to loose weight.       Start walking three days a week 10-20 minutes at a time    Work towards walking thirty minutes five days a week     Eventually, increase the speed of your walking for 1-2 minutes at time    In addition, we recommend that you review healthy lifestyles and methods for weight loss available through the National Institutes of Health patient information sites:  http://win.niddk.nih.gov/publications/index.htm    And look into health and wellness programs that may be available through your health insurance provider, employer, local community center, or donell club.    Weight management plan: Patient was referred to their PCP to discuss a diet and exercise plan.

## 2018-10-09 NOTE — MR AVS SNAPSHOT
After Visit Summary   10/9/2018    Celia Castro    MRN: [id number]           Patient Information     Date Of Birth          1990        Visit Information        Provider Department      10/9/2018 10:00 AM Courtney Michaud APRN CNP [facility]        Care Instructions    Consider talk with supervisor regarding a more healthy sleep schedule    Pick a wakeup time to strive for to regulate sleep schedule, if napping: < 45 mins on timer    Flip over from days to nights as discussed.    Diaries if you need further recommendations    Move prozac to am     Safety measures:    7-9 hrs    Avoid alcohol within 3-4 hrs  Your BMI is Body mass index is 29.45 kg/(m^2).  Weight management is a personal decision.  If you are interested in exploring weight loss strategies, the following discussion covers the approaches that may be successful. Body mass index (BMI) is one way to tell whether you are at a healthy weight, overweight, or obese. It measures your weight in relation to your height.  A BMI of 18.5 to 24.9 is in the healthy range. A person with a BMI of 25 to 29.9 is considered overweight, and someone with a BMI of 30 or greater is considered obese. More than two-thirds of American adults are considered overweight or obese.  Being overweight or obese increases the risk for further weight gain. Excess weight may lead to heart disease and diabetes.  Creating and following plans for healthy eating and physical activity may help you improve your health.  Weight control is part of healthy lifestyle and includes exercise, emotional health, and healthy eating habits. Careful eating habits lifelong are the mainstay of weight control. Though there are significant health benefits from weight loss, long-term weight loss with diet alone may be very difficult to achieve- studies show long-term success with dietary management in less than 10% of people. Attaining a healthy weight may be  especially difficult to achieve in those with severe obesity. In some cases, medications, devices and surgical management might be considered.  What can you do?  If you are overweight or obese and are interested in methods for weight loss, you should discuss this with your provider.     Consider reducing daily calorie intake by 500 calories.     Keep a food journal.     Avoiding skipping meals, consider cutting portions instead.    Diet combined with exercise helps maintain muscle while optimizing fat loss. Strength training is particularly important for building and maintaining muscle mass. Exercise helps reduce stress, increase energy, and improves fitness. Increasing exercise without diet control, however, may not burn enough calories to loose weight.       Start walking three days a week 10-20 minutes at a time    Work towards walking thirty minutes five days a week     Eventually, increase the speed of your walking for 1-2 minutes at time    In addition, we recommend that you review healthy lifestyles and methods for weight loss available through the National Institutes of Health patient information sites:  http://win.niddk.nih.gov/publications/index.htm    And look into health and wellness programs that may be available through your health insurance provider, employer, local community center, or donell club.    Weight management plan: Patient was referred to their PCP to discuss a diet and exercise plan.              Follow-ups after your visit        Follow-up notes from your care team     Return in about 2 weeks (around 10/23/2018).      Your next 10 appointments already scheduled     Oct 31, 2018 11:00 AM CDT   [facility]ura Miller [facility]    [address] [place] [id number]   [phone]            Nov 19, 2018  9:00 AM CST   Return Sleep Patient with ALEXANDER Castaneda [facility]  "[place] [facility]    [address] [place] [id number] [phone]              Who to contact     If you have questions or need follow up information about today's clinic visit or your schedule please contact [facility] directly at [phone].  Normal or non-critical lab and imaging results will be communicated to you by MyChart, letter or phone within 4 business days after the clinic has received the results. If you do not hear from us within 7 days, please contact the clinic through [employer] or phone. If you have a critical or abnormal lab result, we will notify you by phone as soon as possible.  Submit refill requests through [employer] or call your pharmacy and they will forward the refill request to us. Please allow 3 business days for your refill to be completed.          Additional Information About Your Visit        MyChart Information     [employer] gives you secure access to your electronic health record. If you see a primary care provider, you can also send messages to your care team and make appointments. If you have questions, please call your primary care clinic.  If you do not have a primary care provider, please call [phone] and they will assist you.        Care EveryWhere ID     This is your Care EveryWhere ID. This could be used by other organizations to access your [facility] medical records  [id number]        Your Vitals Were     Pulse Respirations Height Pulse Oximetry BMI (Body Mass Index)       76 18 1.651 m (5' 5\") 97% 29.45 kg/m2        Blood Pressure from Last 3 Encounters:   10/09/18 114/75   09/24/18 128/87   05/09/18 108/76    Weight from Last 3 Encounters:   10/09/18 80.3 kg (177 lb)   09/24/18 78.5 kg (173 lb)   05/09/18 78.1 kg (172 lb 1.6 oz)              Today, you had the following     No orders found for display       Primary Care Provider Office Phone # Fax #    Rissa Serrano MD " [phone] [address] [place] [address]        Equal Access to Services     YEN FARLEY : Hadii aad ku hadfatimahjam Primitivoali, waedsonda amandachristinaha, qaybta kaalmada jadensrikanthprecious, jayashree gillespiein hayaalopez bestsilvestre maryventuragail del castillo. So [facility] [phone].    ATENCIÓN: Si habla español, tiene a hastings disposición servicios gratuitos de asistencia lingüística. Llame al [phone].    We comply with applicable federal civil rights laws and [place] laws. We do not discriminate on the basis of race, color, national origin, age, disability, sex, sexual orientation, or gender identity.            Thank you!     Thank you for choosing [facility]  for your care. Our goal is always to provide you with excellent care. Hearing back from our patients is one way we can continue to improve our services. Please take a few minutes to complete the written survey that you may receive in the mail after your visit with us. Thank you!             Your Updated Medication List - Protect others around you: Learn how to safely use, store and throw away your medicines at www.disposemymeds.org.          This list is accurate as of 10/9/18 10:57 AM.  Always use your most recent med list.                   Brand Name Dispense Instructions for use Diagnosis    acyclovir 400 MG tablet    ZOVIRAX    90 tablet    Take 1 tablet (400 mg) by mouth daily , 1 tab twice daily during outbreak    HSV-2 infection       CALCIUM 600 PO      Take 1 tablet by mouth daily        FISH OIL PO      Take 1 capsule by mouth daily        FLUoxetine 20 MG capsule    PROzac    30 capsule    TAKE 1 CAPSULE (20 MG) BY MOUTH DAILY    Anxiety, Moderate single current episode of major depressive disorder (H)       levonorgestrel 20 MCG/24HR IUD    MIRENA    1 each    1 each (20 mcg) by Intrauterine route once for 1 dose    Encounter for IUD insertion       Multi-vitamin Tabs tablet      Take 1 tablet by mouth daily        ondansetron 4 MG  ODT tab    ZOFRAN-ODT    20 tablet    TAKE 1-2 TABLETS BY MOUTH EVERY 8 HOURS AS NEEDED FOR NAUSEA    Nausea       PROBIOTIC COLON SUPPORT PO      Take 1 tablet by mouth daily        UNABLE TO FIND      MEDICATION NAME: Z- patch by yazan for sleep        VITAMIN D PO      Take 1,000 Int'l Units by mouth daily

## 2018-10-09 NOTE — PROGRESS NOTES
Chief complaint: Patient returns with sleep diaries to identify sleep patterns and further develop a plan of care      HPI:Reports a 10-year history of sleep disorder.  She has had a previous sleep study done in 2015.  On 02/16/2015 was 161 pounds at the time.  At that time was struggling with anxiety and depression.  Her sleep efficiency was decreased at 79.3% due to prolonged wake after sleep onset.  Her arousal index was elevated at 27.1.  There is no evidence of clinically significant sleep disordered breathing with an AHI of 4.1, a supine AHI of 6.3 and an RDI of 4.1.  Snoring was light and sporadic.  Lowest O2 sat was 87.2% and the time spent below 89% was 0 minutes.  Her PLM index was 20.5, PLM arousal index was 4.5.  There were PLMs recorded during REM, but excessive muscle activity was not present.  N3 and REM sleep were both decreased.       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  Her previous sleep study did not show any significant obstructive sleep apnea, and at this point in time, her weight is up by about 12 pounds, and she did query her sister regarding her snoring behavior while may travel together.  It does sound like snoring was light and infrequent, occasional gasping.  With regard to restless legs her ferritin level was 51 ng/mL.  She does not have any classic sensations to move her legs as she does unwind prior to bedtime nor does she feel these sensations with her wake ups which are short but relatively frequent most nights.  This is likely not a big contributor to her problem.  She likely has PLMD with a possible.  She is on antidepressants and has been for quite some time, does have bruises that she does obtain on the inner aspect of her lower extremities of which she has no recollection or major concern as she has not been injury.  I have recommended safety measures that we would utilize in someone who has a  parasomnia or RVD and she has not done those at this point in time.  The likely  for her parasomnias is insufficient sleep resulting from her shiftwork.  She also extends her sleep quite significantly and she goes to an off day and may sleep in quite late if she does go out with friends.  The variance and rise time is for this past week while she was on days (but did travel to [place]) did very by 6 AM on a work day 2 as late as noon days off.  She does use a over-the-counter medication called in CPAP which has melatonin and valerian root along with other herbal products following a night shift to help initiating sleep.  She does find this to be quite successful.    Allergies:    No Known Allergies    Medications:    Current Outpatient Prescriptions   Medication Sig Dispense Refill     acyclovir (ZOVIRAX) 400 MG tablet Take 1 tablet (400 mg) by mouth daily , 1 tab twice daily during outbreak 90 tablet 3     FLUoxetine (PROZAC) 20 MG capsule TAKE 1 CAPSULE (20 MG) BY MOUTH DAILY 30 capsule 0     levonorgestrel (MIRENA) 20 MCG/24HR IUD 1 each (20 mcg) by Intrauterine route once for 1 dose 1 each 0     ondansetron (ZOFRAN-ODT) 4 MG ODT tab TAKE 1-2 TABLETS BY MOUTH EVERY 8 HOURS AS NEEDED FOR NAUSEA 20 tablet 1     UNABLE TO FIND MEDICATION NAME: Z- patch by yazan for sleep       Calcium Carbonate (CALCIUM 600 PO) Take 1 tablet by mouth daily       Cholecalciferol (VITAMIN D PO) Take 1,000 Int'l Units by mouth daily       multivitamin, therapeutic with minerals (MULTI-VITAMIN) TABS tablet Take 1 tablet by mouth daily       Omega-3 Fatty Acids (FISH OIL PO) Take 1 capsule by mouth daily       Probiotic Product (PROBIOTIC COLON SUPPORT PO) Take 1 tablet by mouth daily         Problem List:  Patient Active Problem List    Diagnosis Date Noted     Moderate single current episode of major depressive disorder (H) 07/11/2017     Priority: Medium     Encounter for IUD insertion 12/28/2015     Priority: Medium      "mirena placed December 28, 2015  Remove 12/2020       Anxiety 12/15/2010     Priority: Medium     4/11/11 JUDAH score 9       CARDIOVASCULAR SCREENING; LDL GOAL LESS THAN 160 10/31/2010     Priority: Medium        Past Medical/Surgical History:  Past Medical History:   Diagnosis Date     Major depressive disorder, single episode, moderate (H) 9/07     Past Surgical History:   Procedure Laterality Date     TONSILLECTOMY  2008           Physical Examination:  Vitals: /75  Pulse 76  Resp 18  Ht 1.651 m (5' 5\")  Wt 80.3 kg (177 lb)  SpO2 97%  BMI 29.45 kg/m2  BMI= Body mass index is 29.45 kg/(m^2).         [place] Total Score 10/9/2018   Total score - [place] 3       GENERAL APPEARANCE: healthy, alert and no distress    Assessment and plan: With an irregular sleep-wake pattern as she does have when she is scheduled having an irregular rise time due to napping, staying out late the night before, does make it difficult to set some parameters to normalize her schedule and also reduce the frequency of parasomnia events.  The following plan of care has been developed:  1.  Irregular sleep-wake schedule: It does seem that she may benefit from a single schedule.  She does state in her department if she would become a straight shift worker it would need to be a 3-11 shift.  She would like to discuss this with her supervisor to see if there could be either a trial of moving her to a perhaps more healthy regular shift and seeing if she should feel better.  Despite that there was a significant amount of variance in her rise time and total sleep time throughout the month of late September to early October, she did not have one episode of parasomnias.  I will see her back in 2 weeks time with a recommendation of striving for some regularity in her sleep schedule by reducing nap length to less than 45 minutes on time.  It would be guzman for her also to try and minimize the difference between rise time when she is working " spring a days and then has off a number of days in a row.  It does appear that if she is off the next day she will go to bed quite late and sleep and this further disrupts her sleep schedule.  We will then discuss the content of the conversation with her supervisor.  I have no information on her sleep diaries that show how she flips over from dayshift sleeping patterns to her night shift sleeping patterns.    2.  PLMD: She moved from Prozac to the morning since it seems that she has more day shifts then then nights and will see how this works for her.  She was taking her Prozac at night before she went to bed.  3.  Parasomnia no activity this months despite quite irregular sleep schedule.  She is encouraged to perform some minimal safety devices to at least keep her in her room.    Time spent with patient 25 minutes of which greater than 50% was spent in counseling and education coordination of care.

## 2018-10-31 ENCOUNTER — OFFICE VISIT (OUTPATIENT)
Dept: PEDIATRICS | Facility: CLINIC | Age: 28
End: 2018-10-31
Payer: COMMERCIAL

## 2018-10-31 VITALS
HEIGHT: 65 IN | BODY MASS INDEX: 29.32 KG/M2 | SYSTOLIC BLOOD PRESSURE: 118 MMHG | WEIGHT: 176 LBS | DIASTOLIC BLOOD PRESSURE: 70 MMHG | OXYGEN SATURATION: 99 % | TEMPERATURE: 98.1 F | HEART RATE: 85 BPM

## 2018-10-31 DIAGNOSIS — F32.1 MODERATE SINGLE CURRENT EPISODE OF MAJOR DEPRESSIVE DISORDER (H): Primary | ICD-10-CM

## 2018-10-31 DIAGNOSIS — F41.9 ANXIETY: ICD-10-CM

## 2018-10-31 PROCEDURE — 99214 OFFICE O/P EST MOD 30 MIN: CPT | Performed by: PEDIATRICS

## 2018-10-31 RX ORDER — FLUOXETINE 40 MG/1
40 CAPSULE ORAL DAILY
Qty: 30 CAPSULE | Refills: 1 | Status: SHIPPED | OUTPATIENT
Start: 2018-10-31 | End: 2019-08-05

## 2018-10-31 ASSESSMENT — ANXIETY QUESTIONNAIRES
3. WORRYING TOO MUCH ABOUT DIFFERENT THINGS: NOT AT ALL
GAD7 TOTAL SCORE: 4
7. FEELING AFRAID AS IF SOMETHING AWFUL MIGHT HAPPEN: NOT AT ALL
1. FEELING NERVOUS, ANXIOUS, OR ON EDGE: SEVERAL DAYS
5. BEING SO RESTLESS THAT IT IS HARD TO SIT STILL: SEVERAL DAYS
IF YOU CHECKED OFF ANY PROBLEMS ON THIS QUESTIONNAIRE, HOW DIFFICULT HAVE THESE PROBLEMS MADE IT FOR YOU TO DO YOUR WORK, TAKE CARE OF THINGS AT HOME, OR GET ALONG WITH OTHER PEOPLE: NOT DIFFICULT AT ALL
2. NOT BEING ABLE TO STOP OR CONTROL WORRYING: NOT AT ALL
6. BECOMING EASILY ANNOYED OR IRRITABLE: SEVERAL DAYS

## 2018-10-31 ASSESSMENT — PATIENT HEALTH QUESTIONNAIRE - PHQ9
SUM OF ALL RESPONSES TO PHQ QUESTIONS 1-9: 8
5. POOR APPETITE OR OVEREATING: SEVERAL DAYS

## 2018-10-31 NOTE — PROGRESS NOTES
"  SUBJECTIVE:   Celia Castro is a 27 year old female who presents to clinic today for the following health issues:      Depression and Anxiety Follow-Up    Status since last visit: Worsened over the past 2 months, not sure if related to weather/light.  She is currently working with sleep medicine to help regulate her schedule.  She is still working in the NICU - alternating day/nights - talked with her manager that said she could work all nights.    Other associated symptoms:None    Complicating factors:     Significant life event: No     Current substance abuse: None    PHQ 11/29/2017 3/27/2018 9/20/2018   PHQ-9 Total Score 2 6 5   Q9: Suicide Ideation Not at all Not at all Not at all     JUDAH-7 SCORE 4/6/2017 11/29/2017 3/27/2018   Total Score - - -   Total Score 1 0 1       PHQ-9  English  PHQ-9   Any Language  JUDAH-7  Suicide Assessment Five-step Evaluation and Treatment (SAFE-T)    Amount of exercise or physical activity: 2-3 days/week - doing yoga and arial    Problems taking medications regularly: No    Medication side effects: none    Diet: regular (no restrictions)      Problem list and histories reviewed & adjusted, as indicated.  Additional history: as documented    Reviewed and updated as needed this visit by clinical staff       Reviewed and updated as needed this visit by Provider         ROS:  Constitutional, HEENT, cardiovascular, pulmonary, gi and gu systems are negative, except as otherwise noted.    OBJECTIVE:     /70 (BP Location: Right arm, Cuff Size: Adult Large)  Pulse 85  Temp 98.1  F (36.7  C) (Oral)  Ht 5' 5\" (1.651 m)  Wt 176 lb (79.8 kg)  SpO2 99%  BMI 29.29 kg/m2  Body mass index is 29.29 kg/(m^2).  GENERAL APPEARANCE: healthy, alert and no distress  RESP: lungs clear to auscultation - no rales, rhonchi or wheezes  CV: regular rates and rhythm, normal S1 S2, no S3 or S4 and no murmur, click or rub    Diagnostic Test Results:  none     ASSESSMENT/PLAN:       1. Moderate single " current episode of major depressive disorder (H)  Uncontrolled, increase prozac to 40mg and follow up in 2 months.  Encourage yoga and other exercise.  Sleep regulation with sleep medicine.  - FLUoxetine (PROZAC) 40 MG capsule; Take 1 capsule (40 mg) by mouth daily  Dispense: 30 capsule; Refill: 1    2. Anxiety  See #1  - FLUoxetine (PROZAC) 40 MG capsule; Take 1 capsule (40 mg) by mouth daily  Dispense: 30 capsule; Refill: 1    Patient Instructions   Increase prozac to 40mg and plan to follow up in 2 months.      Rissa Serrano MD  [facility]

## 2018-10-31 NOTE — MR AVS SNAPSHOT
After Visit Summary   10/31/2018    Celia Castro    MRN: [id number]           Patient Information     Date Of Birth          1990        Visit Information        Provider Department      10/31/2018 11:00 AM Rissa Serrano MD [facility]        Today's Diagnoses     Moderate single current episode of major depressive disorder (H)    -  1    Anxiety          Care Instructions    Increase prozac to 40mg and plan to follow up in 2 months.          Follow-ups after your visit        Follow-up notes from your care team     Return in about 2 months (around 12/31/2018) for depression.      Your next 10 appointments already scheduled     Nov 19, 2018  9:00 AM CST   Return Sleep Patient with ALEXANDER Castaneda CNP   [facility])    [address] [place] [id number] [phone]              Who to contact     If you have questions or need follow up information about today's clinic visit or your schedule please contact [facility] directly at [phone].  Normal or non-critical lab and imaging results will be communicated to you by MyChart, letter or phone within 4 business days after the clinic has received the results. If you do not hear from us within 7 days, please contact the clinic through [employer] or phone. If you have a critical or abnormal lab result, we will notify you by phone as soon as possible.  Submit refill requests through [employer] or call your pharmacy and they will forward the refill request to us. Please allow 3 business days for your refill to be completed.          Additional Information About Your Visit        MyChart Information     [employer] gives you secure access to your electronic health record. If you see a primary care provider, you can also send messages to your care team and make appointments. If you have questions, please call your primary care clinic.   "If you do not have a primary care provider, please call [phone] and they will assist you.        Care EveryWhere ID     This is your Care EveryWhere ID. This could be used by other organizations to access your [place] medical records  [id number]        Your Vitals Were     Pulse Temperature Height Pulse Oximetry BMI (Body Mass Index)       85 98.1  F (36.7  C) (Oral) 5' 5\" (1.651 m) 99% 29.29 kg/m2        Blood Pressure from Last 3 Encounters:   10/31/18 118/70   10/09/18 114/75   09/24/18 128/87    Weight from Last 3 Encounters:   10/31/18 176 lb (79.8 kg)   10/09/18 177 lb (80.3 kg)   09/24/18 173 lb (78.5 kg)              Today, you had the following     No orders found for display         Today's Medication Changes          These changes are accurate as of 10/31/18 11:32 AM.  If you have any questions, ask your nurse or doctor.               These medicines have changed or have updated prescriptions.        Dose/Directions    * FLUoxetine 20 MG capsule   Commonly known as:  PROzac   This may have changed:  Another medication with the same name was added. Make sure you understand how and when to take each.   Used for:  Anxiety, Moderate single current episode of major depressive disorder (H)   Changed by:  Rissa Serrano MD        TAKE 1 CAPSULE BY MOUTH EVERY DAY   Quantity:  30 capsule   Refills:  0       * FLUoxetine 40 MG capsule   Commonly known as:  PROzac   This may have changed:  You were already taking a medication with the same name, and this prescription was added. Make sure you understand how and when to take each.   Used for:  Moderate single current episode of major depressive disorder (H), Anxiety   Changed by:  Rissa Serrano MD        Dose:  40 mg   Take 1 capsule (40 mg) by mouth daily   Quantity:  30 capsule   Refills:  1       * Notice:  This list has 2 medication(s) that are the same as other medications prescribed for you. Read the directions carefully, and ask your " doctor or other care provider to review them with you.         Where to get your medicines      These medications were sent to [id number] - [place], [place] - [address]  [address], [place] [address]     Phone:  [phone]     FLUoxetine 40 MG capsule                Primary Care Provider Office Phone # Fax #    Rissa Serrano [phone] [address] [place] [address]        Equal Access to Services     [facility]: Hadii aad ku hadasho Soomaali, waaxda luqadaha, qaybta kaalmada adeegyada, waxay idiin hayaan adeeg kharash la'sierran . So [facility] [phone].    ATENCIÓN: Si habla español, tiene a hastings disposición servicios gratuitos de asistencia lingüística. [facility] [phone].    We comply with applicable federal civil rights laws and [place] laws. We do not discriminate on the basis of race, color, national origin, age, disability, sex, sexual orientation, or gender identity.            Thank you!     Thank you for choosing [facility]  for your care. Our goal is always to provide you with excellent care. Hearing back from our patients is one way we can continue to improve our services. Please take a few minutes to complete the written survey that you may receive in the mail after your visit with us. Thank you!             Your Updated Medication List - Protect others around you: Learn how to safely use, store and throw away your medicines at www.disposemymeds.org.          This list is accurate as of 10/31/18 11:32 AM.  Always use your most recent med list.                   Brand Name Dispense Instructions for use Diagnosis    acyclovir 400 MG tablet    ZOVIRAX    90 tablet    Take 1 tablet (400 mg) by mouth daily , 1 tab twice daily during outbreak    HSV-2 infection       CALCIUM 600 PO      Take 1 tablet by mouth daily        FISH OIL PO      Take 1 capsule by mouth daily        * FLUoxetine 20 MG capsule    PROzac    30  capsule    TAKE 1 CAPSULE BY MOUTH EVERY DAY    Anxiety, Moderate single current episode of major depressive disorder (H)       * FLUoxetine 40 MG capsule    PROzac    30 capsule    Take 1 capsule (40 mg) by mouth daily    Moderate single current episode of major depressive disorder (H), Anxiety       levonorgestrel 20 MCG/24HR IUD    MIRENA    1 each    1 each (20 mcg) by Intrauterine route once for 1 dose    Encounter for IUD insertion       Multi-vitamin Tabs tablet      Take 1 tablet by mouth daily        ondansetron 4 MG ODT tab    ZOFRAN-ODT    20 tablet    TAKE 1-2 TABLETS BY MOUTH EVERY 8 HOURS AS NEEDED FOR NAUSEA    Nausea       PROBIOTIC COLON SUPPORT PO      Take 1 tablet by mouth daily        UNABLE TO FIND      MEDICATION NAME: Z- patch by yazan for sleep        VITAMIN D PO      Take 1,000 Int'l Units by mouth daily        * Notice:  This list has 2 medication(s) that are the same as other medications prescribed for you. Read the directions carefully, and ask your doctor or other care provider to review them with you.

## 2018-11-01 ASSESSMENT — ANXIETY QUESTIONNAIRES: GAD7 TOTAL SCORE: 4

## 2018-11-30 ENCOUNTER — OFFICE VISIT (OUTPATIENT)
Dept: SLEEP MEDICINE | Facility: CLINIC | Age: 28
End: 2018-11-30
Payer: COMMERCIAL

## 2018-11-30 VITALS
HEART RATE: 84 BPM | OXYGEN SATURATION: 98 % | BODY MASS INDEX: 28.28 KG/M2 | DIASTOLIC BLOOD PRESSURE: 76 MMHG | WEIGHT: 176 LBS | RESPIRATION RATE: 16 BRPM | HEIGHT: 66 IN | SYSTOLIC BLOOD PRESSURE: 123 MMHG

## 2018-11-30 DIAGNOSIS — F32.A DEPRESSION, UNSPECIFIED DEPRESSION TYPE: ICD-10-CM

## 2018-11-30 DIAGNOSIS — G25.81 RESTLESS LEGS SYNDROME (RLS): ICD-10-CM

## 2018-11-30 DIAGNOSIS — G47.26 CIRCADIAN RHYTHM SLEEP DISORDER, SHIFT WORK TYPE: Primary | ICD-10-CM

## 2018-11-30 PROCEDURE — 99214 OFFICE O/P EST MOD 30 MIN: CPT | Performed by: NURSE PRACTITIONER

## 2018-11-30 NOTE — PROGRESS NOTES
Chief complaint: Patient returns with sleep diaries to identify sleep patterns and further develop a plan of care      HPI:Reports a 10-year history of sleep disorder.  She has had a previous sleep study done in 2015.  On 02/16/2015 was 161 pounds at the time.  At that time was struggling with anxiety and depression.  Her sleep efficiency was decreased at 79.3% due to prolonged wake after sleep onset.  Her arousal index was elevated at 27.1.  There is no evidence of clinically significant sleep disordered breathing with an AHI of 4.1, a supine AHI of 6.3 and an RDI of 4.1.  Snoring was light and sporadic.  Lowest O2 sat was 87.2% and the time spent below 89% was 0 minutes.  Her PLM index was 20.5, PLM arousal index was 4.5.  There were PLMs recorded during REM, but excessive muscle activity was not present.  N3 and REM sleep were both decreased.       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  Her previous sleep study did not show any significant obstructive sleep apnea, and at this point in time, her weight is up by about 12 pounds, and she did query her sister regarding her snoring behavior while may travel together.  It does sound like snoring was light and infrequent, occasional gasping.  With regard to restless legs her ferritin level was 51 ng/mL.  She does not have any classic sensations to move her legs as she does unwind prior to bedtime nor does she feel these sensations with her wake ups which are short but relatively frequent most nights.  This is likely not a big contributor to her problem.  She likely has PLMD with a possible.  She is on antidepressants and has been for quite some time, does have bruises that she does obtain on the inner aspect of her lower extremities of which she has no recollection or major concern as she has not been injury.  I have recommended safety measures that we would utilize in someone who has a  parasomnia or RBD and she has not done those at this point in time.  The likely  for her parasomnias is insufficient sleep resulting from her shiftwork.  She also extends her sleep quite significantly and she goes to an off day and may sleep in quite late if she does go out with friends.  The variance and rise time is for this past week while she was on days (but did travel to [place]) did very by 6 AM on a work day 2 as late as noon days off.  She does use a over-the-counter medication called in CPAP which has melatonin and valerian root along with other herbal products following a night shift to help initiating sleep.  She does find this to be quite successful.    Today:    Restlessness in extremities/body: admits to sleeping better with a weighted blanket for a restless feeling in extremities.  May feel restless with her wakeups, which may explain why no sensations prior to bedtime due to irregular sleep schedule as a shift worker.  No bruising, no evidence of leaving bed.  Ferritin is 51, goal is 75ng/ml or greater, willing to use gabapentin, with recheck of ferritin in 3 months. SE of gabapentin discussed.  No driving after taking gabapentin.    Recovery days between day and night shift with exceptionally long sleep: if alarm is set for a nap will just turn it off, admits some depression is likely at play, with fatigue and low mood at play.  Has to do list but low motivation to move on it.  Prozac just increased.    Diaries: 10/9/18 to 11/19/18:  Bedtime:    With night shift: 7-9A, with day shift: 9:45P, day off: 8-11P                                Sleep Latency: brief  Wakeups: frequent, ~15 mins  Return to sleep:above  Final wakeup time:with day shift and days off 6A to 9:00A    Naps:2-4hrs or more  Transition from nights to days off: doze on and off for about 24 hrs, then next 24 hrs with minimal sleep    Allergies:    No Known Allergies    Medications:    Current Outpatient Prescriptions  "  Medication Sig Dispense Refill     acyclovir (ZOVIRAX) 400 MG tablet Take 1 tablet (400 mg) by mouth daily , 1 tab twice daily during outbreak 90 tablet 3     Calcium Carbonate (CALCIUM 600 PO) Take 1 tablet by mouth daily       Cholecalciferol (VITAMIN D PO) Take 1,000 Int'l Units by mouth daily       FLUoxetine (PROZAC) 20 MG capsule TAKE 1 CAPSULE BY MOUTH EVERY DAY 30 capsule 0     FLUoxetine (PROZAC) 40 MG capsule Take 1 capsule (40 mg) by mouth daily 30 capsule 1     levonorgestrel (MIRENA) 20 MCG/24HR IUD 1 each (20 mcg) by Intrauterine route once for 1 dose 1 each 0     multivitamin, therapeutic with minerals (MULTI-VITAMIN) TABS tablet Take 1 tablet by mouth daily       Omega-3 Fatty Acids (FISH OIL PO) Take 1 capsule by mouth daily       ondansetron (ZOFRAN-ODT) 4 MG ODT tab TAKE 1-2 TABLETS BY MOUTH EVERY 8 HOURS AS NEEDED FOR NAUSEA 20 tablet 1     Probiotic Product (PROBIOTIC COLON SUPPORT PO) Take 1 tablet by mouth daily       UNABLE TO FIND MEDICATION NAME: Z- patch by yazan for sleep         Problem List:  Patient Active Problem List    Diagnosis Date Noted     Moderate single current episode of major depressive disorder (H) 07/11/2017     Priority: Medium     Encounter for IUD insertion 12/28/2015     Priority: Medium     mirena placed December 28, 2015  Remove 12/2020       Anxiety 12/15/2010     Priority: Medium     4/11/11 JUDAH score 9       CARDIOVASCULAR SCREENING; LDL GOAL LESS THAN 160 10/31/2010     Priority: Medium        Past Medical/Surgical History:  Past Medical History:   Diagnosis Date     Major depressive disorder, single episode, moderate (H) 9/07     Past Surgical History:   Procedure Laterality Date     TONSILLECTOMY  2008           Physical Examination:  Vitals: /76  Pulse 84  Resp 16  Ht 1.676 m (5' 6\")  Wt 79.8 kg (176 lb)  SpO2 98%  BMI 28.41 kg/m2  BMI= Body mass index is 28.41 kg/(m^2).         [place] Total Score 11/30/2018   Total score - [place] 4 "       GENERAL APPEARANCE: healthy, alert and no distress    Assessment/Plan:    1. Circadian rhythm disorder: shift work: will review with Dr. Quarles, is not willing to respond to alarm with shorter naps, admits depression maybe a part of this.  No straight shifts available.  Follow up in 8 wks.  2.RLS: with a likely variable presentation with onset with her shift work and irregular sleep schedule.  Some improvement with a weighted blanket.  Will add gabapentin, ferritin with a recheck in 3 months.  No driving after gabapentin  3. Depression: working hard to fill the hours with activities she likes with days off.  Multiple days off due to low census-had some trips, joined an aerial gym.    Time spent with patient is 25 minutes, of which >50% was spent in counseling, education and coordination of care.

## 2018-11-30 NOTE — MR AVS SNAPSHOT
After Visit Summary   11/30/2018    Celia Castro    MRN: [id number]           Patient Information     Date Of Birth          1990        Visit Information        Provider Department      11/30/2018 11:00 AM Courtney Michaud APRN CNP [facility]        Care Instructions    The following is recommended:    Fasting ferritin and TIBC.     Ferrous sulfate 325mg and 500mg of vitamin C _once____ daily. Most common side effects is constipation, which can be treated with increasing water and fiber or adding miralax if needed.   An iron infusion is also a possibility, but there are some side effects of this treatment with rare but possible anaphylaxis, so most of my patients elect ferrous sulfate and add gabapentin till the ferritin level rises.    Other treatments that can work well is developing a routine of stretching legs, warm bath, massage of legs with lotion, all before bedtime.  Avoiding excessive caffeine, alcohol can help as well.    Gabapentin 100mg pill, 1.5 hrs before bedtime. 1 pill for 3-5 days, if needed increase to 2 pills, then after 3-5 days can increase to 3 pills if needed.    No driving after taking gabapentin.     Let me know what pharmacy you would like [facility] to [facility]    My chart me how gabapentin is working for you?  (a month)    Follow up in 8 wks    Work on days: classes -                  Follow-ups after your visit        Follow-up notes from your care team     Return in about 2 months (around 1/30/2019).      Your next 10 appointments already scheduled     Dec 24, 2018 10:00 AM CST   SHORT with Rissa Serrano MD   [facility] ([facility])    [address] [place] [address]  [address]  [facility] [address] [phone]              Who to contact     If you have questions or need follow up information about today's clinic visit or your schedule please contact [facility] directly at  [phone].  Normal or non-critical lab and imaging results will be communicated to you by MyChart, letter or phone within 4 business days after the clinic has received the results. If you do not hear from us within 7 days, please contact the clinic through [employer] or phone. If you have a critical or abnormal lab result, we will notify you by phone as soon as possible.  Submit refill requests through [employer] or call your pharmacy and they will forward the refill request to us. Please allow 3 business days for your refill to be completed.          Additional Information About Your Visit        [employer] Information     [employer] gives you secure access to your electronic health record. If you see a primary care provider, you can also send messages to your care team and make appointments. If you have questions, please call your primary care clinic.  If you do not have a primary care provider, please call [phone] and they will assist you.        Care EveryWhere ID     This is your Care EveryWhere ID. This could be used by other organizations to access your [facility] medical records  [id number]         Blood Pressure from Last 3 Encounters:   10/31/18 118/70   10/09/18 114/75   09/24/18 128/87    Weight from Last 3 Encounters:   10/31/18 79.8 kg (176 lb)   10/09/18 80.3 kg (177 lb)   09/24/18 78.5 kg (173 lb)              Today, you had the following     No orders found for display       Primary Care Provider Office Phone # Fax #    Rissa Serrano [phone] [address] [place] [address]        Equal Access to Services     [facility]: Hadii aad ku hadasho Soomaali, waaxda luqadaha, qaybta kaalmada randy, jayashree del castillo. So [facility] [phone].    ATENCIÓN: Si habla español, tiene a hastings disposición servicios gratuitos de asistencia lingüística. Llame al [phone].    We comply with applicable federal civil rights laws and [place] laws. We  do not discriminate on the basis of race, color, national origin, age, disability, sex, sexual orientation, or gender identity.            Thank you!     Thank you for choosing [facility]  for your care. Our goal is always to provide you with excellent care. Hearing back from our patients is one way we can continue to improve our services. Please take a few minutes to complete the written survey that you may receive in the mail after your visit with us. Thank you!             Your Updated Medication List - Protect others around you: Learn how to safely use, store and throw away your medicines at www.disposemymeds.org.          This list is accurate as of 11/30/18 11:55 AM.  Always use your most recent med list.                   Brand Name Dispense Instructions for use Diagnosis    acyclovir 400 MG tablet    ZOVIRAX    90 tablet    Take 1 tablet (400 mg) by mouth daily , 1 tab twice daily during outbreak    HSV-2 infection       CALCIUM 600 PO      Take 1 tablet by mouth daily        FISH OIL PO      Take 1 capsule by mouth daily        * FLUoxetine 20 MG capsule    PROzac    30 capsule    TAKE 1 CAPSULE BY MOUTH EVERY DAY    Anxiety, Moderate single current episode of major depressive disorder (H)       * FLUoxetine 40 MG capsule    PROzac    30 capsule    Take 1 capsule (40 mg) by mouth daily    Moderate single current episode of major depressive disorder (H), Anxiety       levonorgestrel 20 MCG/24HR IUD    MIRENA    1 each    1 each (20 mcg) by Intrauterine route once for 1 dose    Encounter for IUD insertion       Multi-vitamin tablet      Take 1 tablet by mouth daily        ondansetron 4 MG ODT tab    ZOFRAN-ODT    20 tablet    TAKE 1-2 TABLETS BY MOUTH EVERY 8 HOURS AS NEEDED FOR NAUSEA    Nausea       PROBIOTIC COLON SUPPORT PO      Take 1 tablet by mouth daily        UNABLE TO FIND      MEDICATION NAME: Z- patch by yazan for sleep        VITAMIN D PO      Take 1,000 Int'l Units by  mouth daily        * Notice:  This list has 2 medication(s) that are the same as other medications prescribed for you. Read the directions carefully, and ask your doctor or other care provider to review them with you.

## 2018-11-30 NOTE — PATIENT INSTRUCTIONS
The following is recommended:    Fasting ferritin and TIBC.     Ferrous sulfate 325mg and 500mg of vitamin C _once____ daily. Most common side effects is constipation, which can be treated with increasing water and fiber or adding miralax if needed.   An iron infusion is also a possibility, but there are some side effects of this treatment with rare but possible anaphylaxis, so most of my patients elect ferrous sulfate and add gabapentin till the ferritin level rises.    Other treatments that can work well is developing a routine of stretching legs, warm bath, massage of legs with lotion, all before bedtime.  Avoiding excessive caffeine, alcohol can help as well.    Gabapentin 100mg pill, 1.5 hrs before bedtime. 1 pill for 3-5 days, if needed increase to 2 pills, then after 3-5 days can increase to 3 pills if needed.    No driving after taking gabapentin.     Let me know [facility] you [facility]    My chart me how gabapentin is working for you?  (a month)    Follow up in 8 wks    Work on days: classes -

## 2018-12-04 RX ORDER — GABAPENTIN 100 MG/1
CAPSULE ORAL
Qty: 90 CAPSULE | Refills: 1 | Status: SHIPPED | OUTPATIENT
Start: 2018-12-04 | End: 2019-02-11

## 2018-12-07 ENCOUNTER — DOCUMENTATION ONLY (OUTPATIENT)
Dept: SLEEP MEDICINE | Facility: CLINIC | Age: 28
End: 2018-12-07

## 2018-12-10 ENCOUNTER — DOCUMENTATION ONLY (OUTPATIENT)
Dept: SLEEP MEDICINE | Facility: CLINIC | Age: 28
End: 2018-12-10

## 2018-12-10 NOTE — PROGRESS NOTES
Reviewed case with Issac Quarles, PhD.  Likely combination of depression, shift work as pt expresses.  Limit over sleeping and being in bed on time off from work.  Plan out lined elsewhere.

## 2018-12-12 NOTE — PROGRESS NOTES
Reviewed case and sleep diaries with Derrek Quarles, Sleep Psychologist.  He agreed that over extending time in bed on recovery days likely is reducing sleep drive for subsequent days.      Extension of time in bed is likely due to both tiredness/fatigue some sleepiness and depression.  Celia reports can't sleep when coming off a night shift, then nap prior to shift.  Reports poorer quality of sleep when sleeping during days.     Day shift: sleeps from 10p to 6    Day off after string of days sleeps: 12:30-9, some days off with no data    Night shifts sleep 9A to-3:30-4:25, 6P  Total sleep time 61/4 hrs to gradually 81/2 hrs over the 3 day period    Day off between 2 night shifts sleeps 9A to 5P, 10P to 1A, and 4A to 2:30. approx 21/48 hrs of time in bed-possibly sleeping.  In the 1st 24 hrs slept or in bed for 16/24 hrs.      Will my chart pt, would suggest:   1. Same shift when possible/if possible  2. *Up and out of bed when not sleeping, stimulus control and with increased light  3. Baseline circadian rhythm maybe Hs midnight to 1A with wakeup at 9A (day off schedule at times)   And best alertness with lights on at time early in AM and less than 3 hrs difference between work (day shift) rise time and day off rise time.  4. *Environmental control for best quality of sleep (cool, quiet and dark).     5. Depression/anxiety: limiting desire to be out of bed with days off, turns off alarm but doesn't leave bed. Prozac just increased  Will update Celia and see where she is at in making some behavioral changes.    6. May benefit from seeing my colleague Derrek Quarles to work on a transition plan for days of work to days off.    Recommend getting gabapentin up to 300mg for 2 wks, with 2 wks of sleep diaries and above asterisked behavioral suggestions to see if there is improvement with a office visit.

## 2018-12-24 ENCOUNTER — OFFICE VISIT (OUTPATIENT)
Dept: PEDIATRICS | Facility: CLINIC | Age: 28
End: 2018-12-24
Payer: COMMERCIAL

## 2018-12-24 VITALS
HEIGHT: 66 IN | TEMPERATURE: 98.2 F | WEIGHT: 174 LBS | SYSTOLIC BLOOD PRESSURE: 110 MMHG | BODY MASS INDEX: 27.97 KG/M2 | HEART RATE: 83 BPM | DIASTOLIC BLOOD PRESSURE: 68 MMHG | OXYGEN SATURATION: 98 %

## 2018-12-24 DIAGNOSIS — F32.1 MODERATE SINGLE CURRENT EPISODE OF MAJOR DEPRESSIVE DISORDER (H): Primary | ICD-10-CM

## 2018-12-24 PROCEDURE — 99214 OFFICE O/P EST MOD 30 MIN: CPT | Performed by: PEDIATRICS

## 2018-12-24 RX ORDER — MULTIVIT-MIN/IRON/FOLIC ACID/K 18-600-40
1 CAPSULE ORAL DAILY
COMMUNITY
End: 2020-04-29

## 2018-12-24 RX ORDER — PNV NO.95/FERROUS FUM/FOLIC AC 28MG-0.8MG
1 TABLET ORAL DAILY
COMMUNITY
End: 2020-04-29

## 2018-12-24 ASSESSMENT — ANXIETY QUESTIONNAIRES
7. FEELING AFRAID AS IF SOMETHING AWFUL MIGHT HAPPEN: NOT AT ALL
1. FEELING NERVOUS, ANXIOUS, OR ON EDGE: NOT AT ALL
3. WORRYING TOO MUCH ABOUT DIFFERENT THINGS: NOT AT ALL
GAD7 TOTAL SCORE: 1
5. BEING SO RESTLESS THAT IT IS HARD TO SIT STILL: NOT AT ALL
2. NOT BEING ABLE TO STOP OR CONTROL WORRYING: NOT AT ALL
IF YOU CHECKED OFF ANY PROBLEMS ON THIS QUESTIONNAIRE, HOW DIFFICULT HAVE THESE PROBLEMS MADE IT FOR YOU TO DO YOUR WORK, TAKE CARE OF THINGS AT HOME, OR GET ALONG WITH OTHER PEOPLE: NOT DIFFICULT AT ALL
6. BECOMING EASILY ANNOYED OR IRRITABLE: SEVERAL DAYS

## 2018-12-24 ASSESSMENT — PATIENT HEALTH QUESTIONNAIRE - PHQ9
SUM OF ALL RESPONSES TO PHQ QUESTIONS 1-9: 8
5. POOR APPETITE OR OVEREATING: NOT AT ALL

## 2018-12-24 ASSESSMENT — MIFFLIN-ST. JEOR: SCORE: 1526.01

## 2018-12-24 NOTE — PATIENT INSTRUCTIONS
Increase to 60mg for atleast 4 weeks - if still feeling low energy/tired, try increase to max dose 80mg.    Follow-up with me in 2-3 months.

## 2018-12-24 NOTE — PROGRESS NOTES
"  SUBJECTIVE:   Celia Castro is a 28 year old female who presents to clinic today for the following health issues:      Depression and Anxiety Follow-Up    Status since last visit: Improved  - mood while she is awake is better, but still feels down/tired and low energy on her days off - trying to figure out what to do with her time. 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  She is working with a sleep specialist.     Other associated symptoms:None    Complicating factors:     Significant life event: No     Current substance abuse: None    PHQ 3/27/2018 9/20/2018 10/31/2018   PHQ-9 Total Score 6 5 8   Q9: Suicide Ideation Not at all Not at all Not at all     JUDAH-7 SCORE 11/29/2017 3/27/2018 10/31/2018   Total Score - - -   Total Score 0 1 4       PHQ-9  English  PHQ-9   Any Language  JUDAH-7  Suicide Assessment Five-step Evaluation and Treatment (SAFE-T)      Problem list and histories reviewed & adjusted, as indicated.  Additional history: as documented      Reviewed and updated as needed this visit by clinical staff       Reviewed and updated as needed this visit by Provider         ROS:  Constitutional, HEENT, cardiovascular, pulmonary, gi and gu systems are negative, except as otherwise noted.    OBJECTIVE:     /68 (BP Location: Right arm, Cuff Size: Adult Large)   Pulse 83   Temp 98.2  F (36.8  C) (Oral)   Ht 1.676 m (5' 6\")   Wt 78.9 kg (174 lb)   SpO2 98%   BMI 28.08 kg/m    Body mass index is 28.08 kg/m .  GENERAL APPEARANCE: healthy, alert and no distress  RESP: lungs clear to auscultation - no rales, rhonchi or wheezes  CV: regular rates and rhythm, normal S1 S2, no S3 or S4 and no murmur, click or rub    Diagnostic Test Results:  none     ASSESSMENT/PLAN:         1. Moderate single current episode of major depressive disorder (H)  Uncontrolled - see PI  - FLUoxetine (PROZAC) 20 MG capsule; Take 3 tablets  (60mg) for 4 weeks, then increase to 4 tablets (80mg).  Dispense: 360 capsule; Refill: " 0    Patient Instructions   Increase to 60mg for atleast 4 weeks - if still feeling low energy/tired, try increase to max dose 80mg.    Follow-up with me in 2-3 months.       Rissa Serrano MD  [facility]

## 2018-12-25 ASSESSMENT — ANXIETY QUESTIONNAIRES: GAD7 TOTAL SCORE: 1

## 2019-01-22 DIAGNOSIS — F41.9 ANXIETY: ICD-10-CM

## 2019-01-22 DIAGNOSIS — F32.1 MODERATE SINGLE CURRENT EPISODE OF MAJOR DEPRESSIVE DISORDER (H): ICD-10-CM

## 2019-01-22 NOTE — TELEPHONE ENCOUNTER
"Requested Prescriptions   Pending Prescriptions Disp Refills     FLUoxetine (PROZAC) 40 MG capsule [Pharmacy Med Name: FLUOXETINE HCL 40 MG CAPSULE]    Last Written Prescription Date:  12/24/2018  Last Fill Quantity: 360,  # refills: 0   Last office visit: 12/24/2018 with prescribing provider:  Rissa Serrano     Future Office Visit:     30 capsule 1     Sig: TAKE 1 CAPSULE BY MOUTH EVERY DAY    SSRIs Protocol Failed - 1/22/2019  1:40 AM       Failed - PHQ-9 score less than 5 in past 6 months    Please review last PHQ-9 score.     PHQ-9 SCORE 9/20/2018 10/31/2018 12/24/2018   PHQ-9 Total Score - - -   PHQ-9 Total Score MyChart 5 (Mild depression) - -   PHQ-9 Total Score 5 8 8     JUDAH-7 SCORE 3/27/2018 10/31/2018 12/24/2018   Total Score - - -   Total Score 1 4 1              Passed - Medication is active on med list       Passed - Patient is age 18 or older       Passed - No active pregnancy on record       Passed - No positive pregnancy test in last 12 months       Passed - Recent (6 mo) or future (30 days) visit within the authorizing provider's specialty    Patient had office visit in the last 6 months or has a visit in the next 30 days with authorizing provider or within the authorizing provider's specialty.  See \"Patient Info\" tab in inbasket, or \"Choose Columns\" in Meds & Orders section of the refill encounter.              "

## 2019-01-23 RX ORDER — FLUOXETINE 40 MG/1
CAPSULE ORAL
Qty: 30 CAPSULE | Refills: 1 | OUTPATIENT
Start: 2019-01-23

## 2019-02-11 RX ORDER — GABAPENTIN 100 MG/1
CAPSULE ORAL
Qty: 90 CAPSULE | Refills: 1 | Status: SHIPPED | OUTPATIENT
Start: 2019-02-11 | End: 2019-08-05

## 2019-02-11 NOTE — TELEPHONE ENCOUNTER
Pending Prescriptions:                       Disp   Refills    gabapentin (NEURONTIN) 100 MG capsule     90 cap*1            Si pill 1.5 hrs before bedtime, may increase by 1           pill every 3-5 days as needed to total of 3 pills    Last Written Prescription Date:  18  Last Fill Quantity: 90,   # refills: 1  Last Office Visit with [facility], [facility] or [facility] prescribing provider: 2018  Future Office visit: No follow up scheduled at this time.    BEENA Nguyen

## 2019-04-28 DIAGNOSIS — B00.9 HSV-2 INFECTION: ICD-10-CM

## 2019-04-29 RX ORDER — ACYCLOVIR 400 MG/1
400 TABLET ORAL DAILY
Qty: 30 TABLET | Refills: 0 | Status: SHIPPED | OUTPATIENT
Start: 2019-04-29 | End: 2019-09-05

## 2019-04-29 NOTE — TELEPHONE ENCOUNTER
"Requested Prescriptions   Pending Prescriptions Disp Refills     acyclovir (ZOVIRAX) 400 MG tablet [Pharmacy Med Name: ACYCLOVIR 400 MG TABLET] 90 tablet 2     Sig: TAKE 1 TABLET (400 MG) BY MOUTH DAILY , 1 TAB TWICE DAILY DURING OUTBREAK       Antivirals for Herpes Protocol Failed - 4/28/2019 12:27 AM        Failed - Recent (12 mo) or future (30 days) visit within the authorizing provider's specialty     Patient had office visit in the last 12 months or has a visit in the next 30 days with authorizing provider or within the authorizing provider's specialty.  See \"Patient Info\" tab in inbasket, or \"Choose Columns\" in Meds & Orders section of the refill encounter.              Failed - Normal serum creatinine on file in past 12 months     Recent Labs   Lab Test 10/25/12  1129   CR 0.74             Passed - Patient is age 12 or older        Passed - Medication is active on med list        Medication is being filled for 1 time refill only due to:  pt needs an appointment for further refills   Tana Noe RN on 4/29/2019 at 7:53 AM      "

## 2019-06-19 DIAGNOSIS — B00.9 HSV-2 INFECTION: ICD-10-CM

## 2019-06-19 DIAGNOSIS — R11.0 NAUSEA: ICD-10-CM

## 2019-06-19 RX ORDER — ONDANSETRON 4 MG/1
TABLET, ORALLY DISINTEGRATING ORAL
Qty: 20 TABLET | Refills: 0 | Status: SHIPPED | OUTPATIENT
Start: 2019-06-19 | End: 2019-07-28

## 2019-06-19 RX ORDER — ACYCLOVIR 400 MG/1
400 TABLET ORAL DAILY
Qty: 90 TABLET | Refills: 2 | OUTPATIENT
Start: 2019-06-19

## 2019-06-19 NOTE — TELEPHONE ENCOUNTER
"Requested Prescriptions   Pending Prescriptions Disp Refills     ondansetron (ZOFRAN-ODT) 4 MG ODT tab [Pharmacy Med Name: ONDANSETRON ODT 4 MG TABLET]    Last Written Prescription Date:  6/12/2018  Last Fill Quantity: 20,  # refills: 1   Last office visit: 12/24/2018 with prescribing provider:  Rissa Serrano     Future Office Visit:     20 tablet 0     Sig: TAKE 1-2 TABLETS BY MOUTH EVERY 8 HOURS AS NEEDED FOR NAUSEA        Antivertigo/Antiemetic Agents Passed - 6/19/2019 12:46 AM        Passed - Recent (12 mo) or future (30 days) visit within the authorizing provider's specialty     Patient had office visit in the last 12 months or has a visit in the next 30 days with authorizing provider or within the authorizing provider's specialty.  See \"Patient Info\" tab in inbasket, or \"Choose Columns\" in Meds & Orders section of the refill encounter.              Passed - Medication is active on med list        Passed - Patient is 18 years of age or older          "

## 2019-06-19 NOTE — TELEPHONE ENCOUNTER
Prescription approved per [facility] Refill Protocol.   Dominga Snyder RN

## 2019-06-19 NOTE — TELEPHONE ENCOUNTER
"Requested Prescriptions   Pending Prescriptions Disp Refills     acyclovir (ZOVIRAX) 400 MG tablet [Pharmacy Med Name: ACYCLOVIR 400 MG TABLET] 90 tablet 2     Sig: TAKE 1 TABLET (400 MG) BY MOUTH DAILY , 1 TAB TWICE DAILY DURING OUTBREAK       Antivirals for Herpes Protocol Failed - 6/19/2019 12:46 AM        Failed - Recent (12 mo) or future (30 days) visit within the authorizing provider's specialty     Patient had office visit in the last 12 months or has a visit in the next 30 days with authorizing provider or within the authorizing provider's specialty.  See \"Patient Info\" tab in inbasket, or \"Choose Columns\" in Meds & Orders section of the refill encounter.              Failed - Normal serum creatinine on file in past 12 months     Recent Labs   Lab Test 10/25/12  1129   CR 0.74             Passed - Patient is age 12 or older        Passed - Medication is active on med list      Last Written Prescription Date:  4/29/19  Last Fill Quantity: 30,  # refills: 0   Last office visit: 3/27/2018 with prescribing provider:  Rory   Future Office Visit:    Pt due for annual, no appt scheduled. Pt already received one month extension. Rx denied.   "

## 2019-07-28 DIAGNOSIS — F32.1 MODERATE SINGLE CURRENT EPISODE OF MAJOR DEPRESSIVE DISORDER (H): ICD-10-CM

## 2019-07-28 DIAGNOSIS — R11.0 NAUSEA: ICD-10-CM

## 2019-07-29 RX ORDER — ONDANSETRON 4 MG/1
TABLET, ORALLY DISINTEGRATING ORAL
Qty: 20 TABLET | Refills: 0 | Status: SHIPPED | OUTPATIENT
Start: 2019-07-29 | End: 2020-04-29

## 2019-07-29 NOTE — TELEPHONE ENCOUNTER
Patient has refills remaining with requesting pharmacy.  Vandana LUNDY RN   [facility]

## 2019-07-29 NOTE — TELEPHONE ENCOUNTER
"Requested Prescriptions   Pending Prescriptions Disp Refills     ondansetron (ZOFRAN-ODT) 4 MG ODT tab [Pharmacy Med Name: ONDANSETRON ODT 4 MG TABLET]    Last Written Prescription Date:  6/19/2019  Last Fill Quantity: 20,  # refills: 0   Last office visit: 12/24/2018 with prescribing provider:  Rissa Serrano     Future Office Visit:     20 tablet 0     Sig: TAKE 1-2 TABLETS BY MOUTH EVERY 8 HOURS AS NEEDED FOR NAUSEA        Antivertigo/Antiemetic Agents Passed - 7/28/2019  8:37 PM        Passed - Recent (12 mo) or future (30 days) visit within the authorizing provider's specialty     Patient had office visit in the last 12 months or has a visit in the next 30 days with authorizing provider or within the authorizing provider's specialty.  See \"Patient Info\" tab in inbasket, or \"Choose Columns\" in Meds & Orders section of the refill encounter.              Passed - Medication is active on med list        Passed - Patient is 18 years of age or older          "

## 2019-07-29 NOTE — TELEPHONE ENCOUNTER
"Requested Prescriptions   Pending Prescriptions Disp Refills     FLUoxetine (PROZAC) 20 MG capsule [Pharmacy Med Name: FLUOXETINE HCL 20 MG CAPSULE]    Last Written Prescription Date:  12/24/2018  Last Fill Quantity: 360,  # refills: 0   Last office visit: 12/24/2018 with prescribing provider:  Rissa Serrano     Future Office Visit:     120 capsule 2     Sig: TAKE 3 TABLETS (60MG) FOR 4 WEEKS, THEN INCREASE TO 4 TABLETS (80MG).       SSRIs Protocol Failed - 7/28/2019  8:37 PM        Failed - PHQ-9 score less than 5 in past 6 months     Please review last PHQ-9 score.     PHQ-9 SCORE 10/31/2018 12/24/2018 8/5/2019   PHQ-9 Total Score - - -   PHQ-9 Total Score MyChart - - 7 (Mild depression)   PHQ-9 Total Score 8 8 7     JUDAH-7 SCORE 10/31/2018 12/24/2018 8/5/2019   Total Score - - -   Total Score - - 0 (minimal anxiety)   Total Score 4 1 0                 Failed - Recent (6 mo) or future (30 days) visit within the authorizing provider's specialty     Patient had office visit in the last 6 months or has a visit in the next 30 days with authorizing provider or within the authorizing provider's specialty.  See \"Patient Info\" tab in inbasket, or \"Choose Columns\" in Meds & Orders section of the refill encounter.            Passed - Medication is active on med list        Passed - Patient is age 18 or older        Passed - No active pregnancy on record        Passed - No positive pregnancy test in last 12 months            "

## 2019-07-30 ENCOUNTER — MYC MEDICAL ADVICE (OUTPATIENT)
Dept: FAMILY MEDICINE | Facility: CLINIC | Age: 29
End: 2019-07-30

## 2019-08-05 RX ORDER — FLUOXETINE 40 MG/1
80 CAPSULE ORAL DAILY
Qty: 60 CAPSULE | Refills: 0 | Status: SHIPPED | OUTPATIENT
Start: 2019-08-05 | End: 2019-08-12

## 2019-08-05 NOTE — TELEPHONE ENCOUNTER
One month sent - patient needs evisit/phone or in person visit within the next month.  Please inform her.    Rissa Serrano MD  Internal Medicine/Pediatrics  [facility]

## 2019-08-05 NOTE — TELEPHONE ENCOUNTER
Patient needs appointment but has completed questionnaires:    PHQ-9 SCORE 10/31/2018 12/24/2018 8/5/2019   PHQ-9 Total Score - - -   PHQ-9 Total Score MyChart - - 7 (Mild depression)   PHQ-9 Total Score 8 8 7     JUDAH-7 SCORE 10/31/2018 12/24/2018 8/5/2019   Total Score - - -   Total Score - - 0 (minimal anxiety)   Total Score 4 1 0     Routing refill request to provider for review/approval because:  Patient needs to be seen because:  Last OV 12/2018

## 2019-08-12 ENCOUNTER — E-VISIT (OUTPATIENT)
Dept: PEDIATRICS | Facility: CLINIC | Age: 29
End: 2019-08-12
Payer: COMMERCIAL

## 2019-08-12 DIAGNOSIS — F32.1 MODERATE SINGLE CURRENT EPISODE OF MAJOR DEPRESSIVE DISORDER (H): ICD-10-CM

## 2019-08-12 PROCEDURE — 99444 ZZC PHYSICIAN ONLINE EVALUATION & MANAGEMENT SERVICE: CPT | Performed by: PEDIATRICS

## 2019-08-12 RX ORDER — FLUOXETINE 40 MG/1
80 CAPSULE ORAL DAILY
Qty: 180 CAPSULE | Refills: 1 | Status: SHIPPED | OUTPATIENT
Start: 2019-08-12 | End: 2020-04-15

## 2019-08-12 ASSESSMENT — ANXIETY QUESTIONNAIRES
GAD7 TOTAL SCORE: 1
6. BECOMING EASILY ANNOYED OR IRRITABLE: NOT AT ALL
3. WORRYING TOO MUCH ABOUT DIFFERENT THINGS: NOT AT ALL
GAD7 TOTAL SCORE: 1
2. NOT BEING ABLE TO STOP OR CONTROL WORRYING: NOT AT ALL
1. FEELING NERVOUS, ANXIOUS, OR ON EDGE: SEVERAL DAYS
5. BEING SO RESTLESS THAT IT IS HARD TO SIT STILL: NOT AT ALL
7. FEELING AFRAID AS IF SOMETHING AWFUL MIGHT HAPPEN: NOT AT ALL
4. TROUBLE RELAXING: NOT AT ALL
GAD7 TOTAL SCORE: 1
7. FEELING AFRAID AS IF SOMETHING AWFUL MIGHT HAPPEN: NOT AT ALL

## 2019-08-12 ASSESSMENT — PATIENT HEALTH QUESTIONNAIRE - PHQ9
SUM OF ALL RESPONSES TO PHQ QUESTIONS 1-9: 7
SUM OF ALL RESPONSES TO PHQ QUESTIONS 1-9: 7
10. IF YOU CHECKED OFF ANY PROBLEMS, HOW DIFFICULT HAVE THESE PROBLEMS MADE IT FOR YOU TO DO YOUR WORK, TAKE CARE OF THINGS AT HOME, OR GET ALONG WITH OTHER PEOPLE: SOMEWHAT DIFFICULT

## 2019-08-13 ASSESSMENT — PATIENT HEALTH QUESTIONNAIRE - PHQ9: SUM OF ALL RESPONSES TO PHQ QUESTIONS 1-9: 7

## 2019-08-13 ASSESSMENT — ANXIETY QUESTIONNAIRES: GAD7 TOTAL SCORE: 1

## 2019-09-04 ENCOUNTER — MYC MEDICAL ADVICE (OUTPATIENT)
Dept: OBGYN | Facility: CLINIC | Age: 29
End: 2019-09-04

## 2019-09-04 DIAGNOSIS — B00.9 HSV-2 INFECTION: ICD-10-CM

## 2019-09-05 RX ORDER — ACYCLOVIR 400 MG/1
400 TABLET ORAL DAILY
Qty: 30 TABLET | Refills: 0 | Status: SHIPPED | OUTPATIENT
Start: 2019-09-05 | End: 2019-09-13

## 2019-09-05 NOTE — TELEPHONE ENCOUNTER
Pt has her annual appt set up for 9/13/19.  Requesting refill of valtrex for current outbreak.    Will send out one fill of Valtrex  [facility]  Med is pended

## 2019-09-05 NOTE — TELEPHONE ENCOUNTER
Also left msg to call back or [facility] msg with her preferred pharmacy  Tana Noe RN on 9/5/2019 at 11:45 AM

## 2019-09-10 NOTE — PROGRESS NOTES
Celia is a 28 year old No obstetric history on file. female who presents for annual exam.     Besides routine health maintenance, she has no other health concerns today .    HPI:  The patient's PCP is Rissa Serrano MD.  Pt here today for her annual exam. She is feeling well. No concerns.     IUD in place. Due for exchange in 2020. Amenorrheic. No dyspareunia. New sexual partner. Requesting full STD testing. 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 First assignment is in [place] starting 2019 for 13 weeks.     Pap NIL in 2018, will pap every 3 years.     Will do baseline fasting labs this year. Father with stroke 2 years ago, no deficits. Doing well.       GYNECOLOGIC HISTORY:    No LMP recorded. (Menstrual status: IUD).  Her current contraception method is: IUD.  She  reports that she quit smoking about 4 years ago. She has never used smokeless tobacco.    Patient is sexually active.  STD testing offered?  Accepted  Last PHQ-9 score on record =   PHQ-9 SCORE 2019   PHQ-9 Total Score -   PHQ-9 Total Score MyChart -   PHQ-9 Total Score 4     Last GAD7 score on record =   JUDAH-7 SCORE 2019   Total Score -   Total Score -   Total Score 0     Alcohol Score = 5    HEALTH MAINTENANCE:  Cholesterol: labs done with pcp   Last Mammo: NA, due at age 38  Pap: 3/27/18 neg  Colonoscopy: NA, due at age 48  Dexa: Never    Health maintenance updated:  yes    HISTORY:  OB History    Para Term  AB Living   0 0 0 0 0 0   SAB TAB Ectopic Multiple Live Births   0 0 0 0 0       Patient Active Problem List   Diagnosis     CARDIOVASCULAR SCREENING; LDL GOAL LESS THAN 160     Anxiety     Encounter for IUD insertion     Moderate single current episode of major depressive disorder (H)     Past Surgical History:   Procedure Laterality Date     TONSILLECTOMY        Social History     Tobacco Use     Smoking status: Former Smoker     Last attempt  "to quit: 2014     Years since quittin.8     Smokeless tobacco: Never Used     Tobacco comment: socially    Substance Use Topics     Alcohol use: Yes     Alcohol/week: 1.2 oz     Types: 2 Standard drinks or equivalent per week     Comment: socailly       Problem (# of Occurrences) Relation (Name,Age of Onset)    Bipolar Disorder (1) Father (70)    Breast Cancer (2) Paternal Grandmother, Mother (60)    Cancer - colorectal (1) Paternal Grandfather    Diabetes (1) Father    Hypertension (1) Father            Current Outpatient Medications   Medication Sig     acyclovir (ZOVIRAX) 400 MG tablet Take 1 tablet (400 mg) by mouth daily , 1 tab twice daily during outbreak     Ascorbic Acid (VITAMIN C) 500 MG CAPS Take 1 tablet by mouth daily     Ferrous Sulfate (IRON) 325 (65 Fe) MG tablet Take 1 tablet by mouth daily     FLUoxetine (PROZAC) 40 MG capsule Take 2 capsules (80 mg) by mouth daily     levonorgestrel (MIRENA) 20 MCG/24HR IUD 1 each (20 mcg) by Intrauterine route once for 1 dose     ondansetron (ZOFRAN-ODT) 4 MG ODT tab TAKE 1-2 TABLETS BY MOUTH EVERY 8 HOURS AS NEEDED FOR NAUSEA     UNABLE TO FIND MEDICATION NAME: Z- patch by yazan for sleep     No current facility-administered medications for this visit.      No Known Allergies    Past medical, surgical, social and family histories were reviewed and updated in EPIC.    ROS:   12 point review of systems negative other than symptoms noted below.    EXAM:  /70   Pulse 78   Ht 1.676 m (5' 6\")   Wt 77.9 kg (171 lb 12.8 oz)   BMI 27.73 kg/m     BMI: Body mass index is 27.73 kg/m .    PHYSICAL EXAM:  Constitutional:  Appearance: Well nourished, well developed, alert, in no acute distress  Neck:  Lymph Nodes:  No lymphadenopathy present    Thyroid:  Gland size normal, nontender, no nodules or masses present  on palpation  Chest:  Respiratory Effort:  Breathing unlabored  Cardiovascular:    Heart: Auscultation:  Regular rate, normal rhythm, no murmurs " present  Breasts: Inspection of Breasts:  No lymphadenopathy present., Palpation of Breasts and Axillae:  No masses present on palpation, no breast tenderness., Axillary Lymph Nodes:  No lymphadenopathy present. and No nodularity, asymmetry or nipple discharge bilaterally.  Gastrointestinal:   Abdominal Examination:  Abdomen nontender to palpation, tone normal without rigidity or guarding, no masses present, umbilicus without lesions   Liver and Spleen:  No hepatomegaly present, liver nontender to palpation    Hernias:  No hernias present  Lymphatic: Lymph Nodes:  No other lymphadenopathy present  Skin:  General Inspection:  No rashes present, no lesions present, no areas of  discoloration    Genitalia and Groin:  No rashes present, no lesions present, no areas of  discoloration, no masses present  Neurologic/Psychiatric:    Mental Status:  Oriented X3     Pelvic Exam:  External Genitalia:     Normal appearance for age, no discharge present, no tenderness present, no inflammatory lesions present, color normal  Vagina:    Normal vaginal vault without central or paravaginal defects, no discharge present, no inflammatory lesions present, no masses present  Bladder:     Nontender to palpation  Urethra:   Urethral Body:  Urethra palpation normal, urethra structural support normal   Urethral Meatus:  No erythema or lesions present  Cervix:     Appearance healthy, no lesions present, nontender to palpation, no bleeding present, string present  Uterus:     Nontender to palpation, no masses present, position anteflexed, mobility: normal  Adnexa:     No adnexal tenderness present, no adnexal masses present  Perineum:     Perineum within normal limits, no evidence of trauma, no rashes or skin lesions present  Anus:     Anus within normal limits, no hemorrhoids present  Inguinal Lymph Nodes:     No lymphadenopathy present  Pubic Hair:     Normal pubic hair distribution for age  Genitalia and Groin:     No rashes present, no  lesions present, no areas of discoloration, no masses present      COUNSELING:   Special attention given to:        Regular exercise       Healthy diet/nutrition       Contraception       Safe sex practices/STD prevention    BMI: Body mass index is 27.73 kg/m .  Weight management plan: Discussed healthy diet and exercise guidelines    ASSESSMENT:  28 year old female with satisfactory annual exam.    ICD-10-CM    1. Encounter for gynecological examination without abnormal finding Z01.419    2. Screen for STD (sexually transmitted disease) Z11.3 NEISSERIA GONORRHOEA PCR     Treponema Abs w Reflex to RPR and Titer     Herpes Simplex Virus 1 and 2 IgG     HIV Antigen Antibody Combo   3. Screening for chlamydial disease Z11.8 CHLAMYDIA TRACHOMATIS PCR   4. HSV-2 infection B00.9 acyclovir (ZOVIRAX) 400 MG tablet   5. Immunization counseling Z71.89 Varicella Zoster Virus Antibody IgG     Mumps Immune Status, IgG     Rubella Antibody IgG Quantitative     Rubeola Antibody IgG     Quantiferon TB Gold Plus   6. Screening for diabetes mellitus Z13.1 Glucose, whole blood   7. Encounter for lipid screening for cardiovascular disease Z13.220 Lipid Profile (Chol, Trig, HDL, LDL calc)    Z13.6        PLAN:  Normal gyn exam. Will update on all labs. Acyclovir refilled. IUD in place. Fasting labs in future.     ALEXANDER Last CNP

## 2019-09-13 ENCOUNTER — OFFICE VISIT (OUTPATIENT)
Dept: OBGYN | Facility: CLINIC | Age: 29
End: 2019-09-13
Payer: COMMERCIAL

## 2019-09-13 VITALS
HEIGHT: 66 IN | WEIGHT: 171.8 LBS | BODY MASS INDEX: 27.61 KG/M2 | HEART RATE: 78 BPM | SYSTOLIC BLOOD PRESSURE: 122 MMHG | DIASTOLIC BLOOD PRESSURE: 70 MMHG

## 2019-09-13 DIAGNOSIS — Z13.6 ENCOUNTER FOR LIPID SCREENING FOR CARDIOVASCULAR DISEASE: ICD-10-CM

## 2019-09-13 DIAGNOSIS — Z13.1 SCREENING FOR DIABETES MELLITUS: ICD-10-CM

## 2019-09-13 DIAGNOSIS — B00.9 HSV-2 INFECTION: ICD-10-CM

## 2019-09-13 DIAGNOSIS — Z11.8 SCREENING FOR CHLAMYDIAL DISEASE: ICD-10-CM

## 2019-09-13 DIAGNOSIS — Z13.220 ENCOUNTER FOR LIPID SCREENING FOR CARDIOVASCULAR DISEASE: ICD-10-CM

## 2019-09-13 DIAGNOSIS — Z11.3 SCREEN FOR STD (SEXUALLY TRANSMITTED DISEASE): ICD-10-CM

## 2019-09-13 DIAGNOSIS — Z01.419 ENCOUNTER FOR GYNECOLOGICAL EXAMINATION WITHOUT ABNORMAL FINDING: Primary | ICD-10-CM

## 2019-09-13 DIAGNOSIS — Z71.85 IMMUNIZATION COUNSELING: ICD-10-CM

## 2019-09-13 PROCEDURE — 86787 VARICELLA-ZOSTER ANTIBODY: CPT | Performed by: NURSE PRACTITIONER

## 2019-09-13 PROCEDURE — 36415 COLL VENOUS BLD VENIPUNCTURE: CPT | Performed by: NURSE PRACTITIONER

## 2019-09-13 PROCEDURE — 86765 RUBEOLA ANTIBODY: CPT | Performed by: NURSE PRACTITIONER

## 2019-09-13 PROCEDURE — 87389 HIV-1 AG W/HIV-1&-2 AB AG IA: CPT | Performed by: NURSE PRACTITIONER

## 2019-09-13 PROCEDURE — 87491 CHLMYD TRACH DNA AMP PROBE: CPT | Performed by: NURSE PRACTITIONER

## 2019-09-13 PROCEDURE — 86481 TB AG RESPONSE T-CELL SUSP: CPT | Performed by: NURSE PRACTITIONER

## 2019-09-13 PROCEDURE — 99395 PREV VISIT EST AGE 18-39: CPT | Performed by: NURSE PRACTITIONER

## 2019-09-13 PROCEDURE — 86735 MUMPS ANTIBODY: CPT | Performed by: NURSE PRACTITIONER

## 2019-09-13 PROCEDURE — 86695 HERPES SIMPLEX TYPE 1 TEST: CPT | Performed by: NURSE PRACTITIONER

## 2019-09-13 PROCEDURE — 86696 HERPES SIMPLEX TYPE 2 TEST: CPT | Performed by: NURSE PRACTITIONER

## 2019-09-13 PROCEDURE — 87591 N.GONORRHOEAE DNA AMP PROB: CPT | Performed by: NURSE PRACTITIONER

## 2019-09-13 PROCEDURE — 86762 RUBELLA ANTIBODY: CPT | Performed by: NURSE PRACTITIONER

## 2019-09-13 PROCEDURE — 86780 TREPONEMA PALLIDUM: CPT | Performed by: NURSE PRACTITIONER

## 2019-09-13 RX ORDER — ACYCLOVIR 400 MG/1
400 TABLET ORAL DAILY
Qty: 90 TABLET | Refills: 3 | Status: SHIPPED | OUTPATIENT
Start: 2019-09-13 | End: 2020-05-31

## 2019-09-13 ASSESSMENT — ANXIETY QUESTIONNAIRES
6. BECOMING EASILY ANNOYED OR IRRITABLE: NOT AT ALL
IF YOU CHECKED OFF ANY PROBLEMS ON THIS QUESTIONNAIRE, HOW DIFFICULT HAVE THESE PROBLEMS MADE IT FOR YOU TO DO YOUR WORK, TAKE CARE OF THINGS AT HOME, OR GET ALONG WITH OTHER PEOPLE: NOT DIFFICULT AT ALL
3. WORRYING TOO MUCH ABOUT DIFFERENT THINGS: NOT AT ALL
5. BEING SO RESTLESS THAT IT IS HARD TO SIT STILL: NOT AT ALL
GAD7 TOTAL SCORE: 0
2. NOT BEING ABLE TO STOP OR CONTROL WORRYING: NOT AT ALL
1. FEELING NERVOUS, ANXIOUS, OR ON EDGE: NOT AT ALL
7. FEELING AFRAID AS IF SOMETHING AWFUL MIGHT HAPPEN: NOT AT ALL

## 2019-09-13 ASSESSMENT — PATIENT HEALTH QUESTIONNAIRE - PHQ9
5. POOR APPETITE OR OVEREATING: NOT AT ALL
SUM OF ALL RESPONSES TO PHQ QUESTIONS 1-9: 4

## 2019-09-13 ASSESSMENT — MIFFLIN-ST. JEOR: SCORE: 1516.03

## 2019-09-14 LAB
HSV1 IGG SERPL QL IA: 2.7 AI (ref 0–0.8)
HSV2 IGG SERPL QL IA: <0.2 AI (ref 0–0.8)
MEV IGG SER QL IA: 4.6 AI (ref 0–0.8)
MUV IGG SER QL IA: 3.8 AI (ref 0–0.8)
RUBV IGG SERPL IA-ACNC: 19 IU/ML
T PALLIDUM AB SER QL: NONREACTIVE
VZV IGG SER QL IA: 1.6 AI (ref 0–0.8)

## 2019-09-14 ASSESSMENT — ANXIETY QUESTIONNAIRES: GAD7 TOTAL SCORE: 0

## 2019-09-16 LAB
GAMMA INTERFERON BACKGROUND BLD IA-ACNC: 0.04 IU/ML
HIV 1+2 AB+HIV1 P24 AG SERPL QL IA: NONREACTIVE
M TB IFN-G BLD-IMP: NEGATIVE
M TB IFN-G CD4+ BCKGRND COR BLD-ACNC: >10 IU/ML
MITOGEN IGNF BCKGRD COR BLD-ACNC: 0 IU/ML
MITOGEN IGNF BCKGRD COR BLD-ACNC: 0.02 IU/ML

## 2019-10-04 ENCOUNTER — MYC MEDICAL ADVICE (OUTPATIENT)
Dept: OBGYN | Facility: CLINIC | Age: 29
End: 2019-10-04

## 2019-10-22 ENCOUNTER — OFFICE VISIT (OUTPATIENT)
Dept: OBGYN | Facility: CLINIC | Age: 29
End: 2019-10-22
Payer: COMMERCIAL

## 2019-10-22 VITALS — WEIGHT: 168 LBS | SYSTOLIC BLOOD PRESSURE: 112 MMHG | DIASTOLIC BLOOD PRESSURE: 64 MMHG | BODY MASS INDEX: 27.12 KG/M2

## 2019-10-22 DIAGNOSIS — B00.9 HSV-1 INFECTION: Primary | ICD-10-CM

## 2019-10-22 PROCEDURE — 99213 OFFICE O/P EST LOW 20 MIN: CPT | Performed by: NURSE PRACTITIONER

## 2019-10-22 RX ORDER — VALACYCLOVIR HYDROCHLORIDE 1 G/1
1000 TABLET, FILM COATED ORAL 2 TIMES DAILY
Qty: 20 TABLET | Refills: 0 | Status: SHIPPED | OUTPATIENT
Start: 2019-10-22 | End: 2019-11-04

## 2019-10-22 NOTE — PROGRESS NOTES
SUBJECTIVE:                                                   Celia Castro is a 28 year old female who presents to clinic today for the following health issue(s):  Patient presents with:  Follow Up: Herpes      HPI:  Pt here today because she has a genital herpes outbreak that does not seem to be relenting despite acyclovir 400mg BID. 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 She is movign this weekend into a new apartment.     No LMP recorded. (Menstrual status: IUD)..     Patient is sexually active, .  Using IUD for contraception.    reports that she quit smoking about 4 years ago. She has never used smokeless tobacco.  Tobacco Cessation Action Plan: Former smoker  STD testing offered?  Declined    Health maintenance updated:  yes    Today's PHQ-2 Score:   PHQ-2 (  [employer]) 2016   Q1: Little interest or pleasure in doing things 0   Q2: Feeling down, depressed or hopeless 0   PHQ-2 Score 0     Today's PHQ-9 Score:   PHQ-9 SCORE 2019   PHQ-9 Total Score -   PHQ-9 Total Score MyChart -   PHQ-9 Total Score 4     Today's JUDAH-7 Score:   JUDAH-7 SCORE 2019   Total Score -   Total Score -   Total Score 0       Problem list and histories reviewed & adjusted, as indicated.  Additional history: as documented.    Patient Active Problem List   Diagnosis     CARDIOVASCULAR SCREENING; LDL GOAL LESS THAN 160     Anxiety     Encounter for IUD insertion     Moderate single current episode of major depressive disorder (H)     Past Surgical History:   Procedure Laterality Date     TONSILLECTOMY        Social History     Tobacco Use     Smoking status: Former Smoker     Last attempt to quit: 2014     Years since quittin.9     Smokeless tobacco: Never Used     Tobacco comment: socially    Substance Use Topics     Alcohol use: Yes     Alcohol/week: 2.0 standard drinks     Types: 2 Standard drinks or equivalent per week     Comment: socailly       Problem (# of  Occurrences) Relation (Name,Age of Onset)    Bipolar Disorder (1) Father (70)    Breast Cancer (2) Paternal Grandmother, Mother (60)    Cancer - colorectal (1) Paternal Grandfather    Diabetes (1) Father    Hypertension (1) Father            Current Outpatient Medications   Medication Sig     acyclovir (ZOVIRAX) 400 MG tablet Take 1 tablet (400 mg) by mouth daily , 1 tab twice daily during outbreak     Ascorbic Acid (VITAMIN C) 500 MG CAPS Take 1 tablet by mouth daily     Ferrous Sulfate (IRON) 325 (65 Fe) MG tablet Take 1 tablet by mouth daily     FLUoxetine (PROZAC) 40 MG capsule Take 2 capsules (80 mg) by mouth daily     ondansetron (ZOFRAN-ODT) 4 MG ODT tab TAKE 1-2 TABLETS BY MOUTH EVERY 8 HOURS AS NEEDED FOR NAUSEA     valACYclovir (VALTREX) 1000 mg tablet Take 1 tablet (1,000 mg) by mouth 2 times daily     levonorgestrel (MIRENA) 20 MCG/24HR IUD 1 each (20 mcg) by Intrauterine route once for 1 dose     UNABLE TO FIND MEDICATION NAME: Z- patch by yazan for sleep     No current facility-administered medications for this visit.      No Known Allergies    ROS:  12 point review of systems negative other than symptoms noted below.    OBJECTIVE:     /64   Wt 76.2 kg (168 lb)   BMI 27.12 kg/m    Body mass index is 27.12 kg/m .    Exam:  Constitutional:  Appearance: Well nourished, well developed alert, in no acute distress  Psychiatric:  Mentation appears normal and affect normal/bright.  Pelvic Exam:  External Genitalia:     Normal appearance for age, no discharge present, no tenderness present, no inflammatory lesions present, color normal- 2mm lesion on right, posterior introitus.   Vagina:    Normal vaginal vault without central or paravaginal defects, no discharge present, no inflammatory lesions present, no masses present  Urethra:   Urethral Meatus:  No erythema or lesions present  Cervix:     Appearance healthy, no lesions present, nontender to palpation, no bleeding present, string present  Perineum:      Perineum within normal limits, no evidence of trauma, no rashes or skin lesions present  Anus:     Anus within normal limits, no hemorrhoids present  Inguinal Lymph Nodes:     No lymphadenopathy present  Pubic Hair:     Normal pubic hair distribution for age  Genitalia and Groin:     No rashes present, no lesions present, no areas of discoloration, no masses present       In-Clinic Test Results:  No results found for this or any previous visit (from the past 24 hour(s)).    ASSESSMENT/PLAN:                                                        ICD-10-CM    1. HSV-1 infection B00.9 valACYclovir (VALTREX) 1000 mg tablet       There are no Patient Instructions on file for this visit.    Active HSV outbreak for 3 weeks. Will change to valtrex for 10 days. Decrease stress. RTC PRN    ALEXANDER Last CNP  [facility]

## 2019-10-22 NOTE — NURSING NOTE
"Chief Complaint   Patient presents with     Follow Up     Herpes       Initial /64   Wt 76.2 kg (168 lb)   BMI 27.12 kg/m   Estimated body mass index is 27.12 kg/m  as calculated from the following:    Height as of 19: 1.676 m (5' 6\").    Weight as of this encounter: 76.2 kg (168 lb).  BP completed using cuff size: regular    Questioned patient about current smoking habits.  Pt. has never smoked.          The following HM Due: NONE      Dayday Edge MA      "

## 2019-11-03 ENCOUNTER — MYC MEDICAL ADVICE (OUTPATIENT)
Dept: OBGYN | Facility: CLINIC | Age: 29
End: 2019-11-03

## 2019-11-04 DIAGNOSIS — B00.9 HSV-1 INFECTION: ICD-10-CM

## 2019-11-04 RX ORDER — VALACYCLOVIR HYDROCHLORIDE 1 G/1
TABLET, FILM COATED ORAL
Qty: 20 TABLET | Refills: 0 | Status: SHIPPED | OUTPATIENT
Start: 2019-11-04 | End: 2020-03-03

## 2019-11-04 NOTE — TELEPHONE ENCOUNTER
If she has improved some I would have her continue with just a 1000mg a day for next week, if persisting may need to be seen again.  CLINT FontenotC

## 2019-11-04 NOTE — TELEPHONE ENCOUNTER
"Requested Prescriptions   Pending Prescriptions Disp Refills     valACYclovir (VALTREX) 1000 mg tablet [Pharmacy Med Name: VALACYCLOVIR HCL 1 GRAM TABLET] 20 tablet 0     Sig: TAKE 1 TABLET BY MOUTH TWICE A DAY       Antivirals for Herpes Protocol Failed - 11/4/2019  4:35 PM        Failed - Normal serum creatinine on file in past 12 months     Recent Labs   Lab Test 10/25/12  1129   CR 0.74             Passed - Patient is age 12 or older        Passed - Recent (12 mo) or future (30 days) visit within the authorizing provider's specialty     Patient has had an office visit with the authorizing provider or a provider within the authorizing providers department within the previous 12 mos or has a future within next 30 days. See \"Patient Info\" tab in inbasket, or \"Choose Columns\" in Meds & Orders section of the refill encounter.              Passed - Medication is active on med list        Prescription approved per [facility] Refill Protocol.  Tana Noe RN on 11/4/2019 at 4:36 PM    "

## 2019-11-04 NOTE — TELEPHONE ENCOUNTER
OV 10/22/19  Active HSV outbreak for 3 weeks. Will change to valtrex for 10 days. Decrease stress. RTC PRN     ALEXANDER Last     Rx valtrex 1000 mg BID x 10 days. Has almost completed rx-outbreak still hasn't cleared. Was previously taking acyclovir 400 mg BID before the valtrex.  Tana Noe RN on 11/4/2019 at 10:15 AM

## 2019-11-09 ENCOUNTER — HEALTH MAINTENANCE LETTER (OUTPATIENT)
Age: 29
End: 2019-11-09

## 2020-01-02 ENCOUNTER — E-VISIT (OUTPATIENT)
Dept: PEDIATRICS | Facility: CLINIC | Age: 30
End: 2020-01-02
Payer: COMMERCIAL

## 2020-01-02 DIAGNOSIS — M25.511 ACUTE PAIN OF RIGHT SHOULDER: Primary | ICD-10-CM

## 2020-01-02 PROCEDURE — 99421 OL DIG E/M SVC 5-10 MIN: CPT | Performed by: PEDIATRICS

## 2020-01-03 RX ORDER — NAPROXEN 500 MG/1
500 TABLET ORAL 2 TIMES DAILY WITH MEALS
Qty: 10 TABLET | Refills: 0 | Status: SHIPPED | OUTPATIENT
Start: 2020-01-03 | End: 2020-04-29

## 2020-02-23 ENCOUNTER — HEALTH MAINTENANCE LETTER (OUTPATIENT)
Age: 30
End: 2020-02-23

## 2020-02-27 NOTE — PROGRESS NOTES
SUBJECTIVE:                                                   Celia Castro is a 29 year old female who presents to clinic today for the following health issue(s):  Continuous Herpes Outbreak x6 months on and off      HPI:  Last Pap 2018 -NIL, due soon    Pt here today with HSV outbreak since last . sheh had been on suppressive thearapy. We increased her to valacyclovir 1,000mg BID it got better, but never went away.     Stress is always there. Doing well with work.     No LMP recorded. (Menstrual status: IUD)..     Patient is sexually active, .  Using IUD for contraception.    reports that she quit smoking about 5 years ago. She has never used smokeless tobacco.    STD testing offered?  Declined    Health maintenance updated:  yes      Problem list and histories reviewed & adjusted, as indicated.  Additional history: as documented.    Patient Active Problem List   Diagnosis     CARDIOVASCULAR SCREENING; LDL GOAL LESS THAN 160     Anxiety     Encounter for IUD insertion     Moderate single current episode of major depressive disorder (H)     Past Surgical History:   Procedure Laterality Date     TONSILLECTOMY        Social History     Tobacco Use     Smoking status: Former Smoker     Last attempt to quit: 2014     Years since quittin.3     Smokeless tobacco: Never Used     Tobacco comment: socially    Substance Use Topics     Alcohol use: Yes     Alcohol/week: 2.0 standard drinks     Types: 2 Standard drinks or equivalent per week     Comment: socailly       Problem (# of Occurrences) Relation (Name,Age of Onset)    Bipolar Disorder (1) Father (70)    Breast Cancer (2) Paternal Grandmother, Mother (60)    Cancer - colorectal (1) Paternal Grandfather    Diabetes (1) Father    Hypertension (1) Father            Current Outpatient Medications   Medication Sig     acyclovir (ZOVIRAX) 400 MG tablet Take 1 tablet (400 mg) by mouth daily , 1 tab twice daily during outbreak     acyclovir  (ZOVIRAX) 5 % external ointment Apply topically 6 times daily     FLUoxetine (PROZAC) 40 MG capsule Take 2 capsules (80 mg) by mouth daily     levonorgestrel (MIRENA) 20 MCG/24HR IUD 1 each (20 mcg) by Intrauterine route once for 1 dose     ondansetron (ZOFRAN-ODT) 4 MG ODT tab TAKE 1-2 TABLETS BY MOUTH EVERY 8 HOURS AS NEEDED FOR NAUSEA     UNABLE TO FIND MEDICATION NAME: Z- patch by yazan for sleep     valACYclovir (VALTREX) 1000 mg tablet Take 1 tablet (1,000 mg) by mouth 3 times daily     Ascorbic Acid (VITAMIN C) 500 MG CAPS Take 1 tablet by mouth daily     Ferrous Sulfate (IRON) 325 (65 Fe) MG tablet Take 1 tablet by mouth daily     No current facility-administered medications for this visit.      No Known Allergies    ROS:  12 point review of systems negative other than symptoms noted below or in the HPI.  Skin: New Skin Lesions  No urinary frequency or dysuria, bladder or kidney problems, genital herpes      OBJECTIVE:     /66 (BP Location: Right arm, Cuff Size: Adult Regular)   Wt 76.7 kg (169 lb)   BMI 27.28 kg/m    Body mass index is 27.28 kg/m .    Exam:  Constitutional:  Appearance: Well nourished, well developed alert, in no acute distress  Psychiatric:  Mentation appears normal and affect normal/bright.  Pelvic Exam:  External Genitalia:     Normal appearance for age, no discharge present, no tenderness present, no inflammatory lesions present, color normal- 3 vesicles noted at introitus  Vagina:     Normal vaginal vault without central or paravaginal defects, no discharge present, no inflammatory lesions present, no masses present  Perineum:     Perineum within normal limits, no evidence of trauma, no rashes or skin lesions present  Anus:     Anus within normal limits, no hemorrhoids present  Inguinal Lymph Nodes:     No lymphadenopathy present  Pubic Hair:     Normal pubic hair distribution for age  Genitalia and Groin:     No rashes present, no lesions present, no areas of discoloration, no  masses present       In-Clinic Test Results:  No results found for this or any previous visit (from the past 24 hour(s)).    ASSESSMENT/PLAN:                                                        ICD-10-CM    1. HSV-1 infection B00.9 valACYclovir (VALTREX) 1000 mg tablet     acyclovir (ZOVIRAX) 5 % external ointment       There are no Patient Instructions on file for this visit.    Will increase valacyclovir to TID and try ointment.     ALEXANDER Last CNP  [facility]

## 2020-03-03 ENCOUNTER — OFFICE VISIT (OUTPATIENT)
Dept: OBGYN | Facility: CLINIC | Age: 30
End: 2020-03-03
Payer: COMMERCIAL

## 2020-03-03 VITALS — WEIGHT: 169 LBS | BODY MASS INDEX: 27.28 KG/M2 | DIASTOLIC BLOOD PRESSURE: 66 MMHG | SYSTOLIC BLOOD PRESSURE: 118 MMHG

## 2020-03-03 DIAGNOSIS — B00.9 HSV-1 INFECTION: ICD-10-CM

## 2020-03-03 PROCEDURE — 99213 OFFICE O/P EST LOW 20 MIN: CPT | Performed by: NURSE PRACTITIONER

## 2020-03-03 RX ORDER — VALACYCLOVIR HYDROCHLORIDE 1 G/1
1000 TABLET, FILM COATED ORAL 3 TIMES DAILY
Qty: 30 TABLET | Refills: 1 | Status: SHIPPED | OUTPATIENT
Start: 2020-03-03 | End: 2020-04-29

## 2020-03-03 RX ORDER — ACYCLOVIR 50 MG/G
OINTMENT TOPICAL
Qty: 15 G | Refills: 3 | Status: SHIPPED | OUTPATIENT
Start: 2020-03-03 | End: 2021-05-19

## 2020-03-18 ENCOUNTER — VIRTUAL VISIT (OUTPATIENT)
Dept: FAMILY MEDICINE | Facility: OTHER | Age: 30
End: 2020-03-18

## 2020-03-18 NOTE — PROGRESS NOTES
"Date: 2020 14:10:57  Clinician: Cassidy Mccoy  Clinician NPI: [id number]  Patient: Celia Castro  Patient : 1990  Patient Address: [address] [address]  Patient Phone: [phone]  Visit Protocol: URI  Patient Summary:  Celia is a 29 year old ( : 1990 ) female who initiated a Visit for cold, sinus infection, or influenza. When asked the question \"Please sign me up to receive news, health information and promotions. \", Celia responded \"No\".    Celia states her symptoms started 1-2 days ago.   Her symptoms consist of a sore throat.   Symptom details   Sore throat: Celia reports having mild throat pain (1-3 on a 10 point pain scale), does not have exudate on her tonsils, and can swallow liquids. She is not sure if the lymph nodes in her neck are enlarged. A rash has not appeared on the skin since the sore throat started.    Celia denies having wheezing, cough, nasal congestion, fever, ear pain, malaise, headache, rhinitis, facial pain or pressure, myalgias, chills, and teeth pain. She also denies taking antibiotic medication for the symptoms and having recent facial or sinus surgery in the past 60 days. She is not experiencing dyspnea.   Precipitating events  Within the past week, Celia has not been exposed to someone with strep throat.   Pertinent COVID-19 (Coronavirus) information  Celia has not traveled internationally or to the areas where COVID-19 (Coronavirus) is widespread in the last 14 days before the start of her symptoms.   Celia has not had a close contact with a laboratory-confirmed COVID-19 patient within 14 days of symptom onset. She also has not had a close contact with a suspected COVID-19 patient within 14 days of symptom onset.   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   Pertinent medical history  Celia does not get yeast infections when she takes antibiotics.   Celia needs a return to work/school note.   Weight: 165 lbs   " Celia does not smoke or use smokeless tobacco.   She denies pregnancy and denies breastfeeding. She does not menstruate.   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 They want us to stay home for 7 days if we are ill at all. I'm looking to be advised as to what to do. Thank you.   Weight: 165 lbs    MEDICATIONS: valacyclovir oral, ondansetron oral, fluoxetine oral, ALLERGIES: NKDA  Clinician Response:  Dear Celia,   Based on the information you have provided, you do have symptoms that are consistent with Coronavirus (COVID-19).  The coronavirus causes mild to severe respiratory illness with the most common symptoms including fever, cough and difficulty breathing. Unfortunately, many viruses cause similar symptoms and it can be difficult to distinguish between viruses, especially in mild cases, so we are presuming that anyone with cough or fever has coronavirus at this time.  Coronavirus/COVID-19 has reached the point of community spread in [place], meaning that we are finding the virus in people with no known exposure risk for cliff the virus. Given the increasing commonness of coronavirus in the community we are no longer testing patients who are not critically ill.  If you are a health care worker, you should refer to your employee health office for instructions about returning to work.  For everyone else who has cough or fever, you should assume you are infected with coronavirus. Accordingly, you should self-quarantine for seven days from the first day your symptoms started OR 72 hours after your cough and fever completely resolve - WHICHEVER is LONGER. You should call if you find increasing shortness of breath, wheezing or sustained fever above 101.5. If you are significantly short of breath or experience chest pain you should call 911 or report to the nearest emergency department for urgent  evaluation.    Isolate yourself at home.   Do Not allow any visitors  Do Not go to work or school  Do Not go to [employer],  centers, shopping, or other public places.  Do Not shake hands.  Avoid close contact with others (hugging, kissing).   Protect Others:    Cover Your Mouth and Nose with a mask, disposable tissue or wash cloth to avoid spreading germs to others.  Wash your hands and face frequently with soap and water.   If you develop significant shortness of breath that prevents you from doing normal activities, please call 911 or proceed to the nearest emergency room and alert them immediately that you have been in self-isolation for possible coronavirus.   For more information about COVID19 and options for caring for yourself at home, please visit the CDC website at https://www.cdc.gov/coronavirus/2019-ncov/about/steps-when-sick.htmlFor more options for care at [facility], please visit our website at [email or web address]   Medication information  Because you have a viral infection, antibiotics will not help you get better. Treating a viral infection with antibiotics could actually make you feel worse.  Unless you are allergic to the over-the-counter medication(s) below, I recommend using:    Acetaminophen (Tylenol or store brand) oral tablet. Take 1-2 tablets by mouth every 4-6 hours to help with the discomfort.   An antihistamine such as Benadryl, Claritin, or store brand.  A decongestant such as Sudafed PE or store brand.  Guaifenesin + dextromethorphan (Robitussin DM, Mucinex DM, or store brand).  Saline nasal spray (Strum or store brand). Use 1-2 sprays in each nostril 3 times a day as needed for congestion.  Oxymetazoline (Afrin or store brand) nasal spray. Use 1 spray in each nostril 2 times a day for a maximum of 3 days. Using this medication more frequently or longer than recommended may cause nasal congestion to reoccur or worsen. Sinus pressure occurs when  the tissues lining your sinuses become swollen and inflamed. Afrin nasal spray decreases the swelling to provide the quickest and most effective relief from sinus pressure.   Over-the-counter medications do not require a prescription. Ask the pharmacist if you have any questions.  Self care  The following tips will keep you as comfortable as possible while you recover:   Rest, Take naps, Avoid vigorous physical activity  Drink plenty of water and other liquids  Use throat lozenges  Gargle with warm salt water (1/4 teaspoon of salt per 8 ounce glass of water)  Suck on frozen items such as popsicles or ice cubes  Drink hot tea with lemon and honey  Take a spoonful of honey to reduce your cough     Diagnosis: Cough  Diagnosis ICD: R05

## 2020-04-15 ENCOUNTER — MYC MEDICAL ADVICE (OUTPATIENT)
Dept: PEDIATRICS | Facility: CLINIC | Age: 30
End: 2020-04-15

## 2020-04-15 ASSESSMENT — PATIENT HEALTH QUESTIONNAIRE - PHQ9
10. IF YOU CHECKED OFF ANY PROBLEMS, HOW DIFFICULT HAVE THESE PROBLEMS MADE IT FOR YOU TO DO YOUR WORK, TAKE CARE OF THINGS AT HOME, OR GET ALONG WITH OTHER PEOPLE: SOMEWHAT DIFFICULT
SUM OF ALL RESPONSES TO PHQ QUESTIONS 1-9: 6
SUM OF ALL RESPONSES TO PHQ QUESTIONS 1-9: 6

## 2020-04-15 NOTE — TELEPHONE ENCOUNTER
MA/TC: please assist pt with scheduling MH Follow up - phone or video visit with PCP in coming weeks.

## 2020-04-29 ENCOUNTER — VIRTUAL VISIT (OUTPATIENT)
Dept: PEDIATRICS | Facility: CLINIC | Age: 30
End: 2020-04-29
Payer: COMMERCIAL

## 2020-04-29 DIAGNOSIS — F32.1 MODERATE SINGLE CURRENT EPISODE OF MAJOR DEPRESSIVE DISORDER (H): ICD-10-CM

## 2020-04-29 DIAGNOSIS — F41.9 ANXIETY: Primary | ICD-10-CM

## 2020-04-29 DIAGNOSIS — R11.0 NAUSEA: ICD-10-CM

## 2020-04-29 PROCEDURE — 99214 OFFICE O/P EST MOD 30 MIN: CPT | Mod: GT | Performed by: PEDIATRICS

## 2020-04-29 RX ORDER — FLUOXETINE 40 MG/1
CAPSULE ORAL
Qty: 180 CAPSULE | Refills: 1 | Status: SHIPPED | OUTPATIENT
Start: 2020-04-29 | End: 2020-10-26

## 2020-04-29 RX ORDER — HYDROXYZINE PAMOATE 50 MG/1
50 CAPSULE ORAL 3 TIMES DAILY PRN
Qty: 90 CAPSULE | Refills: 0 | Status: SHIPPED | OUTPATIENT
Start: 2020-04-29 | End: 2020-06-01

## 2020-04-29 RX ORDER — ONDANSETRON 4 MG/1
TABLET, ORALLY DISINTEGRATING ORAL
Qty: 20 TABLET | Refills: 0 | Status: SHIPPED | OUTPATIENT
Start: 2020-04-29 | End: 2022-08-29

## 2020-04-29 NOTE — PATIENT INSTRUCTIONS
Jimmie Yang,    Nice to see you today.    I have refilled the prozac for another 6 months and also sent in hydroxyzine which is an as needed anxiety medication.    I will have someone reach out to you to get a follow up appt scheduled in 6 months.    Take care!    Rissa Serrano MD  Internal Medicine/Pediatrics  [facility] [place]

## 2020-04-29 NOTE — PROGRESS NOTES
"Celia Castro is a 29 year old female who is being evaluated via a billable video visit.      The patient has been notified of following:     \"This video visit will be conducted via a call between you and your physician/provider. We have found that certain health care needs can be provided without the need for an in-person physical exam.  This service lets us provide the care you need with a video conversation.  If a prescription is necessary we can send it directly to your pharmacy.  If lab work is needed we can place an order for that and you can then stop by our lab to have the test done at a later time.    If during the course of the call the physician/provider feels a video visit is not appropriate, you will not be charged for this service.\"     Patient has given verbal consent for Video visit? Yes    Patient would like the video invitation sent by: Text to cell phone: [phone]        Subjective     Celia Castro is a 29 year old female who presents to clinic today for the following health issues:    HPI   Pt is just needing to recheck medications.     Patient is on prozac 80mg daily for depression and anxiety.      Still going to work and just closed on a house - will be moving soon.  Doing ok, but getting a few more panic attacks.  She states she used to have a prescription for xanax, but doesn't have any right now.  Has never tried hydroxyzine.        Video Start Time: 1006am              Reviewed and updated as needed this visit by Provider  Meds         Review of Systems   ROS COMP: Constitutional, HEENT, cardiovascular, pulmonary, gi and gu systems are negative, except as otherwise noted.      Objective    There were no vitals taken for this visit.  Estimated body mass index is 27.28 kg/m  as calculated from the following:    Height as of 9/13/19: 1.676 m (5' 6\").    Weight as of 3/3/20: 76.7 kg (169 lb).  Physical Exam   GENERAL: healthy, alert and no distress    Diagnostic Test Results:  Labs reviewed in " "Epic        Assessment & Plan     1. Moderate single current episode of major depressive disorder (H)  Stable, follow up in 6m   - FLUoxetine (PROZAC) 40 MG capsule; TAKE 2 CAPSULES BY MOUTH EVERY DAY  Dispense: 180 capsule; Refill: 1    2. NauseaUses occ when anxiety gets very back  - ondansetron (ZOFRAN-ODT) 4 MG ODT tab; TAKE 1-2 TABLETS BY MOUTH EVERY 8 HOURS AS NEEDED FOR NAUSEA  Dispense: 20 tablet; Refill: 0    3. Anxiety  Uncontrolled with increase panic attacks - will try hydroxzyzine instead of xanax  - hydrOXYzine (VISTARIL) 50 MG capsule; Take 1 capsule (50 mg) by mouth 3 times daily as needed for anxiety  Dispense: 90 capsule; Refill: 0     BMI:   Estimated body mass index is 27.28 kg/m  as calculated from the following:    Height as of 9/13/19: 1.676 m (5' 6\").    Weight as of 3/3/20: 76.7 kg (169 lb).           Patient Instructions   Hi Bird Yang to see you today.    I have refilled the prozac for another 6 months and also sent in hydroxyzine which is an as needed anxiety medication.    I will have someone reach out to you to get a follow up appt scheduled in 6 months.    Take care!    Rissa Serrano MD  Internal Medicine/Pediatrics  [facility]          Return in about 6 months (around 10/29/2020) for Follow up Office Visit.    Rissa Serrano MD  [facility]      Video-Visit Details    Type of service:  Video Visit    Video End Time (time video stopped): 1016am    Originating Location (pt. Location): Home    Distant Location (provider location):  [facility]     Mode of Communication:  Video Conference via [employer]    Return in about 6 months (around 10/29/2020) for Follow up Office Visit.       Rissa Serrano MD      "

## 2020-05-30 DIAGNOSIS — B00.9 HSV-2 INFECTION: ICD-10-CM

## 2020-05-30 DIAGNOSIS — F41.9 ANXIETY: ICD-10-CM

## 2020-05-31 RX ORDER — ACYCLOVIR 400 MG/1
400 TABLET ORAL DAILY
Qty: 60 TABLET | Refills: 1 | Status: SHIPPED | OUTPATIENT
Start: 2020-05-31 | End: 2021-01-04

## 2020-05-31 NOTE — TELEPHONE ENCOUNTER
"Requested Prescriptions   Pending Prescriptions Disp Refills     acyclovir (ZOVIRAX) 400 MG tablet [Pharmacy Med Name: ACYCLOVIR 400 MG TABLET] 60 tablet 4     Sig: TAKE 1 TABLET (400 MG) BY MOUTH DAILY , 1 TAB TWICE DAILY DURING OUTBREAK       Antivirals for Herpes Protocol Failed - 5/30/2020 12:14 AM        Failed - Normal serum creatinine on file in past 12 months     Recent Labs   Lab Test 10/25/12  1129   CR 0.74       Ok to refill medication if creatinine is low          Passed - Patient is age 12 or older        Passed - Recent (12 mo) or future (30 days) visit within the authorizing provider's specialty     Patient has had an office visit with the authorizing provider or a provider within the authorizing providers department within the previous 12 mos or has a future within next 30 days. See \"Patient Info\" tab in inbasket, or \"Choose Columns\" in Meds & Orders section of the refill encounter.              Passed - Medication is active on med list           Last Written Prescription Date:  9/13/19  Last Fill Quantity: 90,  # refills: 3   Last office visit: 3/3/2020 with prescribing provider:  PEACE Valadez NP   Future Office Visit:    Prescription approved per [facility] Refill Protocol.  Rema Beck RN on 5/31/2020 at 1:59 PM          "

## 2020-06-01 RX ORDER — HYDROXYZINE PAMOATE 50 MG/1
CAPSULE ORAL
Qty: 90 CAPSULE | Refills: 0 | Status: SHIPPED | OUTPATIENT
Start: 2020-06-01 | End: 2021-05-19

## 2020-06-01 NOTE — TELEPHONE ENCOUNTER
Routing refill request to provider for review/approval because:  Please review dosing and quantity needs.    Charlene Grey RN on 6/1/2020 at 8:47 AM

## 2020-08-15 ENCOUNTER — E-VISIT (OUTPATIENT)
Dept: OBGYN | Facility: CLINIC | Age: 30
End: 2020-08-15
Payer: COMMERCIAL

## 2020-08-15 DIAGNOSIS — B37.31 CANDIDAL VULVOVAGINITIS: Primary | ICD-10-CM

## 2020-08-15 PROCEDURE — 99207 ZZC NO BILLABLE SERVICE THIS VISIT: CPT | Performed by: NURSE PRACTITIONER

## 2020-08-18 RX ORDER — FLUCONAZOLE 150 MG/1
150 TABLET ORAL ONCE
Qty: 1 TABLET | Refills: 0 | Status: SHIPPED | OUTPATIENT
Start: 2020-08-18 | End: 2020-08-18

## 2020-10-26 ENCOUNTER — VIRTUAL VISIT (OUTPATIENT)
Dept: PEDIATRICS | Facility: CLINIC | Age: 30
End: 2020-10-26
Payer: COMMERCIAL

## 2020-10-26 ENCOUNTER — TELEPHONE (OUTPATIENT)
Dept: PEDIATRICS | Facility: CLINIC | Age: 30
End: 2020-10-26

## 2020-10-26 DIAGNOSIS — F32.1 MODERATE SINGLE CURRENT EPISODE OF MAJOR DEPRESSIVE DISORDER (H): ICD-10-CM

## 2020-10-26 DIAGNOSIS — F41.9 ANXIETY: Primary | ICD-10-CM

## 2020-10-26 PROCEDURE — 99214 OFFICE O/P EST MOD 30 MIN: CPT | Mod: 95 | Performed by: PEDIATRICS

## 2020-10-26 RX ORDER — BUSPIRONE HYDROCHLORIDE 5 MG/1
5 TABLET ORAL 2 TIMES DAILY
Qty: 180 TABLET | Refills: 0 | Status: SHIPPED | OUTPATIENT
Start: 2020-10-26 | End: 2020-12-21

## 2020-10-26 RX ORDER — FLUOXETINE 40 MG/1
CAPSULE ORAL
Qty: 180 CAPSULE | Refills: 1 | Status: SHIPPED | OUTPATIENT
Start: 2020-10-26 | End: 2021-05-19

## 2020-10-26 ASSESSMENT — ANXIETY QUESTIONNAIRES
1. FEELING NERVOUS, ANXIOUS, OR ON EDGE: SEVERAL DAYS
7. FEELING AFRAID AS IF SOMETHING AWFUL MIGHT HAPPEN: NOT AT ALL
2. NOT BEING ABLE TO STOP OR CONTROL WORRYING: SEVERAL DAYS
3. WORRYING TOO MUCH ABOUT DIFFERENT THINGS: SEVERAL DAYS
GAD7 TOTAL SCORE: 5
IF YOU CHECKED OFF ANY PROBLEMS ON THIS QUESTIONNAIRE, HOW DIFFICULT HAVE THESE PROBLEMS MADE IT FOR YOU TO DO YOUR WORK, TAKE CARE OF THINGS AT HOME, OR GET ALONG WITH OTHER PEOPLE: SOMEWHAT DIFFICULT
5. BEING SO RESTLESS THAT IT IS HARD TO SIT STILL: NOT AT ALL
6. BECOMING EASILY ANNOYED OR IRRITABLE: SEVERAL DAYS

## 2020-10-26 ASSESSMENT — PATIENT HEALTH QUESTIONNAIRE - PHQ9
SUM OF ALL RESPONSES TO PHQ QUESTIONS 1-9: 11
5. POOR APPETITE OR OVEREATING: SEVERAL DAYS

## 2020-10-26 NOTE — PATIENT INSTRUCTIONS
Jimmie Yang,    It was nice to see you today.    Let's try adding buspar to your fluoxetine.  I have sent this to your pharmacy.  We will follow up in about 4 weeks with another video call.    Take care until then!    Rissa Serrano MD  Internal Medicine/Pediatrics  [facility]

## 2020-10-26 NOTE — TELEPHONE ENCOUNTER
[facility] called an scheduled depression/anxiety follow up for 11/16/2020 at 1620.    Percy Roche at 11:19 AM on 10/26/2020  [facility] Health Guide  Phone [phone]

## 2020-10-26 NOTE — PROGRESS NOTES
"Celia Castro is a 29 year old female who is being evaluated via a billable video visit.      The patient has been notified of following:     \"This video visit will be conducted via a call between you and your physician/provider. We have found that certain health care needs can be provided without the need for an in-person physical exam.  This service lets us provide the care you need with a video conversation.  If a prescription is necessary we can send it directly to your pharmacy.  If lab work is needed we can place an order for that and you can then stop by our lab to have the test done at a later time.    Video visits are billed at different rates depending on your insurance coverage.  Please reach out to your insurance provider with any questions.    If during the course of the call the physician/provider feels a video visit is not appropriate, you will not be charged for this service.\"    Patient has given verbal consent for Video visit? Yes  How would you like to obtain your AVS? MyChart  If you are dropped from the video visit, the video invite should be resent to: Text to cell phone: [phone]  Will anyone else be joining your video visit? No    Subjective     Celia Castro is a 29 year old female who presents today via video visit for the following health issues:    HPI     Depression and Anxiety Follow-Up    How are you doing with your depression since your last visit? Worsened     How are you doing with your anxiety since your last visit?  Worsened     Are you having other symptoms that might be associated with depression or anxiety? No    Have you had a significant life event? OTHER: bought a house     Do you have any concerns with your use of alcohol or other drugs? No     Last visit in APril 2020 - on prozac 80mg- added hydroxyzine for panic attacks. Got better for a while.  More days with anxiety.  Hydroxyzine.    Has been on previous medications: paxil (weight gain), trazodone, wellbutrin.  Previous " "sleep study - weaned off trazodone.  Was working with a sleep specialist. Currently sleeping \"terribly\" - helped her fall asleep/quality.     Has new house, moved in w/ boyfriend.  Lots of good things happening. Feels fatigued from the pandemic. Work is ok - working on site.     Social History     Tobacco Use     Smoking status: Former Smoker     Quit date: 2014     Years since quittin.9     Smokeless tobacco: Never Used     Tobacco comment: socially    Substance Use Topics     Alcohol use: Yes     Alcohol/week: 2.0 standard drinks     Types: 2 Standard drinks or equivalent per week     Comment: socailly      Drug use: No     PHQ 2019 4/15/2020 10/26/2020   PHQ-9 Total Score 4 6 11   Q9: Thoughts of better off dead/self-harm past 2 weeks Not at all Not at all Not at all     JUDAH-7 SCORE 2019 2019 10/26/2020   Total Score - - -   Total Score 1 (minimal anxiety) - -   Total Score 1 0 5     Last PHQ-9 10/26/2020   1.  Little interest or pleasure in doing things 2   2.  Feeling down, depressed, or hopeless 2   3.  Trouble falling or staying asleep, or sleeping too much 3   4.  Feeling tired or having little energy 3   5.  Poor appetite or overeating 0   6.  Feeling bad about yourself 0   7.  Trouble concentrating 1   8.  Moving slowly or restless 0   Q9: Thoughts of better off dead/self-harm past 2 weeks 0   PHQ-9 Total Score 11   Difficulty at work, home, or with people Somewhat difficult     JUDAH-7  10/26/2020   1. Feeling nervous, anxious, or on edge 1   2. Not being able to stop or control worrying 1   3. Worrying too much about different things 1   4. Trouble relaxing 1   5. Being so restless that it is hard to sit still 0   6. Becoming easily annoyed or irritable 1   7. Feeling afraid, as if something awful might happen 0   JUDAH-7 Total Score 5   If you checked any problems, how difficult have they made it for you to do your work, take care of things at home, or get along with other people? " Somewhat difficult       Suicide Assessment Five-step Evaluation and Treatment (SAFE-T)      How many servings of fruits and vegetables do you eat daily?  2-3    On average, how many sweetened beverages do you drink each day (Examples: soda, juice, sweet tea, etc.  Do NOT count diet or artificially sweetened beverages)?   2    How many days per week do you exercise enough to make your heart beat faster? 3 or less    How many minutes a day do you exercise enough to make your heart beat faster? 30 - 60  How many days per week do you miss taking your medication? 1    What makes it hard for you to take your medications?  remembering to take         Video Start Time: 10:20am    Review of Systems   Constitutional, HEENT, cardiovascular, pulmonary, gi and gu systems are negative, except as otherwise noted.      Objective           Vitals:  No vitals were obtained today due to virtual visit.    Physical Exam     GENERAL: Healthy, alert and no distress  EYES: Eyes grossly normal to inspection.  No discharge or erythema, or obvious scleral/conjunctival abnormalities.  RESP: No audible wheeze, cough, or visible cyanosis.  No visible retractions or increased work of breathing.    SKIN: Visible skin clear. No significant rash, abnormal pigmentation or lesions.  NEURO: Cranial nerves grossly intact.  Mentation and speech appropriate for age.  PSYCH: Mentation appears normal, affect normal/bright, judgement and insight intact, normal speech and appearance well-groomed.              Assessment & Plan     Moderate single current episode of major depressive disorder (H)  Uncontrolled - more anxiety days, hydroxyzine not helping. Feels a lot of COVID fatigue.  Discussed switching to SNRI vs add on therapy with buspar.  Will try buspar for now with close follow up in 4 weeks.  - FLUoxetine (PROZAC) 40 MG capsule; TAKE 2 CAPSULES BY MOUTH EVERY DAY    Anxiety  As above  - busPIRone (BUSPAR) 5 MG tablet; Take 1 tablet (5 mg) by mouth 2  times daily            Return in about 4 weeks (around 11/23/2020) for Follow up, with me, using a video visit for anxiety/depression.    Rissa Serrano MD  [facility]      Video-Visit Details    Type of service:  Video Visit    Video End Time:10:31am    Originating Location (pt. Location): Home    Distant Location (provider location):  [facility]     Platform used for Video Visit: [employer]

## 2020-10-27 ASSESSMENT — ANXIETY QUESTIONNAIRES: GAD7 TOTAL SCORE: 5

## 2020-11-16 ENCOUNTER — TELEPHONE (OUTPATIENT)
Dept: PEDIATRICS | Facility: CLINIC | Age: 30
End: 2020-11-16

## 2020-11-16 ENCOUNTER — VIRTUAL VISIT (OUTPATIENT)
Dept: PEDIATRICS | Facility: CLINIC | Age: 30
End: 2020-11-16
Payer: COMMERCIAL

## 2020-11-16 DIAGNOSIS — F41.9 ANXIETY: Primary | ICD-10-CM

## 2020-11-16 PROCEDURE — 99214 OFFICE O/P EST MOD 30 MIN: CPT | Mod: 95 | Performed by: PEDIATRICS

## 2020-11-16 RX ORDER — BUSPIRONE HYDROCHLORIDE 5 MG/1
10 TABLET ORAL 2 TIMES DAILY
Qty: 360 TABLET | Refills: 0 | Status: SHIPPED | OUTPATIENT
Start: 2020-11-16 | End: 2020-12-21

## 2020-11-16 NOTE — PROGRESS NOTES
"Celia Castro is a 29 year old female who is being evaluated via a billable video visit.      The patient has been notified of following:     \"This video visit will be conducted via a call between you and your physician/provider. We have found that certain health care needs can be provided without the need for an in-person physical exam.  This service lets us provide the care you need with a video conversation.  If a prescription is necessary we can send it directly to your pharmacy.  If lab work is needed we can place an order for that and you can then stop by our lab to have the test done at a later time.    Video visits are billed at different rates depending on your insurance coverage.  Please reach out to your insurance provider with any questions.    If during the course of the call the physician/provider feels a video visit is not appropriate, you will not be charged for this service.\"    Patient has given verbal consent for Video visit? Yes  How would you like to obtain your AVS? MyChart  If you are dropped from the video visit, the video invite should be resent to: Text to cell phone: [phone]  Will anyone else be joining your video visit? No    Subjective     Celia Castro is a 29 year old female who presents today via video visit for the following health issues:    HPI     Depression and Anxiety Follow-Up    How are you doing with your depression since your last visit? No change    How are you doing with your anxiety since your last visit?  No change    Are you having other symptoms that might be associated with depression or anxiety? Yes:  sleep issues    Have you had a significant life event? No     Do you have any concerns with your use of alcohol or other drugs? No    Last visit about 4 weeks ago - added on buspar to prozac.    About the same - taking buspar 5mg twice daily.          Social History     Tobacco Use     Smoking status: Former Smoker     Quit date: 2014     Years since quittin.0 "     Smokeless tobacco: Never Used     Tobacco comment: socially    Substance Use Topics     Alcohol use: Yes     Alcohol/week: 2.0 standard drinks     Types: 2 Standard drinks or equivalent per week     Comment: socailly      Drug use: No     PHQ 9/13/2019 4/15/2020 10/26/2020   PHQ-9 Total Score 4 6 11   Q9: Thoughts of better off dead/self-harm past 2 weeks Not at all Not at all Not at all     JUDAH-7 SCORE 8/12/2019 9/13/2019 10/26/2020   Total Score - - -   Total Score 1 (minimal anxiety) - -   Total Score 1 0 5     Last PHQ-9 10/26/2020   1.  Little interest or pleasure in doing things 2   2.  Feeling down, depressed, or hopeless 2   3.  Trouble falling or staying asleep, or sleeping too much 3   4.  Feeling tired or having little energy 3   5.  Poor appetite or overeating 0   6.  Feeling bad about yourself 0   7.  Trouble concentrating 1   8.  Moving slowly or restless 0   Q9: Thoughts of better off dead/self-harm past 2 weeks 0   PHQ-9 Total Score 11   Difficulty at work, home, or with people Somewhat difficult     JUDAH-7  10/26/2020   1. Feeling nervous, anxious, or on edge 1   2. Not being able to stop or control worrying 1   3. Worrying too much about different things 1   4. Trouble relaxing 1   5. Being so restless that it is hard to sit still 0   6. Becoming easily annoyed or irritable 1   7. Feeling afraid, as if something awful might happen 0   JUDAH-7 Total Score 5   If you checked any problems, how difficult have they made it for you to do your work, take care of things at home, or get along with other people? Somewhat difficult       Suicide Assessment Five-step Evaluation and Treatment (SAFE-T)      How many servings of fruits and vegetables do you eat daily?  2-3    On average, how many sweetened beverages do you drink each day (Examples: soda, juice, sweet tea, etc.  Do NOT count diet or artificially sweetened beverages)?   2    How many days per week do you exercise enough to make your heart beat  faster? 3 or less    How many minutes a day do you exercise enough to make your heart beat faster? 30 - 60  How many days per week do you miss taking your medication? 1    What makes it hard for you to take your medications?  remembering to take       Video Start Time: 2:07pm    Review of Systems   Constitutional, HEENT, cardiovascular, pulmonary, gi and gu systems are negative, except as otherwise noted.      Objective           Vitals:  No vitals were obtained today due to virtual visit.    Physical Exam     GENERAL: Healthy, alert and no distress  EYES: Eyes grossly normal to inspection.  No discharge or erythema, or obvious scleral/conjunctival abnormalities.  RESP: No audible wheeze, cough, or visible cyanosis.  No visible retractions or increased work of breathing.    SKIN: Visible skin clear. No significant rash, abnormal pigmentation or lesions.  NEURO: Cranial nerves grossly intact.  Mentation and speech appropriate for age.  PSYCH: Mentation appears normal, affect normal/bright, judgement and insight intact, normal speech and appearance well-groomed.          Assessment & Plan     Anxiety  Not improved, will try titrating further up on dose - see PI  - busPIRone (BUSPAR) 5 MG tablet; Take 2 tablets (10 mg) by mouth 2 times daily        Patient Instructions   Jimmie Yang,    Here is schedule for increasing the buspar:    Increase to 10mg in AM and 5mg in PM  X 3 days  Increase to 10mg twice daily x 2 weeks.    If feeling that you are starting to improve, but now where you want to be:    Increase to 15mg in AM and 10mg in PM x 3 days  Increase to 15mg twice daily    Then follow up with me in about 6 weeks.      Return in about 6 weeks (around 12/28/2020) for Follow up, with me, using a video visit, Depression/Anxiety.    Rissa Serrano MD  [facility]    Video-Visit Details    Type of service:  Video Visit    Video End Time:2:13pm    Originating Location (pt. Location): Home    Distant  Location (provider location):  [facility]     Platform used for Video Visit: [employer]

## 2020-11-16 NOTE — TELEPHONE ENCOUNTER
CHG call to assist with scheduling depression/anxiety follow up. Video appointment scheduled for 12/28/2020 at 1420.    Closing encounter.    Percy Roche at 2:37 PM on 11/16/2020  [facility] Health Guide  Phone [phone]

## 2020-11-16 NOTE — PATIENT INSTRUCTIONS
Jimmie Yang,    Here is schedule for increasing the buspar:    Increase to 10mg in AM and 5mg in PM  X 3 days  Increase to 10mg twice daily x 2 weeks.    If feeling that you are starting to improve, but now where you want to be:    Increase to 15mg in AM and 10mg in PM x 3 days  Increase to 15mg twice daily    Then follow up with me in about 6 weeks.

## 2020-11-23 ENCOUNTER — E-VISIT (OUTPATIENT)
Dept: PEDIATRICS | Facility: CLINIC | Age: 30
End: 2020-11-23
Payer: COMMERCIAL

## 2020-11-23 DIAGNOSIS — F32.1 MODERATE SINGLE CURRENT EPISODE OF MAJOR DEPRESSIVE DISORDER (H): Primary | ICD-10-CM

## 2020-11-23 PROCEDURE — 99421 OL DIG E/M SVC 5-10 MIN: CPT | Performed by: PEDIATRICS

## 2020-11-23 ASSESSMENT — ANXIETY QUESTIONNAIRES
GAD7 TOTAL SCORE: 7
2. NOT BEING ABLE TO STOP OR CONTROL WORRYING: SEVERAL DAYS
GAD7 TOTAL SCORE: 7
5. BEING SO RESTLESS THAT IT IS HARD TO SIT STILL: SEVERAL DAYS
4. TROUBLE RELAXING: SEVERAL DAYS
6. BECOMING EASILY ANNOYED OR IRRITABLE: SEVERAL DAYS
7. FEELING AFRAID AS IF SOMETHING AWFUL MIGHT HAPPEN: NOT AT ALL
GAD7 TOTAL SCORE: 7
3. WORRYING TOO MUCH ABOUT DIFFERENT THINGS: NOT AT ALL
7. FEELING AFRAID AS IF SOMETHING AWFUL MIGHT HAPPEN: NOT AT ALL
1. FEELING NERVOUS, ANXIOUS, OR ON EDGE: NEARLY EVERY DAY

## 2020-11-23 ASSESSMENT — PATIENT HEALTH QUESTIONNAIRE - PHQ9
SUM OF ALL RESPONSES TO PHQ QUESTIONS 1-9: 8
10. IF YOU CHECKED OFF ANY PROBLEMS, HOW DIFFICULT HAVE THESE PROBLEMS MADE IT FOR YOU TO DO YOUR WORK, TAKE CARE OF THINGS AT HOME, OR GET ALONG WITH OTHER PEOPLE: SOMEWHAT DIFFICULT
SUM OF ALL RESPONSES TO PHQ QUESTIONS 1-9: 8

## 2020-11-24 ASSESSMENT — ANXIETY QUESTIONNAIRES: GAD7 TOTAL SCORE: 7

## 2020-11-24 ASSESSMENT — PATIENT HEALTH QUESTIONNAIRE - PHQ9: SUM OF ALL RESPONSES TO PHQ QUESTIONS 1-9: 8

## 2020-12-06 ENCOUNTER — HEALTH MAINTENANCE LETTER (OUTPATIENT)
Age: 30
End: 2020-12-06

## 2020-12-11 ENCOUNTER — E-VISIT (OUTPATIENT)
Dept: PEDIATRICS | Facility: CLINIC | Age: 30
End: 2020-12-11
Payer: COMMERCIAL

## 2020-12-11 DIAGNOSIS — J02.9 SORE THROAT: ICD-10-CM

## 2020-12-11 DIAGNOSIS — Z20.822 SUSPECTED COVID-19 VIRUS INFECTION: ICD-10-CM

## 2020-12-11 PROCEDURE — 99421 OL DIG E/M SVC 5-10 MIN: CPT | Performed by: PHYSICIAN ASSISTANT

## 2020-12-12 DIAGNOSIS — J02.9 SORE THROAT: ICD-10-CM

## 2020-12-12 DIAGNOSIS — Z20.822 SUSPECTED COVID-19 VIRUS INFECTION: ICD-10-CM

## 2020-12-12 LAB
DEPRECATED S PYO AG THROAT QL EIA: NEGATIVE
SPECIMEN SOURCE: NORMAL
SPECIMEN SOURCE: NORMAL
STREP GROUP A PCR: NOT DETECTED

## 2020-12-12 PROCEDURE — U0003 INFECTIOUS AGENT DETECTION BY NUCLEIC ACID (DNA OR RNA); SEVERE ACUTE RESPIRATORY SYNDROME CORONAVIRUS 2 (SARS-COV-2) (CORONAVIRUS DISEASE [COVID-19]), AMPLIFIED PROBE TECHNIQUE, MAKING USE OF HIGH THROUGHPUT TECHNOLOGIES AS DESCRIBED BY CMS-2020-01-R: HCPCS | Performed by: PHYSICIAN ASSISTANT

## 2020-12-12 PROCEDURE — 87651 STREP A DNA AMP PROBE: CPT | Performed by: PHYSICIAN ASSISTANT

## 2020-12-12 PROCEDURE — 99N1174 PR STATISTIC STREP A RAPID: Performed by: PHYSICIAN ASSISTANT

## 2020-12-13 LAB
SARS-COV-2 RNA SPEC QL NAA+PROBE: NOT DETECTED
SPECIMEN SOURCE: NORMAL

## 2020-12-24 RX ORDER — BUPROPION HYDROCHLORIDE 150 MG/1
150 TABLET ORAL EVERY MORNING
Qty: 90 TABLET | Refills: 0 | Status: SHIPPED | OUTPATIENT
Start: 2020-12-24 | End: 2021-05-19

## 2020-12-28 ENCOUNTER — VIRTUAL VISIT (OUTPATIENT)
Dept: PEDIATRICS | Facility: CLINIC | Age: 30
End: 2020-12-28
Payer: COMMERCIAL

## 2020-12-28 ENCOUNTER — TELEPHONE (OUTPATIENT)
Dept: PEDIATRICS | Facility: CLINIC | Age: 30
End: 2020-12-28

## 2020-12-28 DIAGNOSIS — F41.9 ANXIETY: Primary | ICD-10-CM

## 2020-12-28 DIAGNOSIS — F32.1 MODERATE SINGLE CURRENT EPISODE OF MAJOR DEPRESSIVE DISORDER (H): ICD-10-CM

## 2020-12-28 PROCEDURE — 99213 OFFICE O/P EST LOW 20 MIN: CPT | Mod: 95 | Performed by: PEDIATRICS

## 2020-12-28 ASSESSMENT — ANXIETY QUESTIONNAIRES
IF YOU CHECKED OFF ANY PROBLEMS ON THIS QUESTIONNAIRE, HOW DIFFICULT HAVE THESE PROBLEMS MADE IT FOR YOU TO DO YOUR WORK, TAKE CARE OF THINGS AT HOME, OR GET ALONG WITH OTHER PEOPLE: SOMEWHAT DIFFICULT
1. FEELING NERVOUS, ANXIOUS, OR ON EDGE: MORE THAN HALF THE DAYS
GAD7 TOTAL SCORE: 6
3. WORRYING TOO MUCH ABOUT DIFFERENT THINGS: SEVERAL DAYS
2. NOT BEING ABLE TO STOP OR CONTROL WORRYING: SEVERAL DAYS
7. FEELING AFRAID AS IF SOMETHING AWFUL MIGHT HAPPEN: NOT AT ALL
6. BECOMING EASILY ANNOYED OR IRRITABLE: SEVERAL DAYS
5. BEING SO RESTLESS THAT IT IS HARD TO SIT STILL: SEVERAL DAYS

## 2020-12-28 ASSESSMENT — PATIENT HEALTH QUESTIONNAIRE - PHQ9
5. POOR APPETITE OR OVEREATING: NOT AT ALL
SUM OF ALL RESPONSES TO PHQ QUESTIONS 1-9: 11

## 2020-12-28 NOTE — PROGRESS NOTES
"Celia Castro is a 30 year old female who is being evaluated via a billable video visit.      The patient has been notified of following:     \"This video visit will be conducted via a call between you and your physician/provider. We have found that certain health care needs can be provided without the need for an in-person physical exam.  This service lets us provide the care you need with a video conversation.  If a prescription is necessary we can send it directly to your pharmacy.  If lab work is needed we can place an order for that and you can then stop by our lab to have the test done at a later time.    Video visits are billed at different rates depending on your insurance coverage.  Please reach out to your insurance provider with any questions.    If during the course of the call the physician/provider feels a video visit is not appropriate, you will not be charged for this service.\"    Patient has given verbal consent for Video visit? Yes  How would you like to obtain your AVS? MyChart  If you are dropped from the video visit, the video invite should be resent to: Text to cell phone: [phone]  Will anyone else be joining your video visit? No    Subjective     Celia Castro is a 30 year old female who presents today via video visit for the following health issues:    HPI     Depression and Anxiety Follow-Up    How are you doing with your depression since your last visit? No change    How are you doing with your anxiety since your last visit?  Worsened     Are you having other symptoms that might be associated with depression or anxiety? No    Have you had a significant life event? No     Do you have any concerns with your use of alcohol or other drugs? No     Mic Argueta Stimulator - wondering about headband - stimulate band to make seratonin.     She hasn't started Wellbutrin yet.     Paroxitine - side effects  Buspar - did not help and side effects  Wellbutrin - unsure if it was by itself or with " something else  Xanax - for prn use  Effexor - unsure (long time ago)    Social History     Tobacco Use     Smoking status: Former Smoker     Quit date: 2014     Years since quittin.1     Smokeless tobacco: Never Used     Tobacco comment: socially    Substance Use Topics     Alcohol use: Yes     Alcohol/week: 2.0 standard drinks     Types: 2 Standard drinks or equivalent per week     Comment: socailly      Drug use: No     PHQ 10/26/2020 2020 2020   PHQ-9 Total Score 11 8 11   Q9: Thoughts of better off dead/self-harm past 2 weeks Not at all Not at all Not at all     JUDAH-7 SCORE 10/26/2020 2020 2020   Total Score - - -   Total Score - 7 (mild anxiety) -   Total Score 5 7 6     Last PHQ-9 2020   1.  Little interest or pleasure in doing things 2   2.  Feeling down, depressed, or hopeless 2   3.  Trouble falling or staying asleep, or sleeping too much 3   4.  Feeling tired or having little energy 3   5.  Poor appetite or overeating 0   6.  Feeling bad about yourself 0   7.  Trouble concentrating 1   8.  Moving slowly or restless 0   Q9: Thoughts of better off dead/self-harm past 2 weeks 0   PHQ-9 Total Score 11   Difficulty at work, home, or with people Somewhat difficult     JUDAH-7  2020   1. Feeling nervous, anxious, or on edge 2   2. Not being able to stop or control worrying 1   3. Worrying too much about different things 1   4. Trouble relaxing 0   5. Being so restless that it is hard to sit still 1   6. Becoming easily annoyed or irritable 1   7. Feeling afraid, as if something awful might happen 0   JUDAH-7 Total Score 6   If you checked any problems, how difficult have they made it for you to do your work, take care of things at home, or get along with other people? Somewhat difficult       Suicide Assessment Five-step Evaluation and Treatment (SAFE-T)      How many servings of fruits and vegetables do you eat daily?  2-3    On average, how many sweetened beverages do  you drink each day (Examples: soda, juice, sweet tea, etc.  Do NOT count diet or artificially sweetened beverages)?   2    How many days per week do you exercise enough to make your heart beat faster? 3 or less    How many minutes a day do you exercise enough to make your heart beat faster? 9 or less    How many days per week do you miss taking your medication? 0         Video Start Time: 2:23pm      Review of Systems   Constitutional, HEENT, cardiovascular, pulmonary, gi and gu systems are negative, except as otherwise noted.       Objective           Vitals:  No vitals were obtained today due to virtual visit.    Physical Exam     GENERAL: Healthy, alert and no distress  EYES: Eyes grossly normal to inspection.  No discharge or erythema, or obvious scleral/conjunctival abnormalities.  RESP: No audible wheeze, cough, or visible cyanosis.  No visible retractions or increased work of breathing.    SKIN: Visible skin clear. No significant rash, abnormal pigmentation or lesions.  NEURO: Cranial nerves grossly intact.  Mentation and speech appropriate for age.  PSYCH: Mentation appears normal, affect normal/bright, judgement and insight intact, normal speech and appearance well-groomed.          Assessment & Plan     Anxiety  Uncontrolled - discussed starting wellbutrin to augment therapy for depression and anxiety - did discuss in previous evisit that there is a chance this may increase anxiety.  She will follow up virtually 6 weeks after starting this, sooner if there are problems.  If this doesn't help, would consider SNRI and/or psychaitry referral.    Moderate single current episode of major depressive disorder (H)  See above          Patient Instructions   Jimmie Yang,    It was nice to see you today. Please do an evisit with me about 6 weeks after you start the Wellbutrin.    Also, I will have the clinic contact you to get a physical for April with a PAP.    Rissa Serrano MD  Internal Medicine/Pediatrics  [facility]              Return in about 4 months (around 4/28/2021) for Routine preventive, with me, in person.    Rissa Serrano MD  [facility]      Video-Visit Details    Type of service:  Video Visit    Video End Time:2:31pm    Originating Location (pt. Location): Home    Distant Location (provider location):  [facility]     Platform used for Video Visit: [employer]

## 2020-12-28 NOTE — TELEPHONE ENCOUNTER
CHG call to assist with scheduling physical plus. No answer, LVM asking to call back on CHG direct extension.     If patient calls back schedule physical plus.    Attempt #1    Percy Roche at 2:55 PM on 12/28/2020  [facility] Health Guide  Phone [phone]

## 2020-12-28 NOTE — PATIENT INSTRUCTIONS
Jimmie Yang,    It was nice to see you today. Please do an evisit with me about 6 weeks after you start the Wellbutrin.    Also, I will have the clinic contact you to get a physical for April with a PAP.    Rissa Serrano MD  Internal Medicine/Pediatrics  [facility]

## 2020-12-29 ASSESSMENT — ANXIETY QUESTIONNAIRES: GAD7 TOTAL SCORE: 6

## 2021-01-04 DIAGNOSIS — B00.9 HSV-2 INFECTION: ICD-10-CM

## 2021-01-04 RX ORDER — ACYCLOVIR 400 MG/1
400 TABLET ORAL DAILY
Qty: 60 TABLET | Refills: 1 | Status: SHIPPED | OUTPATIENT
Start: 2021-01-04 | End: 2021-02-24

## 2021-01-04 NOTE — TELEPHONE ENCOUNTER
"Requested Prescriptions   Pending Prescriptions Disp Refills     acyclovir (ZOVIRAX) 400 MG tablet 60 tablet 1     Sig: Take 1 tablet (400 mg) by mouth daily , 1 tab twice daily during outbreak       Antivirals for Herpes Protocol Failed - 1/4/2021  2:01 PM        Failed - Normal serum creatinine on file in past 12 months     Recent Labs   Lab Test 10/25/12  1129   CR 0.74       Ok to refill medication if creatinine is low          Passed - Patient is age 12 or older        Passed - Recent (12 mo) or future (30 days) visit within the authorizing provider's specialty     Patient has had an office visit with the authorizing provider or a provider within the authorizing providers department within the previous 12 mos or has a future within next 30 days. See \"Patient Info\" tab in inbasket, or \"Choose Columns\" in Meds & Orders section of the refill encounter.              Passed - Medication is active on med list           Last Written Prescription Date:  05/31/2020  Last Fill Quantity: 60,  # refills: 1   Last office visit: 3/3/2020 with prescribing provider: Rema Valadez NP   Future Office Visit:      "

## 2021-01-04 NOTE — TELEPHONE ENCOUNTER
Prescription approved per [facility] Refill Protocol.  Rema Beck RN on 1/4/2021 at 2:12 PM

## 2021-02-11 NOTE — TELEPHONE ENCOUNTER
CHG call to assist with scheduling physical plus. No answer, LVM asking to call back on CHG direct extension.      If patient calls back schedule physical plus.     Attempt #2    Percy Roche at 9:33 AM on 2/11/2021  [facility] Health Guide  Phone [phone]

## 2021-02-23 NOTE — PROGRESS NOTES
Celia is a 30 year old  female who presents for annual exam.     Besides routine health maintenance,  she would like to discuss jaw clenching and ringing ears.    HPI:  The patient's PCP is  Rissa Serrano MD.  Pt here today for her annual gyn exam. She is feeling well.   IUD due for exchange. We will remove this today.   Due for fasting labs and Pap.   She's been seen at her dentist and has been using a mouth guard since October for TMJ. She now has a lot more pain in her jaw as well as bilateral ringing in her ears. No headaches/vision changes or dizziness.      GYNECOLOGIC HISTORY:    No LMP recorded. (Menstrual status: IUD).    Regular menses? No, IUD      Her current contraception method is: IUD.  She  reports that she quit smoking about 6 years ago. She has never used smokeless tobacco.    Patient is sexually active.  Last PHQ-9 score on record =   PHQ-9 SCORE 2021   PHQ-9 Total Score -   PHQ-9 Total Score MyChart -   PHQ-9 Total Score 8     Last GAD7 score on record =   JUDAH-7 SCORE 2021   Total Score -   Total Score -   Total Score 5     Alcohol Score = 3    HEALTH MAINTENANCE:  Cholesterol:   Cholesterol   Date Value Ref Range Status   2017 213 (H) <200 mg/dL Final     Comment:     Desirable:       <200 mg/dl   2010 172 0 - 200 mg/dL Final     Comment:     LDL Cholesterol is the primary guide to therapy.   The NCEP recommends further evaluation of: patients with cholesterol <200   mg/dL   if additional risk factors are present, cholesterol >240 mg/dL, triglycerides   >150 mg/dL, or HDL <40 mg/dL.      Last Mammo: Not applicable, Result: Not applicable, Next Mammo: Due at age 40   Pap:   Lab Results   Component Value Date    PAP NIL 2018    PAP NIL 2014    PAP ASC-US 10/25/2012      Colonoscopy:  never, Result: Not applicable, Next Colonoscopy: age 48.  Dexa:  never    Health maintenance updated:  Due for Tdap    HISTORY:  OB History    Para Term  AB  Living   0 0 0 0 0 0   SAB TAB Ectopic Multiple Live Births   0 0 0 0 0       Patient Active Problem List   Diagnosis     CARDIOVASCULAR SCREENING; LDL GOAL LESS THAN 160     Anxiety     Moderate single current episode of major depressive disorder (H)     Past Surgical History:   Procedure Laterality Date     TONSILLECTOMY        Social History     Tobacco Use     Smoking status: Former Smoker     Quit date: 2014     Years since quittin.2     Smokeless tobacco: Never Used     Tobacco comment: socially    Substance Use Topics     Alcohol use: Yes     Alcohol/week: 2.0 standard drinks     Types: 2 Standard drinks or equivalent per week     Comment: socailly       Problem (# of Occurrences) Relation (Name,Age of Onset)    Bipolar Disorder (1) Father (70)    Breast Cancer (2) Paternal Grandmother, Mother (60)    Cancer - colorectal (1) Paternal Grandfather    Diabetes (1) Father    Hypertension (1) Father            Current Outpatient Medications   Medication Sig     acyclovir (ZOVIRAX) 400 MG tablet Take 1 tablet (400 mg) by mouth daily , 1 tab twice daily during outbreak     acyclovir (ZOVIRAX) 5 % external ointment Apply topically 6 times daily     buPROPion (WELLBUTRIN XL) 150 MG 24 hr tablet Take 1 tablet (150 mg) by mouth every morning     FLUoxetine (PROZAC) 40 MG capsule TAKE 2 CAPSULES BY MOUTH EVERY DAY     hydrOXYzine (VISTARIL) 50 MG capsule TAKE 1 CAPSULE BY MOUTH 3 TIMES DAILY AS NEEDED FOR ANXIETY     Multiple Vitamins-Minerals (MULTIVITAMIN ADULT PO)      ondansetron (ZOFRAN-ODT) 4 MG ODT tab TAKE 1-2 TABLETS BY MOUTH EVERY 8 HOURS AS NEEDED FOR NAUSEA     UNABLE TO FIND MEDICATION NAME: Z- patch by yazan for sleep     No current facility-administered medications for this visit.      No Known Allergies    Past medical, surgical, social and family histories were reviewed and updated in EPIC.    ROS:   12 point review of systems negative other than symptoms noted below or in the HPI.  No  "urinary frequency or dysuria, bladder or kidney problems    EXAM:  /72   Pulse 68   Ht 1.683 m (5' 6.25\")   Wt 79.4 kg (175 lb)   BMI 28.03 kg/m     BMI: Body mass index is 28.03 kg/m .    PHYSICAL EXAM:  Constitutional:   Appearance: Well nourished, well developed, alert, in no acute distress  Neck:  Lymph Nodes:  No lymphadenopathy present    Thyroid:  Gland size normal, nontender, no nodules or masses present  on palpation  Chest:  Respiratory Effort:  Breathing unlabored  Cardiovascular:    Heart: Auscultation:  Regular rate, normal rhythm, no murmurs present  Breasts: Inspection of Breasts:  No lymphadenopathy present., Palpation of Breasts and Axillae:  No masses present on palpation, no breast tenderness., Axillary Lymph Nodes:  No lymphadenopathy present. and No nodularity, asymmetry or nipple discharge bilaterally.  Gastrointestinal:   Abdominal Examination:  Abdomen nontender to palpation, tone normal without rigidity or guarding, no masses present, umbilicus without lesions   Liver and Spleen:  No hepatomegaly present, liver nontender to palpation    Hernias:  No hernias present  Lymphatic: Lymph Nodes:  No other lymphadenopathy present  Skin:  General Inspection:  No rashes present, no lesions present, no areas of  discoloration  Neurologic:    Mental Status:  Oriented X3.  Normal strength and tone, sensory exam                grossly normal, mentation intact and speech normal.    Psychiatric:   Mentation appears normal and affect normal/bright.         Pelvic Exam:  External Genitalia:     Normal appearance for age, no discharge present, no tenderness present, no inflammatory lesions present, color normal  Vagina:    Normal vaginal vault without central or paravaginal defects, no discharge present, no inflammatory lesions present, no masses present  Bladder:     Nontender to palpation  Urethra:   Urethral Body:  Urethra palpation normal, urethra structural support normal   Urethral Meatus:  No " erythema or lesions present  Cervix:     Appearance healthy, no lesions present, nontender to palpation, no bleeding present, string present  Uterus:     Nontender to palpation, no masses present, position anteflexed, mobility: normal  Adnexa:     No adnexal tenderness present, no adnexal masses present  Perineum:     Perineum within normal limits, no evidence of trauma, no rashes or skin lesions present  Anus:     Anus within normal limits, no hemorrhoids present  Inguinal Lymph Nodes:     No lymphadenopathy present  Pubic Hair:     Normal pubic hair distribution for age  Genitalia and Groin:     No rashes present, no lesions present, no areas of discoloration, no masses present      COUNSELING:   Special attention given to:        Regular exercise       Healthy diet/nutrition       Contraception       Safe sex practices/STD prevention    BMI: Body mass index is 28.03 kg/m .  Weight management plan: Discussed healthy diet and exercise guidelines    ASSESSMENT:  30 year old female with satisfactory annual exam.    ICD-10-CM    1. Encounter for gynecological examination without abnormal finding  Z01.419 HPV High Risk Types DNA Cervical     Pap imaged thin layer screen with HPV - recommended age 30 - 65 years (select HPV order below)   2. HSV-2 infection  B00.9 acyclovir (ZOVIRAX) 400 MG tablet   3. Encounter for removal of intrauterine contraceptive device  Z30.432 REMOVE INTRAUTERINE DEVICE   4. Tinnitus of both ears  H93.13 OTOLARYNGOLOGY REFERRAL   5. Screening for lipid disorders  Z13.220 Lipid panel reflex to direct LDL Fasting   6. Screening for metabolic disorder  Z13.228 Comprehensive metabolic panel       PLAN:  IUD removed today. See note. Will RTC for reinsertion.   ENT referral. Needs fasting labs.  Pap updated. If NIL pap every 3 years.     ALEXANDER Last CNP  IUD Removal:  SUBJECTIVE:    Is a pregnancy test required: No.  Was a consent obtained?  Yes    Celia Castro is a 30 year old  female,, No LMP recorded. (Menstrual status: IUD). who presents today for IUD removal. Her current IUD was placed 2015. She has not had problems with the IUD. She requests removal of the IUD because the IUD effectiveness has     Today's PHQ-2 Score:   PHQ-2 (  [employer]) 2021   Q1: Little interest or pleasure in doing things 1   Q2: Feeling down, depressed or hopeless 1   PHQ-2 Score 2   Q1: Little interest or pleasure in doing things Several days   Q2: Feeling down, depressed or hopeless Several days   PHQ-2 Score 2       PROCEDURE:    A speculum exam was performed and the cervix was visualized. The IUD string was visualized. Using ring forceps, the string  was grasped and the IUD removed intact.    POST PROCEDURE:    The patient tolerated the procedure well. Patient was discharged in stable condition.    Call if bleeding, pain or fever occur., Birth control counseling given. and Reinsertion of IUD scheduled.    ALEXANDER Last CNP

## 2021-02-24 ENCOUNTER — OFFICE VISIT (OUTPATIENT)
Dept: OBGYN | Facility: CLINIC | Age: 31
End: 2021-02-24
Payer: COMMERCIAL

## 2021-02-24 VITALS
BODY MASS INDEX: 28.12 KG/M2 | DIASTOLIC BLOOD PRESSURE: 72 MMHG | SYSTOLIC BLOOD PRESSURE: 112 MMHG | WEIGHT: 175 LBS | HEIGHT: 66 IN | HEART RATE: 68 BPM

## 2021-02-24 DIAGNOSIS — Z13.220 SCREENING FOR LIPID DISORDERS: ICD-10-CM

## 2021-02-24 DIAGNOSIS — Z30.432 ENCOUNTER FOR REMOVAL OF INTRAUTERINE CONTRACEPTIVE DEVICE: ICD-10-CM

## 2021-02-24 DIAGNOSIS — B00.9 HSV-2 INFECTION: ICD-10-CM

## 2021-02-24 DIAGNOSIS — Z01.419 ENCOUNTER FOR GYNECOLOGICAL EXAMINATION WITHOUT ABNORMAL FINDING: Primary | ICD-10-CM

## 2021-02-24 DIAGNOSIS — H93.13 TINNITUS OF BOTH EARS: ICD-10-CM

## 2021-02-24 DIAGNOSIS — Z13.228 SCREENING FOR METABOLIC DISORDER: ICD-10-CM

## 2021-02-24 PROCEDURE — 87624 HPV HI-RISK TYP POOLED RSLT: CPT | Performed by: NURSE PRACTITIONER

## 2021-02-24 PROCEDURE — 58301 REMOVE INTRAUTERINE DEVICE: CPT | Performed by: NURSE PRACTITIONER

## 2021-02-24 PROCEDURE — G0145 SCR C/V CYTO,THINLAYER,RESCR: HCPCS | Performed by: NURSE PRACTITIONER

## 2021-02-24 PROCEDURE — 99395 PREV VISIT EST AGE 18-39: CPT | Mod: 25 | Performed by: NURSE PRACTITIONER

## 2021-02-24 RX ORDER — ACYCLOVIR 400 MG/1
400 TABLET ORAL DAILY
Qty: 90 TABLET | Refills: 3 | Status: SHIPPED | OUTPATIENT
Start: 2021-02-24 | End: 2022-04-13

## 2021-02-24 ASSESSMENT — ANXIETY QUESTIONNAIRES
IF YOU CHECKED OFF ANY PROBLEMS ON THIS QUESTIONNAIRE, HOW DIFFICULT HAVE THESE PROBLEMS MADE IT FOR YOU TO DO YOUR WORK, TAKE CARE OF THINGS AT HOME, OR GET ALONG WITH OTHER PEOPLE: SOMEWHAT DIFFICULT
6. BECOMING EASILY ANNOYED OR IRRITABLE: SEVERAL DAYS
7. FEELING AFRAID AS IF SOMETHING AWFUL MIGHT HAPPEN: NOT AT ALL
1. FEELING NERVOUS, ANXIOUS, OR ON EDGE: MORE THAN HALF THE DAYS
2. NOT BEING ABLE TO STOP OR CONTROL WORRYING: NOT AT ALL
3. WORRYING TOO MUCH ABOUT DIFFERENT THINGS: SEVERAL DAYS
5. BEING SO RESTLESS THAT IT IS HARD TO SIT STILL: SEVERAL DAYS
GAD7 TOTAL SCORE: 5

## 2021-02-24 ASSESSMENT — PATIENT HEALTH QUESTIONNAIRE - PHQ9
SUM OF ALL RESPONSES TO PHQ QUESTIONS 1-9: 8
5. POOR APPETITE OR OVEREATING: NOT AT ALL

## 2021-02-24 ASSESSMENT — MIFFLIN-ST. JEOR: SCORE: 1524.51

## 2021-02-24 NOTE — TELEPHONE ENCOUNTER
[facility] call to assist with scheduling physical plus. Patient informed [facility] that she has been into another clinic for physical and depression follow up.    Closing encounter.    Percy Roche at 4:46 PM on 2/24/2021  [facility] Health Guide  Phone [phone]

## 2021-02-25 ASSESSMENT — ANXIETY QUESTIONNAIRES: GAD7 TOTAL SCORE: 5

## 2021-03-01 LAB
COPATH REPORT: NORMAL
PAP: NORMAL

## 2021-03-02 LAB
FINAL DIAGNOSIS: NORMAL
HPV HR 12 DNA CVX QL NAA+PROBE: NEGATIVE
HPV16 DNA SPEC QL NAA+PROBE: NEGATIVE
HPV18 DNA SPEC QL NAA+PROBE: NEGATIVE
SPECIMEN DESCRIPTION: NORMAL
SPECIMEN SOURCE CVX/VAG CYTO: NORMAL

## 2021-03-17 ENCOUNTER — OFFICE VISIT (OUTPATIENT)
Dept: AUDIOLOGY | Facility: CLINIC | Age: 31
End: 2021-03-17
Payer: COMMERCIAL

## 2021-03-17 ENCOUNTER — OFFICE VISIT (OUTPATIENT)
Dept: OTOLARYNGOLOGY | Facility: CLINIC | Age: 31
End: 2021-03-17
Payer: COMMERCIAL

## 2021-03-17 VITALS
OXYGEN SATURATION: 99 % | SYSTOLIC BLOOD PRESSURE: 126 MMHG | DIASTOLIC BLOOD PRESSURE: 79 MMHG | BODY MASS INDEX: 27.32 KG/M2 | HEIGHT: 66 IN | HEART RATE: 97 BPM | WEIGHT: 170 LBS

## 2021-03-17 DIAGNOSIS — H93.13 TINNITUS OF BOTH EARS: Primary | ICD-10-CM

## 2021-03-17 DIAGNOSIS — H93.13 TINNITUS, BILATERAL: Primary | ICD-10-CM

## 2021-03-17 PROCEDURE — 92557 COMPREHENSIVE HEARING TEST: CPT | Performed by: AUDIOLOGIST

## 2021-03-17 PROCEDURE — 99207 PR NO CHARGE LOS: CPT | Performed by: AUDIOLOGIST

## 2021-03-17 PROCEDURE — 99204 OFFICE O/P NEW MOD 45 MIN: CPT | Performed by: OTOLARYNGOLOGY

## 2021-03-17 PROCEDURE — 92550 TYMPANOMETRY & REFLEX THRESH: CPT | Performed by: AUDIOLOGIST

## 2021-03-17 RX ORDER — CYCLOBENZAPRINE HCL 5 MG
5-10 TABLET ORAL 3 TIMES DAILY PRN
Qty: 40 TABLET | Refills: 3 | Status: SHIPPED | OUTPATIENT
Start: 2021-03-17 | End: 2021-12-10

## 2021-03-17 ASSESSMENT — MIFFLIN-ST. JEOR: SCORE: 1497.86

## 2021-03-17 NOTE — LETTER
3/17/2021         RE: Celia Castro  [address]  [place] [address]        Dear Colleague,    Thank you for referring your patient, Celia Castro, to the [facility]. Please see a copy of my visit note below.    I am seeing this patient in consultation for tinnitus of both ears at the request of the provider Rema Valadez.    Chief Complaint - Tinnitus    History of Present Illness - Celia Castro is a 30 year old female who presents to me today with ringing in the ears.  It has been present and noticeable for approximately 6-7 months.  It was sudden in onset.  The patient has noticed no hearing loss. There is no history of chronic ear disease or ear surgery. With regards to recreational, , and work-related noise exposure she denies. No family history of hearing loss at a young age. No regular use of aspirin or NSAIDS. Did start Wellbutrin in the last few months. Clenches both day and night. Wears a mouth guard at night. Has some anxiety and depression.      Past Medical History -   Patient Active Problem List   Diagnosis     CARDIOVASCULAR SCREENING; LDL GOAL LESS THAN 160     Anxiety     Moderate single current episode of major depressive disorder (H)       Current Medications -   Current Outpatient Medications:      acyclovir (ZOVIRAX) 400 MG tablet, Take 1 tablet (400 mg) by mouth daily , 1 tab twice daily during outbreak, Disp: 90 tablet, Rfl: 3     acyclovir (ZOVIRAX) 5 % external ointment, Apply topically 6 times daily, Disp: 15 g, Rfl: 3     buPROPion (WELLBUTRIN XL) 150 MG 24 hr tablet, Take 1 tablet (150 mg) by mouth every morning, Disp: 90 tablet, Rfl: 0     FLUoxetine (PROZAC) 40 MG capsule, TAKE 2 CAPSULES BY MOUTH EVERY DAY, Disp: 180 capsule, Rfl: 1     hydrOXYzine (VISTARIL) 50 MG capsule, TAKE 1 CAPSULE BY MOUTH 3 TIMES DAILY AS NEEDED FOR ANXIETY, Disp: 90 capsule, Rfl: 0     Multiple Vitamins-Minerals (MULTIVITAMIN ADULT PO), , Disp: , Rfl:      ondansetron (ZOFRAN-ODT) 4  MG ODT tab, TAKE 1-2 TABLETS BY MOUTH EVERY 8 HOURS AS NEEDED FOR NAUSEA, Disp: 20 tablet, Rfl: 0     UNABLE TO FIND, MEDICATION NAME: Z- patch by yazan for sleep, Disp: , Rfl:     Allergies - No Known Allergies    Social History -   Social History     Socioeconomic History     Marital status: Single     Spouse name: Not on file     Number of children: 0     Years of education: Not on file     Highest education level: Not on file   Occupational History     Employer: NONE    Social Needs     Financial resource strain: Not on file     Food insecurity     Worry: Not on file     Inability: Not on file     Transportation needs     Medical: Not on file     Non-medical: Not on file   Tobacco Use     Smoking status: Former Smoker     Quit date: 2014     Years since quittin.3     Smokeless tobacco: Never Used     Tobacco comment: socially    Substance and Sexual Activity     Alcohol use: Yes     Alcohol/week: 2.0 standard drinks     Types: 2 Standard drinks or equivalent per week     Comment: socailly      Drug use: No     Sexual activity: Yes     Partners: Male     Birth control/protection: I.U.D.     Comment: Mirena 12/28/15   Lifestyle     Physical activity     Days per week: Not on file     Minutes per session: Not on file     Stress: Not on file   Relationships     Social connections     Talks on phone: Not on file     Gets together: Not on file     Attends [employer] service: Not on file     Active member of club or organization: Not on file     Attends meetings of clubs or organizations: Not on file     Relationship status: Not on file     Intimate partner violence     Fear of current or ex partner: Not on file     Emotionally abused: Not on file     Physically abused: Not on file     Forced sexual activity: Not on file   Other Topics Concern     Parent/sibling w/ CABG, MI or angioplasty before 65F 55M? No      Service Not Asked     Blood Transfusions Not Asked     Caffeine Concern Yes     Comment: 1  "cup qd, 1 can qd on average     Occupational Exposure Not Asked     Hobby Hazards Not Asked     Sleep Concern Not Asked     Stress Concern Not Asked     Weight Concern Not Asked     Special Diet Not Asked     Back Care Not Asked     Exercise No     Bike Helmet Not Asked     Seat Belt Yes     Self-Exams Not Asked   Social History Narrative     Not on file       Family History -   Family History   Problem Relation Age of Onset     Diabetes Father      Hypertension Father      Bipolar Disorder Father 70     Breast Cancer Paternal Grandmother      Cancer - colorectal Paternal Grandfather      Breast Cancer Mother 60     Review of Systems - As per HPI and PMHx, otherwise 7 system review of the head and neck is negative.    Physical Exam  /79   Pulse 97   Ht 1.676 m (5' 6\")   Wt 77.1 kg (170 lb)   SpO2 99%   BMI 27.44 kg/m    General - The patient is in no distress. Alert and oriented to person and place, answers questions and cooperates with examination appropriately.   Neurologic - CN II-XII are grossly intact. No focal neurologic deficits.   Voice and Breathing - The patient was breathing comfortably without the use of accessory muscles. There was no wheezing, stridor, or stertor.  The patients voice was clear and strong.  Ears - The tympanic membranes are normal in appearance, bony landmarks are intact.  No retraction, perforation, or masses. No fluid or purulence was seen in the external canal or the middle ear. No evidence of infection of the middle ear or external canal, cerumen was normal in appearance.  Eyes - Extraocular movements intact, and the pupils were reactive to light.  Sclera were not icteric or injected, conjunctiva were pink and moist.  Mouth - Examination of the oral cavity showed pink, healthy oral mucosa. No lesions or ulcerations noted.  The tongue was mobile and midline.  Throat - The walls of the oropharynx were smooth, symmetric, and had no lesions or ulcerations.  The uvula was " midline on elevation.    Neck - Soft, non-tender. Palpation of the occipital, submental, submandibular, internal jugular chain, and supraclavicular nodes did not demonstrate any abnormal lymph nodes or masses. No parotid masses. Palpation of the thyroid was soft and smooth, with no nodules or goiter appreciated.  The trachea was mobile and midline.      Audiologic Studies - An audiogram and tympanogram were performed today as part of the evaluation and personally reviewed. The tympanogram shows a normal Type A curve, with normal canal volume and middle ear pressure.  There is no sign of eustachian tube dysfunction or middle ear effusion.  The audiogram showed a normal to borderline hearing loss in the upper tones bilaterally.    Assessment and Plan - Celia Castro is a 30 year old female who presents to me today with subjective tinnitus, likely due to a combination of things. She may have some suprathreshold sensorineural hearing loss as the upper tones has a slight down-sloping appearance. This maybe the origin of the tinnitus, but it has likely been worsened by TMJ. She admits to clenching a lot, day and night. I recommend flexeril for at night. She has already implemented a mouth guard which is a great idea, continue. We discussed other TMJ precautions. She takes Wellbutrin, and this maybe contributing. She can try changing this. Lastly, she has anxiety/depression and should work on optimizing this.    We spent the remainder of today's visit on education. Discussed were steps that can be taken to mask the noise, such as a low volume de-tuned radio, a fan in the background, and/or hearing aids.  Correlation with stress, anxiety, and depression were also discussed.  The patient was also cautioned on the numerous expensive non-pharmaceutical options that are advertised, and have no proven benefit.      Vish Madrid MD  Otolaryngology  [facility]          Again, thank you for allowing me to participate in the care  of your patient.        Sincerely,        Vish Madrid MD

## 2021-03-17 NOTE — PROGRESS NOTES
I am seeing this patient in consultation for tinnitus of both ears at the request of the provider Rema Valadez.    Chief Complaint - Tinnitus    History of Present Illness - Celia Castro is a 30 year old female who presents to me today with ringing in the ears.  It has been present and noticeable for approximately 6-7 months.  It was sudden in onset.  The patient has noticed no hearing loss. There is no history of chronic ear disease or ear surgery. With regards to recreational, , and work-related noise exposure she denies. No family history of hearing loss at a young age. No regular use of aspirin or NSAIDS. Did start Wellbutrin in the last few months. Clenches both day and night. Wears a mouth guard at night. Has some anxiety and depression.      Past Medical History -   Patient Active Problem List   Diagnosis     CARDIOVASCULAR SCREENING; LDL GOAL LESS THAN 160     Anxiety     Moderate single current episode of major depressive disorder (H)       Current Medications -   Current Outpatient Medications:      acyclovir (ZOVIRAX) 400 MG tablet, Take 1 tablet (400 mg) by mouth daily , 1 tab twice daily during outbreak, Disp: 90 tablet, Rfl: 3     acyclovir (ZOVIRAX) 5 % external ointment, Apply topically 6 times daily, Disp: 15 g, Rfl: 3     buPROPion (WELLBUTRIN XL) 150 MG 24 hr tablet, Take 1 tablet (150 mg) by mouth every morning, Disp: 90 tablet, Rfl: 0     FLUoxetine (PROZAC) 40 MG capsule, TAKE 2 CAPSULES BY MOUTH EVERY DAY, Disp: 180 capsule, Rfl: 1     hydrOXYzine (VISTARIL) 50 MG capsule, TAKE 1 CAPSULE BY MOUTH 3 TIMES DAILY AS NEEDED FOR ANXIETY, Disp: 90 capsule, Rfl: 0     Multiple Vitamins-Minerals (MULTIVITAMIN ADULT PO), , Disp: , Rfl:      ondansetron (ZOFRAN-ODT) 4 MG ODT tab, TAKE 1-2 TABLETS BY MOUTH EVERY 8 HOURS AS NEEDED FOR NAUSEA, Disp: 20 tablet, Rfl: 0     UNABLE TO FIND, MEDICATION NAME: Z- patch by yazan for sleep, Disp: , Rfl:     Allergies - No Known Allergies    Social History -    Social History     Socioeconomic History     Marital status: Single     Spouse name: Not on file     Number of children: 0     Years of education: Not on file     Highest education level: Not on file   Occupational History     Employer: NONE    Social Needs     Financial resource strain: Not on file     Food insecurity     Worry: Not on file     Inability: Not on file     Transportation needs     Medical: Not on file     Non-medical: Not on file   Tobacco Use     Smoking status: Former Smoker     Quit date: 2014     Years since quittin.3     Smokeless tobacco: Never Used     Tobacco comment: socially    Substance and Sexual Activity     Alcohol use: Yes     Alcohol/week: 2.0 standard drinks     Types: 2 Standard drinks or equivalent per week     Comment: socailly      Drug use: No     Sexual activity: Yes     Partners: Male     Birth control/protection: I.U.D.     Comment: Mirena 12/28/15   Lifestyle     Physical activity     Days per week: Not on file     Minutes per session: Not on file     Stress: Not on file   Relationships     Social connections     Talks on phone: Not on file     Gets together: Not on file     Attends [employer] service: Not on file     Active member of club or organization: Not on file     Attends meetings of clubs or organizations: Not on file     Relationship status: Not on file     Intimate partner violence     Fear of current or ex partner: Not on file     Emotionally abused: Not on file     Physically abused: Not on file     Forced sexual activity: Not on file   Other Topics Concern     Parent/sibling w/ CABG, MI or angioplasty before 65F 55M? No      Service Not Asked     Blood Transfusions Not Asked     Caffeine Concern Yes     Comment: 1 cup qd, 1 can qd on average     Occupational Exposure Not Asked     Hobby Hazards Not Asked     Sleep Concern Not Asked     Stress Concern Not Asked     Weight Concern Not Asked     Special Diet Not Asked     Back Care Not Asked      "Exercise No     Bike Helmet Not Asked     Seat Belt Yes     Self-Exams Not Asked   Social History Narrative     Not on file       Family History -   Family History   Problem Relation Age of Onset     Diabetes Father      Hypertension Father      Bipolar Disorder Father 70     Breast Cancer Paternal Grandmother      Cancer - colorectal Paternal Grandfather      Breast Cancer Mother 60     Review of Systems - As per HPI and PMHx, otherwise 7 system review of the head and neck is negative.    Physical Exam  /79   Pulse 97   Ht 1.676 m (5' 6\")   Wt 77.1 kg (170 lb)   SpO2 99%   BMI 27.44 kg/m    General - The patient is in no distress. Alert and oriented to person and place, answers questions and cooperates with examination appropriately.   Neurologic - CN II-XII are grossly intact. No focal neurologic deficits.   Voice and Breathing - The patient was breathing comfortably without the use of accessory muscles. There was no wheezing, stridor, or stertor.  The patients voice was clear and strong.  Ears - The tympanic membranes are normal in appearance, bony landmarks are intact.  No retraction, perforation, or masses. No fluid or purulence was seen in the external canal or the middle ear. No evidence of infection of the middle ear or external canal, cerumen was normal in appearance.  Eyes - Extraocular movements intact, and the pupils were reactive to light.  Sclera were not icteric or injected, conjunctiva were pink and moist.  Mouth - Examination of the oral cavity showed pink, healthy oral mucosa. No lesions or ulcerations noted.  The tongue was mobile and midline.  Throat - The walls of the oropharynx were smooth, symmetric, and had no lesions or ulcerations.  The uvula was midline on elevation.    Neck - Soft, non-tender. Palpation of the occipital, submental, submandibular, internal jugular chain, and supraclavicular nodes did not demonstrate any abnormal lymph nodes or masses. No parotid masses. " Palpation of the thyroid was soft and smooth, with no nodules or goiter appreciated.  The trachea was mobile and midline.      Audiologic Studies - An audiogram and tympanogram were performed today as part of the evaluation and personally reviewed. The tympanogram shows a normal Type A curve, with normal canal volume and middle ear pressure.  There is no sign of eustachian tube dysfunction or middle ear effusion.  The audiogram showed a normal to borderline hearing loss in the upper tones bilaterally.    Assessment and Plan - Celia Castro is a 30 year old female who presents to me today with subjective tinnitus, likely due to a combination of things. She may have some suprathreshold sensorineural hearing loss as the upper tones has a slight down-sloping appearance. This maybe the origin of the tinnitus, but it has likely been worsened by TMJ. She admits to clenching a lot, day and night. I recommend flexeril for at night. She has already implemented a mouth guard which is a great idea, continue. We discussed other TMJ precautions. She takes Wellbutrin, and this maybe contributing. She can try changing this. Lastly, she has anxiety/depression and should work on optimizing this.    We spent the remainder of today's visit on education. Discussed were steps that can be taken to mask the noise, such as a low volume de-tuned radio, a fan in the background, and/or hearing aids.  Correlation with stress, anxiety, and depression were also discussed.  The patient was also cautioned on the numerous expensive non-pharmaceutical options that are advertised, and have no proven benefit.      Vish Madrid MD  Otolaryngology  [facility]

## 2021-03-17 NOTE — PROGRESS NOTES
AUDIOLOGY REPORT:    Patient was referred from ENT by Leopoldo Madrid MD for audiology evaluation.  She reports bilateral tinnitus with onset approximately one month ago.  She also reports jaw clenching.Patient denies noise exposure, family history of hearing loss, otalgia, otorrhea, ear fullness.    Testing:    Otoscopy:   Otoscopic exam indicates ears are clear of cerumen bilaterally     Tympanograms:    RIGHT: normal eardrum mobility     LEFT:   normal eardrum mobility    Reflexes (reported by stimulus ear):  RIGHT: Ipsilateral is present at normal levels  RIGHT: Contralateral is present at normal levels  LEFT:   Ipsilateral is present at normal levels  LEFT:   Contralateral is present at normal levels    Thresholds:   Pure Tone Thresholds assessed using conventional audiometry with good reliability from 250-8000 Hz bilaterally using circumaural headphones      RIGHT:  normal from 250-1000 Hz sloping to borderline-normal from 8450-5995 Hz. No air-bone gap present.    LEFT:     normal from 250-1000 Hz sloping to borderline-normal from 6464-2683 Hz. No air-bone gap present.    Speech Reception Threshold:    RIGHT: 5 dB HL    LEFT:   10 dB HL  Speech Reception Thresholds are in good agreement with pure tone thresholds.    Word Recognition Score:     RIGHT: 100% at 50 dB HL using NU-6 recorded word list.    LEFT:   100% at 50 dB HL using NU-6 recorded word list.    Discussed results with the patient.  I recommended followup hearing check in 3 years; sooner if symptoms increase.    Patient was returned to ENT for follow up.     Brennen Jeong MA, CCC-A  Licensed Audiologist #[id number]  3/17/2021

## 2021-03-23 ENCOUNTER — ALLIED HEALTH/NURSE VISIT (OUTPATIENT)
Dept: NURSING | Facility: CLINIC | Age: 31
End: 2021-03-23
Payer: COMMERCIAL

## 2021-03-23 DIAGNOSIS — Z13.228 SCREENING FOR METABOLIC DISORDER: ICD-10-CM

## 2021-03-23 DIAGNOSIS — Z13.220 SCREENING FOR LIPID DISORDERS: ICD-10-CM

## 2021-03-23 DIAGNOSIS — Z23 NEED FOR TDAP VACCINATION: Primary | ICD-10-CM

## 2021-03-23 LAB
ALBUMIN SERPL-MCNC: 3.9 G/DL (ref 3.4–5)
ALP SERPL-CCNC: 60 U/L (ref 40–150)
ALT SERPL W P-5'-P-CCNC: 19 U/L (ref 0–50)
ANION GAP SERPL CALCULATED.3IONS-SCNC: 2 MMOL/L (ref 3–14)
AST SERPL W P-5'-P-CCNC: 11 U/L (ref 0–45)
BILIRUB SERPL-MCNC: 0.3 MG/DL (ref 0.2–1.3)
BUN SERPL-MCNC: 13 MG/DL (ref 7–30)
CALCIUM SERPL-MCNC: 9.2 MG/DL (ref 8.5–10.1)
CHLORIDE SERPL-SCNC: 106 MMOL/L (ref 94–109)
CHOLEST SERPL-MCNC: 206 MG/DL
CO2 SERPL-SCNC: 30 MMOL/L (ref 20–32)
CREAT SERPL-MCNC: 0.81 MG/DL (ref 0.52–1.04)
GFR SERPL CREATININE-BSD FRML MDRD: >90 ML/MIN/{1.73_M2}
GLUCOSE SERPL-MCNC: 90 MG/DL (ref 70–99)
HDLC SERPL-MCNC: 73 MG/DL
LDLC SERPL CALC-MCNC: 123 MG/DL
NONHDLC SERPL-MCNC: 133 MG/DL
POTASSIUM SERPL-SCNC: 3.9 MMOL/L (ref 3.4–5.3)
PROT SERPL-MCNC: 7.5 G/DL (ref 6.8–8.8)
SODIUM SERPL-SCNC: 138 MMOL/L (ref 133–144)
TRIGL SERPL-MCNC: 51 MG/DL

## 2021-03-23 PROCEDURE — 80061 LIPID PANEL: CPT | Performed by: NURSE PRACTITIONER

## 2021-03-23 PROCEDURE — 99207 PR NO CHARGE NURSE ONLY: CPT

## 2021-03-23 PROCEDURE — 90471 IMMUNIZATION ADMIN: CPT

## 2021-03-23 PROCEDURE — 80053 COMPREHEN METABOLIC PANEL: CPT | Performed by: NURSE PRACTITIONER

## 2021-03-23 PROCEDURE — 90715 TDAP VACCINE 7 YRS/> IM: CPT

## 2021-03-23 PROCEDURE — 36415 COLL VENOUS BLD VENIPUNCTURE: CPT | Performed by: NURSE PRACTITIONER

## 2021-03-27 ENCOUNTER — E-VISIT (OUTPATIENT)
Dept: PEDIATRICS | Facility: CLINIC | Age: 31
End: 2021-03-27
Payer: COMMERCIAL

## 2021-03-27 DIAGNOSIS — F32.1 MODERATE SINGLE CURRENT EPISODE OF MAJOR DEPRESSIVE DISORDER (H): Primary | ICD-10-CM

## 2021-03-27 DIAGNOSIS — F41.9 ANXIETY: ICD-10-CM

## 2021-03-27 PROCEDURE — 99421 OL DIG E/M SVC 5-10 MIN: CPT | Performed by: PEDIATRICS

## 2021-03-27 ASSESSMENT — PATIENT HEALTH QUESTIONNAIRE - PHQ9
10. IF YOU CHECKED OFF ANY PROBLEMS, HOW DIFFICULT HAVE THESE PROBLEMS MADE IT FOR YOU TO DO YOUR WORK, TAKE CARE OF THINGS AT HOME, OR GET ALONG WITH OTHER PEOPLE: SOMEWHAT DIFFICULT
SUM OF ALL RESPONSES TO PHQ QUESTIONS 1-9: 8
SUM OF ALL RESPONSES TO PHQ QUESTIONS 1-9: 8

## 2021-03-27 ASSESSMENT — ANXIETY QUESTIONNAIRES
5. BEING SO RESTLESS THAT IT IS HARD TO SIT STILL: SEVERAL DAYS
GAD7 TOTAL SCORE: 7
2. NOT BEING ABLE TO STOP OR CONTROL WORRYING: SEVERAL DAYS
GAD7 TOTAL SCORE: 7
GAD7 TOTAL SCORE: 7
7. FEELING AFRAID AS IF SOMETHING AWFUL MIGHT HAPPEN: NOT AT ALL
7. FEELING AFRAID AS IF SOMETHING AWFUL MIGHT HAPPEN: NOT AT ALL
6. BECOMING EASILY ANNOYED OR IRRITABLE: SEVERAL DAYS
3. WORRYING TOO MUCH ABOUT DIFFERENT THINGS: NOT AT ALL
1. FEELING NERVOUS, ANXIOUS, OR ON EDGE: NEARLY EVERY DAY
4. TROUBLE RELAXING: SEVERAL DAYS

## 2021-03-28 ASSESSMENT — PATIENT HEALTH QUESTIONNAIRE - PHQ9: SUM OF ALL RESPONSES TO PHQ QUESTIONS 1-9: 8

## 2021-03-28 ASSESSMENT — ANXIETY QUESTIONNAIRES: GAD7 TOTAL SCORE: 7

## 2021-03-29 RX ORDER — BUPROPION HYDROCHLORIDE 75 MG/1
75 TABLET ORAL DAILY
Qty: 30 TABLET | Refills: 0 | Status: SHIPPED | OUTPATIENT
Start: 2021-03-29 | End: 2021-05-19

## 2021-03-29 NOTE — PATIENT INSTRUCTIONS
Jimmie Yang,    I'm sorry to hear about the tinnitus!  I think if there's any change this can be related to the Wellbutrin, we should try weaning you off.  It looks like tinnitus can occur in up to 6% of patients.    I would recommend we taper you off.  We will decrease the dose to 75mg for atleast 2 weeks, then try going off.  We may need to go slower if you feel your depression is worsening.  I will send this prescription to your pharmacy.    I would like you to follow up in clinic in about a month.  We will make this an inperson visit since we haven't seen you in well over a year.  The clinic will call to schedule you with myself or Tayolr, our PA.  I don't want to start any other new medication right now, as it may cloud the picture.    I hope this helps!    Rissa Serrano MD  Internal Medicine/Pediatrics  [facility]

## 2021-05-19 ENCOUNTER — OFFICE VISIT (OUTPATIENT)
Dept: PEDIATRICS | Facility: CLINIC | Age: 31
End: 2021-05-19
Payer: COMMERCIAL

## 2021-05-19 VITALS
TEMPERATURE: 97.4 F | HEART RATE: 91 BPM | OXYGEN SATURATION: 99 % | RESPIRATION RATE: 20 BRPM | DIASTOLIC BLOOD PRESSURE: 72 MMHG | BODY MASS INDEX: 28.75 KG/M2 | SYSTOLIC BLOOD PRESSURE: 128 MMHG | WEIGHT: 178.9 LBS | HEIGHT: 66 IN

## 2021-05-19 DIAGNOSIS — M26.609 TMJ (TEMPOROMANDIBULAR JOINT SYNDROME): Primary | ICD-10-CM

## 2021-05-19 DIAGNOSIS — F32.1 MODERATE SINGLE CURRENT EPISODE OF MAJOR DEPRESSIVE DISORDER (H): ICD-10-CM

## 2021-05-19 DIAGNOSIS — F41.0 PANIC ATTACK: ICD-10-CM

## 2021-05-19 PROCEDURE — 99214 OFFICE O/P EST MOD 30 MIN: CPT | Performed by: PEDIATRICS

## 2021-05-19 RX ORDER — LORAZEPAM 0.5 MG/1
0.5 TABLET ORAL EVERY 8 HOURS PRN
Qty: 30 TABLET | Refills: 0 | Status: SHIPPED | OUTPATIENT
Start: 2021-05-19 | End: 2022-08-29

## 2021-05-19 RX ORDER — FLUOXETINE 40 MG/1
CAPSULE ORAL
Qty: 180 CAPSULE | Refills: 1 | Status: SHIPPED | OUTPATIENT
Start: 2021-05-19 | End: 2021-08-16

## 2021-05-19 ASSESSMENT — ANXIETY QUESTIONNAIRES
3. WORRYING TOO MUCH ABOUT DIFFERENT THINGS: NOT AT ALL
1. FEELING NERVOUS, ANXIOUS, OR ON EDGE: SEVERAL DAYS
6. BECOMING EASILY ANNOYED OR IRRITABLE: SEVERAL DAYS
IF YOU CHECKED OFF ANY PROBLEMS ON THIS QUESTIONNAIRE, HOW DIFFICULT HAVE THESE PROBLEMS MADE IT FOR YOU TO DO YOUR WORK, TAKE CARE OF THINGS AT HOME, OR GET ALONG WITH OTHER PEOPLE: SOMEWHAT DIFFICULT
GAD7 TOTAL SCORE: 4
2. NOT BEING ABLE TO STOP OR CONTROL WORRYING: NOT AT ALL
5. BEING SO RESTLESS THAT IT IS HARD TO SIT STILL: SEVERAL DAYS
7. FEELING AFRAID AS IF SOMETHING AWFUL MIGHT HAPPEN: NOT AT ALL

## 2021-05-19 ASSESSMENT — MIFFLIN-ST. JEOR: SCORE: 1538.24

## 2021-05-19 ASSESSMENT — PATIENT HEALTH QUESTIONNAIRE - PHQ9
5. POOR APPETITE OR OVEREATING: SEVERAL DAYS
SUM OF ALL RESPONSES TO PHQ QUESTIONS 1-9: 7

## 2021-05-19 ASSESSMENT — ENCOUNTER SYMPTOMS: NERVOUS/ANXIOUS: 1

## 2021-05-19 NOTE — PROGRESS NOTES
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 Offered referral - patient can call if desires  - OTOLARYNGOLOGY REFERRAL    Moderate single current episode of major depressive disorder (H)  Stable right now.  Tinnitus did not improve.  Also did not seem that wellbutrin helped. With summer here, mood is better and prozac ok.  In 3 months will follow up and consider collaborative care referral.   - FLUoxetine (PROZAC) 40 MG capsule; TAKE 2 CAPSULES BY MOUTH EVERY DAY    Panic attack  hydodroxyzine not working - ok to trial lorazepam.   - LORazepam (ATIVAN) 0.5 MG tablet; Take 1 tablet (0.5 mg) by mouth every 8 hours as needed for anxiety             Patient Instructions   Keep prozac the same for now - the fall we will plan on a referral to collaborative care for additional medications if needed.    Follow-up in 3 months.    Ativan for as needed use - no more than 1-2 times per week.      Return in about 3 months (around 8/19/2021) for Follow up, using a video visit, Depression/Anxiety.    Rissa Serrano MD  [facility] NARCISO Yang is a 30 year old who presents for the following health issues     Anxiety         Depression and Anxiety Follow-Up    How are you doing with your depression since your last visit? No change    How are you doing with your anxiety since your last visit?  No change    Are you having other symptoms that might be associated with depression or anxiety? Yes:  TMJ isssues    Have you had a significant life event? No     Do you have any concerns with your use of alcohol or other drugs? No    Wellbutrin - helped at first, then leveled off.  Feels the same now - a month off the wellbutrin.     Tinnitus - bilateral - notices more at night.  "    Paroxitine - side effects  Buspar - did not help and side effects  Wellbutrin - unsure if it was by itself or with something else  Xanax - for prn use  Effexor - unsure (long time ago)    TMJ: wears mouth guard     Social History     Tobacco Use     Smoking status: Former Smoker     Packs/day: 0.00     Years: 0.00     Pack years: 0.00     Quit date: 2014     Years since quittin.5     Smokeless tobacco: Never Used     Tobacco comment: socially    Substance Use Topics     Alcohol use: Yes     Alcohol/week: 2.0 standard drinks     Comment: socailly      Drug use: No     PHQ 2021 3/27/2021 2021   PHQ-9 Total Score 8 8 7   Q9: Thoughts of better off dead/self-harm past 2 weeks Not at all Not at all Not at all     JUDAH-7 SCORE 2021 3/27/2021 2021   Total Score - - -   Total Score - 7 (mild anxiety) -   Total Score 5 7 4         Suicide Assessment Five-step Evaluation and Treatment (SAFE-T)          Review of Systems   Psychiatric/Behavioral: The patient is nervous/anxious.       Constitutional, HEENT, cardiovascular, pulmonary, gi and gu systems are negative, except as otherwise noted.      Objective    /72 (BP Location: Right arm, Patient Position: Sitting, Cuff Size: Adult Regular)   Pulse 91   Temp 97.4  F (36.3  C) (Tympanic)   Resp 20   Ht 1.676 m (5' 6\")   Wt 81.1 kg (178 lb 14.4 oz)   SpO2 99%   BMI 28.88 kg/m    Body mass index is 28.88 kg/m .  Physical Exam                   "

## 2021-05-19 NOTE — PATIENT INSTRUCTIONS
Keep prozac the same for now - the fall we will plan on a referral to collaborative care for additional medications if needed.    Follow-up in 3 months.    Ativan for as needed use - no more than 1-2 times per week.

## 2021-05-20 ASSESSMENT — ANXIETY QUESTIONNAIRES: GAD7 TOTAL SCORE: 4

## 2021-08-16 ENCOUNTER — VIRTUAL VISIT (OUTPATIENT)
Dept: PEDIATRICS | Facility: CLINIC | Age: 31
End: 2021-08-16
Payer: COMMERCIAL

## 2021-08-16 ENCOUNTER — TELEPHONE (OUTPATIENT)
Dept: PEDIATRICS | Facility: CLINIC | Age: 31
End: 2021-08-16

## 2021-08-16 DIAGNOSIS — F41.0 PANIC ATTACK: ICD-10-CM

## 2021-08-16 DIAGNOSIS — F32.1 MODERATE SINGLE CURRENT EPISODE OF MAJOR DEPRESSIVE DISORDER (H): Primary | ICD-10-CM

## 2021-08-16 PROCEDURE — 99213 OFFICE O/P EST LOW 20 MIN: CPT | Mod: 95 | Performed by: PEDIATRICS

## 2021-08-16 RX ORDER — FLUOXETINE 40 MG/1
CAPSULE ORAL
Qty: 180 CAPSULE | Refills: 1 | Status: SHIPPED | OUTPATIENT
Start: 2021-08-16 | End: 2022-08-23

## 2021-08-16 ASSESSMENT — ANXIETY QUESTIONNAIRES
8. IF YOU CHECKED OFF ANY PROBLEMS, HOW DIFFICULT HAVE THESE MADE IT FOR YOU TO DO YOUR WORK, TAKE CARE OF THINGS AT HOME, OR GET ALONG WITH OTHER PEOPLE?: SOMEWHAT DIFFICULT
7. FEELING AFRAID AS IF SOMETHING AWFUL MIGHT HAPPEN: NOT AT ALL
4. TROUBLE RELAXING: SEVERAL DAYS
7. FEELING AFRAID AS IF SOMETHING AWFUL MIGHT HAPPEN: NOT AT ALL
GAD7 TOTAL SCORE: 3
5. BEING SO RESTLESS THAT IT IS HARD TO SIT STILL: NOT AT ALL
3. WORRYING TOO MUCH ABOUT DIFFERENT THINGS: NOT AT ALL
1. FEELING NERVOUS, ANXIOUS, OR ON EDGE: SEVERAL DAYS
GAD7 TOTAL SCORE: 3
6. BECOMING EASILY ANNOYED OR IRRITABLE: SEVERAL DAYS
GAD7 TOTAL SCORE: 3
2. NOT BEING ABLE TO STOP OR CONTROL WORRYING: NOT AT ALL

## 2021-08-16 ASSESSMENT — PATIENT HEALTH QUESTIONNAIRE - PHQ9
10. IF YOU CHECKED OFF ANY PROBLEMS, HOW DIFFICULT HAVE THESE PROBLEMS MADE IT FOR YOU TO DO YOUR WORK, TAKE CARE OF THINGS AT HOME, OR GET ALONG WITH OTHER PEOPLE: SOMEWHAT DIFFICULT
SUM OF ALL RESPONSES TO PHQ QUESTIONS 1-9: 8

## 2021-08-16 NOTE — PATIENT INSTRUCTIONS
Jimmie Yang,    It was nice to talk with you today.    We will continue the prozac 80mg for now and plan for a follow up in 3 months.    Rissa Serrano MD  Internal Medicine/Pediatrics  [facility]

## 2021-08-16 NOTE — TELEPHONE ENCOUNTER
Called the pt to set up the appointment. No answer left message with my direct line .    Mayuri Alexandra EMT 3:17 PM on August 16, 2021  [facility] Health Guide  [phone]

## 2021-08-16 NOTE — PROGRESS NOTES
"Celia is a 30 year old who is being evaluated via a billable video visit.      How would you like to obtain your AVS? MyChart  If the video visit is dropped, the invitation should be resent by: Text to cell phone: [phone]  Will anyone else be joining your video visit? No      Video Start Time: 1:44pm    Assessment & Plan     Panic attack  Ok to continue with rare use of ativan,  reviewed.     Moderate single current episode of major depressive disorder (H)  Stable, patient anticipates may need more help in the fall - plan for collaborative care consult vs econsult given her multiple failed medications/reactions to medications. Plan for follow up in 3 months - if patient would like collaboriative care referral sooner, ok.     Paroxitine - side effects  Buspar - did not help and side effects  Wellbutrin - unsure if it was by itself or with something else  Xanax - for prn use  Effexor - unsure (long time ago)    - FLUoxetine (PROZAC) 40 MG capsule; TAKE 2 CAPSULES BY MOUTH EVERY DAY             BMI:   Estimated body mass index is 28.88 kg/m  as calculated from the following:    Height as of 5/19/21: 1.676 m (5' 6\").    Weight as of 5/19/21: 81.1 kg (178 lb 14.4 oz).       Patient Instructions   Hi Celia,    It was nice to talk with you today.    We will continue the prozac 80mg for now and plan for a follow up in 3 months.    Rissa Serrano MD  Internal Medicine/Pediatrics  [facility]          Return in about 3 months (around 11/16/2021) for Follow up, with me, Depression/Anxiety, using a video visit.    Rissa Serrano MD  [facility]    Subjective   Celia isTATEMENT (Optional):[id number]}   a 30 year old who presents for the following health issues medication  and Mental health check in.    History of Present Illness       Mental Health Follow-up:  Patient presents to follow-up on Depression & Anxiety.Patient's depression since last visit has been:  No change  The patient is not " "having other symptoms associated with depression.  Patient's anxiety since last visit has been:  No change  The patient is not having other symptoms associated with anxiety.  Any significant life events: No  Patient is feeling anxious or having panic attacks.  Patient has no concerns about alcohol or drug use.     Social History  Tobacco Use    Smoking status: Former Smoker      Packs/day: 0.00      Years: 0.00      Pack years: 0      Quit date: 2014      Years since quittin.7    Smokeless tobacco: Never Used    Tobacco comment: socially   Alcohol use: Yes    Alcohol/week: 2.0 standard drinks    Comment: socailly   Drug use: No      Today's PHQ-9         PHQ-9 Total Score:     (P) 8   PHQ-9 Q9 Thoughts of better off dead/self-harm past 2 weeks :   (P) Not at all   Thoughts of suicide or self harm:      Self-harm Plan:        Self-harm Action:          Safety concerns for self or others:           She eats 2-3 servings of fruits and vegetables daily.She consumes 3 sweetened beverage(s) daily.She exercises with enough effort to increase her heart rate 30 to 60 minutes per day.  She exercises with enough effort to increase her heart rate 3 or less days per week. She is missing 1 dose(s) of medications per week.  She is not taking prescribed medications regularly due to remembering to take.     Answers for HPI/ROS submitted by the patient on 2021  If you checked off any problems, how difficult have these problems made it for you to do your work, take care of things at home, or get along with other people?: Somewhat difficult  PHQ9 TOTAL SCORE: 8  JUDAH 7 TOTAL SCORE: 3      Depression and Anxiety Follow-Up    How are you doing with your depression since your last visit? \"No change    How are you doing with your anxiety since your last visit?  No change    Are you having other symptoms that might be associated with depression or anxiety? No    Have you had a significant life event? No     Do you have any " concerns with your use of alcohol or other drugs? No    Patient currently taking prozac 80mg and ativan prn.     Last seen on 21 for TMJ, depression and panic attacks.  Hydroxyzine had not helped, so we are doing a trial of ativan. She has also tried wellbutrin in the past.     Overall going pretty well     Has only used the ativan only 2-3 times.     Energy and sleep still poor, but she attributes this to shift work, stress.       Social History     Tobacco Use     Smoking status: Former Smoker     Packs/day: 0.00     Years: 0.00     Pack years: 0.00     Quit date: 2014     Years since quittin.7     Smokeless tobacco: Never Used     Tobacco comment: socially    Substance Use Topics     Alcohol use: Yes     Alcohol/week: 2.0 standard drinks     Comment: socailly      Drug use: No     PHQ 2021   PHQ-9 Total Score 7 8 8   Q9: Thoughts of better off dead/self-harm past 2 weeks Not at all Not at all Not at all     JUDAH-7 SCORE 3/27/2021 2021 2021   Total Score - - -   Total Score 7 (mild anxiety) - 3 (minimal anxiety)   Total Score 7 4 3         Suicide Assessment Five-step Evaluation and Treatment (SAFE-T)          Review of Systems   Constitutional, HEENT, cardiovascular, pulmonary, gi and gu systems are negative, except as otherwise noted.      Objective           Vitals:  No vitals were obtained today due to virtual visit.    Physical Exam   GENERAL: Healthy, alert and no distress  EYES: Eyes grossly normal to inspection.  No discharge or erythema, or obvious scleral/conjunctival abnormalities.  RESP: No audible wheeze, cough, or visible cyanosis.  No visible retractions or increased work of breathing.    SKIN: Visible skin clear. No significant rash, abnormal pigmentation or lesions.  NEURO: Cranial nerves grossly intact.  Mentation and speech appropriate for age.  PSYCH: Mentation appears normal, affect normal/bright, judgement and insight intact, normal speech and  appearance well-groomed.                Video-Visit Details    Type of service:  Video Visit    Video End Time:1:48pm    Originating Location (pt. Location): Home    Distant Location (provider location):  [facility]     Platform used for Video Visit: [employer]

## 2021-08-17 ASSESSMENT — PATIENT HEALTH QUESTIONNAIRE - PHQ9: SUM OF ALL RESPONSES TO PHQ QUESTIONS 1-9: 8

## 2021-08-17 ASSESSMENT — ANXIETY QUESTIONNAIRES: GAD7 TOTAL SCORE: 3

## 2021-09-26 ENCOUNTER — HEALTH MAINTENANCE LETTER (OUTPATIENT)
Age: 31
End: 2021-09-26

## 2021-10-19 PROBLEM — F32.9 MAJOR DEPRESSION: Status: RESOLVED | Noted: 2017-04-06 | Resolved: 2017-07-11

## 2021-11-08 ENCOUNTER — VIRTUAL VISIT (OUTPATIENT)
Dept: PEDIATRICS | Facility: CLINIC | Age: 31
End: 2021-11-08
Payer: COMMERCIAL

## 2021-11-08 DIAGNOSIS — F32.1 MODERATE SINGLE CURRENT EPISODE OF MAJOR DEPRESSIVE DISORDER (H): Primary | ICD-10-CM

## 2021-11-08 PROCEDURE — 99213 OFFICE O/P EST LOW 20 MIN: CPT | Mod: 95 | Performed by: PEDIATRICS

## 2021-11-08 ASSESSMENT — ANXIETY QUESTIONNAIRES
1. FEELING NERVOUS, ANXIOUS, OR ON EDGE: MORE THAN HALF THE DAYS
GAD7 TOTAL SCORE: 5
GAD7 TOTAL SCORE: 5
7. FEELING AFRAID AS IF SOMETHING AWFUL MIGHT HAPPEN: NOT AT ALL
3. WORRYING TOO MUCH ABOUT DIFFERENT THINGS: NOT AT ALL
7. FEELING AFRAID AS IF SOMETHING AWFUL MIGHT HAPPEN: NOT AT ALL
GAD7 TOTAL SCORE: 5
5. BEING SO RESTLESS THAT IT IS HARD TO SIT STILL: SEVERAL DAYS
2. NOT BEING ABLE TO STOP OR CONTROL WORRYING: NOT AT ALL
8. IF YOU CHECKED OFF ANY PROBLEMS, HOW DIFFICULT HAVE THESE MADE IT FOR YOU TO DO YOUR WORK, TAKE CARE OF THINGS AT HOME, OR GET ALONG WITH OTHER PEOPLE?: SOMEWHAT DIFFICULT
4. TROUBLE RELAXING: SEVERAL DAYS
6. BECOMING EASILY ANNOYED OR IRRITABLE: SEVERAL DAYS

## 2021-11-08 NOTE — PROGRESS NOTES
"Celia is a 30 year old who is being evaluated via a billable video visit.      How would you like to obtain your AVS? MyChart  If the video visit is dropped, the invitation should be resent by: Text to cell phone: [phone]  Will anyone else be joining your video visit? No    Video Start Time: 11:02am    Assessment & Plan     Moderate single current episode of major depressive disorder (H)  Uncontrolled - due to multiple side effects - patient will meet with collaborative care - discussed their model of care  - MENTAL HEALTH REFERRAL  - Adult; Psychiatry; Psychiatry; Collaborative Care Psychiatry Service/Bridge to Long-Term Psychiatry as indicated ([phone]); Yes; Several Medications tried and failed; Yes; We will contact you to schedule the appoin...; Future     BMI:   Estimated body mass index is 28.88 kg/m  as calculated from the following:    Height as of 5/19/21: 1.676 m (5' 6\").    Weight as of 5/19/21: 81.1 kg (178 lb 14.4 oz).       Patient Instructions   Dear Celia,    It was nice to see you today.    I have placed the referral for collaborative care.  Then you will resume visits with me after they have found a good regimen for you.    Take care,    Rissa Serrano MD  Internal Medicine/Pediatrics  [facility]          Return in about 1 year (around 11/8/2022) for Routine preventive, with me, in person.    Rissa Serrano MD  [facility] Yang is a 30 year old who presents for the following health issues     History of Present Illness       Mental Health Follow-up:  Patient presents to follow-up on Depression & Anxiety.Patient's depression since last visit has been:  No change  The patient is not having other symptoms associated with depression.  Patient's anxiety since last visit has been:  Worse  The patient is not having other symptoms associated with anxiety.  Any significant life events: No  Patient is feeling anxious or having panic " attacks.  Patient has no concerns about alcohol or drug use.     Social History  Tobacco Use    Smoking status: Former Smoker      Packs/day: 0.00      Years: 0.00      Pack years: 0      Quit date: 2014      Years since quittin.9    Smokeless tobacco: Never Used    Tobacco comment: socially   Alcohol use: Yes    Alcohol/week: 2.0 standard drinks    Comment: socailly   Drug use: No      Today's PHQ-9         PHQ-9 Total Score:     (P) 8   PHQ-9 Q9 Thoughts of better off dead/self-harm past 2 weeks :   (P) Not at all   Thoughts of suicide or self harm:      Self-harm Plan:        Self-harm Action:          Safety concerns for self or others:           Currently taking prozac 80mg daily and ativan prn,  Last visit : We anticipated she would need more help in the fall due to her side effects of multiple medications:    Paroxitine - side effects  Buspar - did not help and side effects  Wellbutrin - unsure if it was by itself or with something else  Xanax - for prn use  Effexor - unsure (long time ago)    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 Once she gets started she can see it through, hard to get started. Still on prozac 80mg and has used about five ativan since prescribed (once every couple weeks). She is interested in seeing psychiatry now as we discussed at her last visit due to her side effects from many medications.          How many servings of fruits and vegetables do you eat daily?  2-3    On average, how many sweetened beverages do you drink each day (Examples: soda, juice, sweet tea, etc.  Do NOT count diet or artificially sweetened beverages)?   2-3    How many days per week do you exercise enough to make your heart beat faster? 3 or less    How many minutes a day do you exercise enough to make your heart beat faster? 30 - 60    How many days per week do you miss taking your medication? 0        Review of Systems    Constitutional, HEENT, cardiovascular, pulmonary, gi and gu systems are negative, except as otherwise noted.      Objective           Vitals:  No vitals were obtained today due to virtual visit.    Physical Exam   GENERAL: Healthy, alert and no distress  EYES: Eyes grossly normal to inspection.  No discharge or erythema, or obvious scleral/conjunctival abnormalities.  RESP: No audible wheeze, cough, or visible cyanosis.  No visible retractions or increased work of breathing.    SKIN: Visible skin clear. No significant rash, abnormal pigmentation or lesions.  NEURO: Cranial nerves grossly intact.  Mentation and speech appropriate for age.  PSYCH: Mentation appears normal, affect normal/bright, judgement and insight intact, normal speech and appearance well-groomed.                Video-Visit Details    Type of service:  Video Visit    Video End Time:11:06am    Originating Location (pt. Location): Home    Distant Location (provider location):  [facility]     Platform used for Video Visit: "[employer]

## 2021-11-08 NOTE — PATIENT INSTRUCTIONS
Dear Celia,    It was nice to see you today.    I have placed the referral for collaborative care.  Then you will resume visits with me after they have found a good regimen for you.    Take care,    Rissa Serrano MD  Internal Medicine/Pediatrics  [facility]

## 2021-11-09 ASSESSMENT — ANXIETY QUESTIONNAIRES: GAD7 TOTAL SCORE: 5

## 2021-11-09 ASSESSMENT — PATIENT HEALTH QUESTIONNAIRE - PHQ9: SUM OF ALL RESPONSES TO PHQ QUESTIONS 1-9: 8

## 2021-12-09 ASSESSMENT — PATIENT HEALTH QUESTIONNAIRE - PHQ9
SUM OF ALL RESPONSES TO PHQ QUESTIONS 1-9: 12
SUM OF ALL RESPONSES TO PHQ QUESTIONS 1-9: 12
10. IF YOU CHECKED OFF ANY PROBLEMS, HOW DIFFICULT HAVE THESE PROBLEMS MADE IT FOR YOU TO DO YOUR WORK, TAKE CARE OF THINGS AT HOME, OR GET ALONG WITH OTHER PEOPLE: VERY DIFFICULT

## 2021-12-09 ASSESSMENT — ANXIETY QUESTIONNAIRES
GAD7 TOTAL SCORE: 7
5. BEING SO RESTLESS THAT IT IS HARD TO SIT STILL: SEVERAL DAYS
3. WORRYING TOO MUCH ABOUT DIFFERENT THINGS: SEVERAL DAYS
7. FEELING AFRAID AS IF SOMETHING AWFUL MIGHT HAPPEN: SEVERAL DAYS
4. TROUBLE RELAXING: SEVERAL DAYS
7. FEELING AFRAID AS IF SOMETHING AWFUL MIGHT HAPPEN: SEVERAL DAYS
6. BECOMING EASILY ANNOYED OR IRRITABLE: SEVERAL DAYS
GAD7 TOTAL SCORE: 7
2. NOT BEING ABLE TO STOP OR CONTROL WORRYING: SEVERAL DAYS
GAD7 TOTAL SCORE: 7
1. FEELING NERVOUS, ANXIOUS, OR ON EDGE: SEVERAL DAYS

## 2021-12-09 NOTE — PROGRESS NOTES
DIAGNOSTIC PSYCHIATRIC ASSESSMENT     Name:  Celia Castro  : 1990     Telemedicine Visit: The patient's condition can be safely assessed and treated via synchronous audio/visual telemedicine encounter.      Reason for Telemedicine Visit: COVID 19 pandemic and the social and physical recommendations by the CDC and MD.      Originating Site (Patient Location): Patient's home; pt verified their location for the duration of this appointment as address on record.    Distant Site (Provider Location): Provider Remote Setting    Consent:  The patient/guardian has verbally consented to: the potential risks and benefits of telemedicine (video or phone) versus in-person care; bill insurance or make self-payment for services provided; and responsibility for payment of non-covered services.     Mode of Communication:  [employer] video platform     As the treating provider, I attest to compliance with applicable laws and regulations related to telemedicine.  Chart documentation may have been completed with Dragon Voice Recognition software.     IDENTIFICATION   Patient is a 31 year old year old White, female  who presents for intake and medication management with [facility].  Patient was referred by PCP. Patient attended the session alone.   The [facility] psychiatry providers act as a specialty service for Primary Care Providers in the [facility] who seek to optimize medications for unstable patients.  Once medications have been optimized, [facility] providers discharge the patient back to the referring Primary Care Provider for ongoing medication management.  This type of system allows [facility] to serve a high volume of patients.      Patient care team: Patient Care Team:  Rissa Serrano MD as PCP - General (Internal Medicine)  Rissa Serrano MD as Assigned PCP  Rema Valadez APRN CNP as Assigned OBGYN Provider  Vish Madrid MD as Assigned Surgical Provider  Therapist: None    Available records in Epic  "and/or Care Everywhere were reviewed today.     LANGUAGE OR COMMUNICATION BARRIERS   Are there language or communication issues or need for modification in treatment? No   Are there ethnic, cultural or Denominational factors that may be relevant for therapy? No  Client identified their preferred language to be fluent English in conversational context  Does the client need the assistance of an  or other support involved in therapy? No                                                 CHIEF COMPLAINT   Patient is a 31 year old,  White, female who presents for initial psychiatric evaluation. Pt was referred by their Primary Care Provider, Rissa Serrano MD to the [facility] ([facility]) for evaluation of depression.      HISTORY OF PRESENT ILLNESS     \"I feel like I'm at a point where the prozac alone isn't doing what I need it to.\" She works rotating shifts and notices that in the winter her mood always dips down and typically improves when the season changes and there's more daylight out in the spring.  Pt with depression \"officially\" since 2007 \"but probably before that.\" She has seen therapists in the past shortly after being diagnosed with depression but didn't have meaningful improvement with therapy. One of her therapists had her engage in EMDR for trauma while in college.    Pt describes problems with sleep at baseline. She also describes problems with low energy and low motivation that is worse in Winter but generally bad due to her rotating schedule, which is 2 weeks of days and 2 weeks of nights. She had a sleep study done about 10 years ago and worked with a sleep specialist then. The study showed restless sleep and difficulty getting into REM. She tried gabapentin with minimal effect. She will try a 0.25 piece of a THC gummy at bedtime \"that helps a little bit, maybe.\" On the weeks she has off (naturally occurring part of her schedule), she still finds that she has " "trouble getting tasks done/having motivation to take care of her responsibilities.    PSYCHIATRIC REVIEW OF SYSTEMS:     Depression:  Pt endorses depressive sx including low interest, feeling down, impaired sleep, low energy, impaired appetite, and low-self esteem. This has been going on for a few months. \"Summer was better as it usually is. Fall things started to get worse and now Winter things got even worse.\"   PHQ9 score is 12 indicating moderate depression.  Suicidal ideation:  Denies, No SI currently and endorses thoughts sometimes like \"I could drive my car off the road.\" She doesn't want to die but sometimes wants to take a week off from her life.    Anxiety: Pt endorses several days per week of anxious sx including feeling nervous, worrying about many things, feeling unable to control said worry, difficulty relaxing, feeling restless, feeling more irritable than usual, and being afraid that something bad might happen. Pt feels like anxiety is better controlled than depression but perceives anxiety is more \"butterflies in my stomach\" or \"general nerves.\"   GAD7 score is is 7 indicating mild anxiety.     Panic: Endorses history of panic attacks but years since last PA   Social anxiety: No symptoms  PTSD: Experienced or witnessed trauma including trauma early in college No current symptoms   Trauma history: Denies  OCD: Denies hx of obsessions or compulsions irresistible urges to do things repeatedly such as counting, washing hands, checking, etc. Denies hoarding.  No current symptoms  Mood lability:  Could not elicit true manic symptoms, extended periods of decreased need for sleep, extreme high level of energy, or grandiosity. Denies any symptoms consistent with hypomania.  No current symptoms  Psychosis: Denies thought disturbance symptoms or hx of AH, VH, TH, or OH. and Denies having periods of feeling others were plotting to harm them, people reading their mind, reading others mind, receiving special " messages from TV, computer, etc.   ADD / ADHD: Denies previous dx of ADHD prior to age 10.     Autism symptoms:  None  Eating Disorder: Denies concerns with weight or body image beyond normal concern.  Denies restricting or purging behaviors or excessive exercise for weight control.    SUBSTANCE USE HISTORY    Tobacco use: quit in 2015  Caffeine:  Yes  2+ cups/day of coffee, 1+ caffeinated bubbler water daily  Current alcohol:  Socially, 2-4 drinks/a few times per month  Current substance use: THC gummies most nights  Past use alcohol/substance use: None    PSYCHIATRIC HISTORY:   Past psychotropic medications:   Paroxetine  Buspirone  Bupropion - no perceived benefit  Alprazolam  Venlafaxine  Trazodone - helpful for sleep onset but not sleep maintenance    Past psychiatric diagnoses:   MDD, single episode, moderate  Anxiety  PTSD    Past psychiatric treatment:  Therapist/Psychologist: hx therapy in the past  History of Interventions: counseling and medication(s) from physician / PCP  History of Psychiatric Hospitalizations:   - Inpatient: Denies  - Residential: Denies  - IOP/PHP/Day treatment: Denies  History of Suicidal Ideation: Denies  History of Suicide Attempts:  No   History of Self-injurious Behavior: Denies a history of SIB.  Current:  No  History of Violence/Aggression: Denies  Feels safe in home: Yes   History of impulsivity: No   Firearms/Weapons Access: No: Patient denies  History of Commitment? None  Electroconvulsive Therapy (ECT) or Transcranial Magnetic Stimulation (TMS): None  Pharmacogenomic Testing (such as GeneSight): None    SOCIAL HISTORY                                         Born and raised in [place].  Parents   Siblings:  2  Childhood: Yes intact home with all current basic needs being met.  Developmental Milestones: no reported delays  Highest education level was college graduate.    Service: No  Relationship status: partnered  Children: none  Employment status: full  time NICU RN  Legal: none  Exercise: Jessica hooker  Taoist/Spirituality: not Yarsanism  Current stressors include: work, house problems  Supports: partner, siblings, pets, friends  Coping mechanisms: exercise  Hobbies:  Reading, board games, theater, movies  Current living situation: lives with partner    Patient Strengths & Room for Growth:   Celia Castro identified the following strengths or resources that may help she succeed in counseling: friends / good social support, insight, intelligence, motivation, strong social skills and work ethic.   Things that may interfere with the patient's success include:  none noted at this time.    MEDICATIONS                                                                                              Current medications reviewed today and are noted below.   Current psychotropic medications:   Fluoxetine 80mg - thinks she has been on it for a while  Lorazepam PRN    Supplements:   See below    [place] Prescription Monitoring Program   MKPSYPMP: [place] Prescription Monitoring Program [] review was not needed today.  [place] Prescription Monitoring Program [] was checked today:  indicates no controlled prescriptions reported in previous 5 months.    Current Outpatient Medications   Medication Sig     acyclovir (ZOVIRAX) 400 MG tablet Take 1 tablet (400 mg) by mouth daily , 1 tab twice daily during outbreak     cyclobenzaprine (FLEXERIL) 5 MG tablet Take 1-2 tablets (5-10 mg) by mouth 3 times daily as needed for muscle spasms     FLUoxetine (PROZAC) 40 MG capsule TAKE 2 CAPSULES BY MOUTH EVERY DAY     LORazepam (ATIVAN) 0.5 MG tablet Take 1 tablet (0.5 mg) by mouth every 8 hours as needed for anxiety     Multiple Vitamins-Minerals (MULTIVITAMIN ADULT PO)      ondansetron (ZOFRAN-ODT) 4 MG ODT tab TAKE 1-2 TABLETS BY MOUTH EVERY 8 HOURS AS NEEDED FOR NAUSEA     No current facility-administered medications for this visit.        VITALS   There were no vitals taken for this visit.  "    BP Readings from Last 1 Encounters:   05/19/21 128/72     Pulse Readings from Last 1 Encounters:   05/19/21 91     Wt Readings from Last 1 Encounters:   05/19/21 81.1 kg (178 lb 14.4 oz)     Ht Readings from Last 1 Encounters:   05/19/21 1.676 m (5' 6\")     Estimated body mass index is 28.88 kg/m  as calculated from the following:    Height as of 5/19/21: 1.676 m (5' 6\").    Weight as of 5/19/21: 81.1 kg (178 lb 14.4 oz).    LABS & IMAGING                                                                                                                Recent available labs were reviewed today.    No visits with results within 2 Month(s) from this visit.   Latest known visit with results is:   Orders Only on 03/23/2021   Component Date Value     Sodium 03/23/2021 138      Potassium 03/23/2021 3.9      Chloride 03/23/2021 106      Carbon Dioxide 03/23/2021 30      Anion Gap 03/23/2021 2*     Glucose 03/23/2021 90      Urea Nitrogen 03/23/2021 13      Creatinine 03/23/2021 0.81      GFR Estimate 03/23/2021 >90      GFR Estimate If Black 03/23/2021 >90      Calcium 03/23/2021 9.2      Bilirubin Total 03/23/2021 0.3      Albumin 03/23/2021 3.9      Protein Total 03/23/2021 7.5      Alkaline Phosphatase 03/23/2021 60      ALT 03/23/2021 19      AST 03/23/2021 11      Cholesterol 03/23/2021 206*     Triglycerides 03/23/2021 51      HDL Cholesterol 03/23/2021 73      LDL Cholesterol Calculat* 03/23/2021 123*     Non HDL Cholesterol 03/23/2021 133*       No lab results found.  Recent Labs   Lab Test 03/23/21  0953      POTASSIUM 3.9   CHLORIDE 106   CO2 30   GLC 90   OTTO 9.2   BUN 13   CR 0.81   GFRESTIMATED >90   ALBUMIN 3.9   PROTTOTAL 7.5   AST 11   ALT 19   ALKPHOS 60   BILITOTAL 0.3     Recent Labs   Lab Test 03/23/21  0953   CHOL 206*   *   HDL 73   TRIG 51     No lab results found.  25 OH Vit D2   Date Value Ref Range Status   02/16/2012 <5 ug/L Final     25 OH Vit D3   Date Value Ref Range Status "   02/16/2012 35 ug/L Final     25 OH Vit D total   Date Value Ref Range Status   02/16/2012  30 - 75 ug/L Final    <40  Season, race, dietary intake, and treatment affect the concentration of   25-hydroxy-Vitamin D. Values may decrease during winter months and increase   during summer months. Values less than 30 ug/L may indicate Vitamin D   deficiency.        ALLERGY & IMMUNIZATIONS     No Known Allergies    MEDICAL & SURGICAL HISTORY      Past Medical History:   Diagnosis Date     Major depressive disorder, single episode, moderate (H) 9/07     Past Surgical History:   Procedure Laterality Date     TONSILLECTOMY  2008      Seizures or Head Injury: Denies history of head injury. Denies history of seizures.    FAMILY MEDICAL AND PSYCHIATRIC HISTORY     Family History   Problem Relation Age of Onset     Diabetes Father      Hypertension Father      Bipolar Disorder Father 70     Breast Cancer Paternal Grandmother      Cancer - colorectal Paternal Grandfather      Breast Cancer Mother 60     Family history of sudden or unexplained death or an event requiring resuscitation in children or young adults, cardiac arrhythmias (eg, Jose-Parkinson-White syndrome), long QT syndrome, catecholaminergic paroxysmal ventricular tachycardia, Brugada syndrome, arrhythmogenic right ventricular dysplasia, hypertrophic cardiomyopathy, dilated cardiomyopathy, or Marfan syndrome?  No    Family psychiatric history: dad with BAD, PTSD; cousins with depression  Family substance use history:  ETOH problems in family  Family suicide history: No  Medications family responded to: Unknown     MEDICAL REVIEW OF SYSTEMS:   10 systems (general, cardiovascular, respiratory, eyes, ENT, endocrine, GI, , M/S, neurological) were reviewed. Most pertinent finding(s) is/are: denies. The remaining systems are all unremarkable.    MENTAL STATUS EXAM:     General/Constitutional:  Appearance: awake, alert, adequately groomed and appeared stated age    Attitude: cooperative   Eye Contact:  good  Musculoskeletal:  Muscle Strength and Tone: intact; wnl  Psychomotor Behavior:  no evidence of tardive dyskinesia, dystonia, or tics   Gait and Station: YANETH  Psychiatric:  Speech:  clear, coherent, normal rate, rhythm, volume, tone, prosody   Associations:  no loose associations  Thought Process:  logical, linear and goal oriented   Thought Content:  no evidence of suicidal ideation or homicidal ideation, no evidence of psychotic thought, no auditory hallucinations present and no visual hallucinations present   Mood:  depressed  Affect:  appropriate and in normal range and mood congruent  Insight:  good  Judgment:  intact, adequate for safety  Impulse Control:  intact  Neurological:  Oriented to: time, person, and place  Attention Span and Concentration: Normal  Language: intact  Recent and Remote Memory:  Intact to interview. Not formally assessed. No amnesia.  Fund of Knowledge: appropriate    RISK AND PROTECTIVE FACTORS     Static Risk Factors: history of MH diagnoses and/or treatment    Dynamic Risk Factors: insomnia, substance use, anxiety, mental health stigma and work related problems    Protective Factors: hope for the future, compliance with medication, problem solving ability, future oriented, healthy intimate relationships, perceived internal locus of control, access to care as needed, motivation and readiness for change, reasons for living, effective coping strategies and displaying help seeking behavior    SAFETY ASSESSMENT     Based on review of above risk and protective factors and today's exam, pt is not at elevated risk of harm to self or others. She does not meet criteria for a 72 hr hold and remains appropriate for ongoing outpatient care. The patient convincingly denies suicidality today. There was no deceit detected, and the patient presented in a manner that was believable. Local community safety resources printed and reviewed for patient to use if  needed.    Recommended that patient call 911 or go to the local ED should there be a change in any of these risk factors.    DSM 5 DIAGNOSIS     296.32 (F33.1) Major Depressive Disorder, Recurrent Episode, Moderate _ and With anxious distress   F33.8 Seasonal Affective Disorder    Differential diagnoses include: NA    ASSESSMENT AND PLAN      Assessment:  Celia Castro is a 31 year old White, female who presents for initial visit with [facility] ([facility]) for medication management. Pt with hx recurrent depressive episodes for over 10 years presenting with worsening depression since Fall and perceiving medication ineffectiveness. Pt notes years-long history of mood dipping in fall/winter and improving by spring. This cycle occurs regardless of depressive episodes but certainly magnifies depressive sx. She also experiences mild anxiety that is generally not impairing her functioning and uses lorazepam for on a less than monthly basis.   While pt does appear to meet criteria for a major depressive episode now, I suspect the seasonal affective component + her variable schedule are significantly contributing to her mood and insomnia problems. Recommended we intervene on sleep first due to impacts of sleep on mood, anxiety, emotion regulation, cognition. Recommended short-term zolpidem trial as she has previously found trazodone ineffective. We can consider quetiapine in the future if longer-term sleep aids are needed. Discussed repeat sleep study given ongoing sleep problems despite interventions from sleep specialist in the past + potential new treatments compared to last study over a decade ago. Discussed SAD lamp rx and daily use. Pt agreeable to try using SAD lamp daily.   We can consider augmenting with aripiprazole for mood next appt vs switching to another SSRI or SNRI vs even considering mirtazapine for sleep + mood + anxiety.    Treatment Plan: Medication side effects and alternatives  reviewed. Health promotion activities recommended and reviewed. All questions addressed. Education and counseling completed regarding risks and benefits of medications and psychotherapy options. [facility] Service model reviewed today. Recommend therapy for additional support. Safety plan reviewed as indicated.     Medications:   -continue fluoxetine 80mg daily  -continue lorazepam 0.5mg sparingly for anxiety if needed  -start zolpidem 5mg at bedtime PRN  -will advise dose of Vit D supplement following result of her lab    Labs:   -Vit D and TSH ordered to r/o other causes of depression  -sleep study referral placed    Psychosocial:   - will consider therapy in the future    Follow-up: Follow up in 4 weeks    1. Continue all other treatments (including medications) per primary care provider and/or specialists.   2. To schedule individual or family therapy, call [facility] at [phone].   3. Follow up with primary care provider as planned or for acute medical concerns.  4. Call the psychiatric nurse line with medication questions or concerns at [phone].  5. [employer] may be used to communicate with your care team, but this is not intended to be used for emergencies.    Crisis Resources:    1. Present to the Emergency Department as needed or call after hours crisis line at [phone] or [phone].   2. [place] Crisis Text [place]: Text [place] to 330589.  3. Suicide LifeLine Chat: suicidepreventionlifeline.org/chat/.  4. National Suicide Prevention Lifeline: 257.589.9046 (TTY: 261.841.8629). Call anytime for help.  (www.suicidepreventionlifeline.org)  5. National [place] [employer] Mental Illness (www.traci.org): 695.695.5733 or 031-358-3351.  6. Mental Health Association (www.mentalhealth.org): 134.288.3897 or 927-290-6500.      Administrative Billing:     Video/Phone Start Time:  1030  Video/Phone End Time:  1118    60 minutes spent on date of encounter reviewing pt record,  performing history and exam, documenting clinical information in EMR, providing education to pt, and ordering prescriptions, medications, and referrals as indicated above.     Patient Status:  Patient will continue to be seen for ongoing consultation and stabilization.    Signed:   Tristan Tavera DNP, [id number]  [facility] ([facility])

## 2021-12-10 ENCOUNTER — VIRTUAL VISIT (OUTPATIENT)
Dept: PSYCHIATRY | Facility: CLINIC | Age: 31
End: 2021-12-10
Payer: COMMERCIAL

## 2021-12-10 DIAGNOSIS — G47.00 PERSISTENT INSOMNIA: ICD-10-CM

## 2021-12-10 DIAGNOSIS — F33.8 SEASONAL AFFECTIVE DISORDER (H): Primary | ICD-10-CM

## 2021-12-10 DIAGNOSIS — F33.1 MAJOR DEPRESSIVE DISORDER, RECURRENT EPISODE, MODERATE (H): ICD-10-CM

## 2021-12-10 PROCEDURE — 99205 OFFICE O/P NEW HI 60 MIN: CPT | Mod: 95 | Performed by: STUDENT IN AN ORGANIZED HEALTH CARE EDUCATION/TRAINING PROGRAM

## 2021-12-10 RX ORDER — ZOLPIDEM TARTRATE 5 MG/1
5 TABLET ORAL
Qty: 30 TABLET | Refills: 0 | Status: SHIPPED | OUTPATIENT
Start: 2021-12-10 | End: 2022-08-29

## 2021-12-10 ASSESSMENT — ANXIETY QUESTIONNAIRES: GAD7 TOTAL SCORE: 7

## 2021-12-10 ASSESSMENT — PATIENT HEALTH QUESTIONNAIRE - PHQ9: SUM OF ALL RESPONSES TO PHQ QUESTIONS 1-9: 12

## 2021-12-10 NOTE — PATIENT INSTRUCTIONS
1. Call [facility] to schedule repeat sleep study.  2. Start zolpidem 5mg at bedtime if you need it for insomnia. Do not drink alcohol with this medicine.  3. Continue fluoxetine 80mg daily for mood/anxiety.  4. Continue lorazepam very sparingly if needed for anxiety.   5. I ordered for your Vit D and thyroid to be checked.  6. Please to any [employer] store to  your SAD light. Use this 20 mins per day first thing after you wake up.

## 2021-12-10 NOTE — PROGRESS NOTES
Celia is a 31 year old who is being evaluated via a billable video visit.      How would you like to obtain your AVS? MyChart  If the video visit is dropped, the invitation should be resent by: Text to cell phone: [phone]  Will anyone else be joining your video visit? No  Answers for HPI/ROS submitted by the patient on 12/9/2021  If you checked off any problems, how difficult have these problems made it for you to do your work, take care of things at home, or get along with other people?: Very difficult  PHQ9 TOTAL SCORE: 12  JUDAH 7 TOTAL SCORE: 7

## 2021-12-29 ENCOUNTER — LAB (OUTPATIENT)
Dept: LAB | Facility: CLINIC | Age: 31
End: 2021-12-29
Payer: COMMERCIAL

## 2021-12-29 DIAGNOSIS — F33.8 SEASONAL AFFECTIVE DISORDER (H): ICD-10-CM

## 2021-12-29 DIAGNOSIS — F33.1 MAJOR DEPRESSIVE DISORDER, RECURRENT EPISODE, MODERATE (H): ICD-10-CM

## 2021-12-29 LAB
DEPRECATED CALCIDIOL+CALCIFEROL SERPL-MC: 24 UG/L (ref 20–75)
TSH SERPL DL<=0.005 MIU/L-ACNC: 0.92 MU/L (ref 0.4–4)

## 2021-12-29 PROCEDURE — 84443 ASSAY THYROID STIM HORMONE: CPT

## 2021-12-29 PROCEDURE — 36415 COLL VENOUS BLD VENIPUNCTURE: CPT

## 2021-12-29 PROCEDURE — 82306 VITAMIN D 25 HYDROXY: CPT

## 2022-01-15 ENCOUNTER — HEALTH MAINTENANCE LETTER (OUTPATIENT)
Age: 32
End: 2022-01-15

## 2022-02-21 ENCOUNTER — VIRTUAL VISIT (OUTPATIENT)
Dept: SLEEP MEDICINE | Facility: CLINIC | Age: 32
End: 2022-02-21
Attending: STUDENT IN AN ORGANIZED HEALTH CARE EDUCATION/TRAINING PROGRAM
Payer: COMMERCIAL

## 2022-02-21 VITALS — WEIGHT: 185 LBS | BODY MASS INDEX: 29.73 KG/M2 | HEIGHT: 66 IN

## 2022-02-21 DIAGNOSIS — G47.26 CIRCADIAN RHYTHM SLEEP DISORDER, SHIFT WORK TYPE: ICD-10-CM

## 2022-02-21 DIAGNOSIS — R06.83 SNORING: ICD-10-CM

## 2022-02-21 DIAGNOSIS — G47.00 FREQUENT NOCTURNAL AWAKENING: ICD-10-CM

## 2022-02-21 DIAGNOSIS — G47.10 HYPERSOMNIA: Primary | ICD-10-CM

## 2022-02-21 DIAGNOSIS — R51.9 SLEEP RELATED HEADACHES: ICD-10-CM

## 2022-02-21 PROCEDURE — 99204 OFFICE O/P NEW MOD 45 MIN: CPT | Mod: 95 | Performed by: INTERNAL MEDICINE

## 2022-02-21 NOTE — PATIENT INSTRUCTIONS
"Shift Worker Recommendations    Individuals who work night shifts commonly experience difficulties with both sleep and alertness at desired times, and shift work is increasingly recognized as a risk factor for a variety of adverse health outcomes.  ?While some shift workers show circadian adjustment to their work schedule, many others do not. Up to one-third of shift workers report regular, persistent complaints of insomnia and/or excessive sleepiness that meet formal criteria for shift work disorder (SWD).   ?Shift workers generally have reduced total sleep time over a 24-hour period compared with non-shift workers, and they commonly report difficulty with sleep initiation and maintenance. Disturbances during wakefulness include excessive sleepiness, impaired cognitive function, decreased psychomotor functioning, and altered social and emotional functioning.   ?The evaluation of shift workers who complain of sleep or wake disturbances includes a comprehensive sleep history and risk assessment as well as an objective assessment of sleep-wake patterns. Sleep logs and actigraphy are the primary tools used to objectively determine sleep-wake patterns over an extended period (ideally two weeks).   ?Basic measures to improve daytime sleep after a night shift include sleeping at regular times each day if possible, use of light-blocking shades, and control of ambient noise. If family or social responsibilities prohibit one long sleep period, we encourage use of a regularized four-hour \"anchor\" sleep that takes priority first thing in the morning, and a second sleep period that can vary around other responsibilities.  ?For patients who have difficulty initiating daytime sleep despite optimal sleep hygiene and behavioral strategies to appropriately schedule sleep, options include a short-acting hypnotic agent, melatonin, and cognitive behavioral therapy. The choice among these should be individualized based on availability " and cost, presence of contraindications, and patient preference. Patients who choose hypnotic therapy should be cautioned about the risk of carry-over sedation effects into the next shift. Exogenous melatonin taken 30 minutes before desired sleep onset is an alternative that is associated with a lower risk of side effects.   ?Optimizing daytime sleep does not always help with sleepiness and function during the night shift, and additional measures may be needed, particularly in patients with circadian misalignment.   When feasible, scheduled brief naps (up to 45 minutes) before and during the night shift are a low-risk intervention that can improve alertness; caffeine intake during the shift can also help.     Patient education: What is a sleep study?     What is a sleep study? -- A sleep study is a test that measures how well you sleep and checks for sleep problems. For some sleep studies, you stay overnight in a sleep lab at a hospital or sleep center.     What happens during a sleep study? -- Before you go to sleep, a technician attaches small, sticky patches called  electrodes  to your head, chest, and legs. He or she will also place a small tube beneath your nose and might wrap 1 or 2 belts around your chest.   Each of these items has wires that connect to monitors. The monitors record your movement, brain activity, breathing, and other body functions while you sleep.  If you have a history of trouble falling asleep, your doctor might prescribe a medicine to help you fall asleep in the lab. If you have never taken the medicine before, your doctor might ask you take it on a night before your sleep study to see how it affects you.   Why might my doctor order a sleep study? -- Your doctor will order a sleep study if he or she thinks you have sleep apnea or a different condition that makes you:   ?Have sudden jerking leg movements while you sleep, called  periodic limb movements.    ?Feel very sleepy during the day  and fall asleep all of a sudden, called  narcolepsy.    ?Have trouble falling asleep or staying asleep over a long period of time, called  chronic insomnia.    ?Do odd things while you sleep, such as walking.  How should I prepare for a sleep study? -- On the day of your sleep study, you should:   ?Avoid alcohol   ?Avoid drinking coffee, tea, sodas, and other drinks that have caffeine in the afternoon and evening   ?Take all of your regular medicines     The cost of care estimate line is [phone]. They are able to give the patient an estimate of the charges and also an estimate of their insurance coverage/patient responsibility.   After your sleep study is performed, please call us at [phone] or [phone]  to schedule for a follow up to review the results of the sleep study.    Please bring one tab of low dose melatonin 3 mg or less to the night of the study.    Melatonin intake is completely voluntary.    You may take own melatonin after arrival to sleep center. Do not drive or operate machinery after intake of melatonin.

## 2022-02-21 NOTE — PROGRESS NOTES
Celia is a 31 year old who is being evaluated via a billable video visit.      How would you like to obtain your AVS? MyChart  If the video visit is dropped, the invitation should be resent by: Text to cell phone: [phone]  Will anyone else be joining your video visit? No    Video Start Time: 11:19 AM  Video-Visit Details    Type of service:  Video Visit    Video End Time:11:34 AM    Originating Location (pt. Location): Home    Distant Location (provider location):  [facility]     Platform used for Video Visit: [employer]/Ownza     Additional 15 minutes on the date of service was spent performing the following:    -Preparing to see the patient  -Obtaining and/or reviewing separately obtained history   -Ordering medications, tests, or procedures   -Documenting clinical information in the electronic or other health record     Thank you for the opportunity to participate in the care of  Celia Castro.    Assessment and Plan:    In summary Celia Castro is a 31 year old year old female here for sleep disturbance.  1. Hypersomnia/Snoring/Frequent nocturnal awakening/Sleep related headaches   Celia Castro has high risk for obstructive sleep apnea based on the history of hypersomnia, snoring and a crowded airway. I educated the patient on the underlying pathophysiology of obstructive sleep apnea. We reviewed the risks associated with sleep apnea, including increased cardiovascular risk and overall death. We talked about treatments briefly. I recommend getting an baseline nocturnal polysomnography. The patient should return to the clinic to discuss results and treatment option in a patient-centered approach.  2. Shift Working Syndrome  Educated the patient on the risk of developing cancer with long term shift working. Will give the patient a handout on how to survive shift working symptomatically.    Lab reviewed: Discussed with patient.    History of present illness:    She is a 31 year old female  who comes to the virtual clinic with a chief complaint of excessive daytime sleepiness has been going on for approximately 10 years.  While the patient denies any episodes of witnessed apnea she has been told in the past that she has snoring during sleep.  The patient also complains of multiple awakenings at night sometimes with a headache.  She also suffers from anxiety and depression which may affect her sleep.  Complicating matters further is the fact that she does shift working as well.     Ideal Sleep-Wake Cycle(devoid of societal pressure):    Patient would try to initiate sleep at around Midnight with a sleep latency of 30 minutes. The patient would have 5-6 awakenings. Final wake up time is around 10 AM.    Total score - [place]: 7 (2/8/2022  2:21 AM)    Patient told to return in one week after the sleep study is interpreted.    Patient Active Problem List   Diagnosis     CARDIOVASCULAR SCREENING; LDL GOAL LESS THAN 160     Anxiety     Moderate single current episode of major depressive disorder (H)     Past Medical History:   Diagnosis Date     Major depressive disorder, single episode, moderate (H) 9/07     Past Surgical History:   Procedure Laterality Date     TONSILLECTOMY  2008     Current Outpatient Medications   Medication Sig Dispense Refill     acyclovir (ZOVIRAX) 400 MG tablet Take 1 tablet (400 mg) by mouth daily , 1 tab twice daily during outbreak 90 tablet 3     FLUoxetine (PROZAC) 40 MG capsule TAKE 2 CAPSULES BY MOUTH EVERY  capsule 1     LORazepam (ATIVAN) 0.5 MG tablet Take 1 tablet (0.5 mg) by mouth every 8 hours as needed for anxiety 30 tablet 0     Multiple Vitamins-Minerals (MULTIVITAMIN ADULT PO)        ondansetron (ZOFRAN-ODT) 4 MG ODT tab TAKE 1-2 TABLETS BY MOUTH EVERY 8 HOURS AS NEEDED FOR NAUSEA 20 tablet 0     zolpidem (AMBIEN) 5 MG tablet Take 1 tablet (5 mg) by mouth nightly as needed for sleep 30 tablet 0     Patient has no known allergies.  Social History      Socioeconomic History     Marital status: Single     Spouse name: Not on file     Number of children: 0     Years of education: Not on file     Highest education level: Not on file   Occupational History     Employer: NONE    Tobacco Use     Smoking status: Former Smoker     Packs/day: 0.00     Years: 0.00     Pack years: 0.00     Quit date: 2014     Years since quittin.2     Smokeless tobacco: Never Used     Tobacco comment: socially    Substance and Sexual Activity     Alcohol use: Yes     Alcohol/week: 2.0 standard drinks     Comment: socailly      Drug use: No     Sexual activity: Yes     Partners: Male     Birth control/protection: Condom     Comment: Mirena 12/28/15   Other Topics Concern     Parent/sibling w/ CABG, MI or angioplasty before 65F 55M? No      Service Not Asked     Blood Transfusions Not Asked     Caffeine Concern Yes     Comment: 1 cup qd, 1 can qd on average     Occupational Exposure Not Asked     Hobby Hazards Not Asked     Sleep Concern Not Asked     Stress Concern Not Asked     Weight Concern Not Asked     Special Diet Not Asked     Back Care Not Asked     Exercise No     Bike Helmet Not Asked     Seat Belt Yes     Self-Exams Not Asked   Social History Narrative     Not on file     Social Determinants of Health     Financial Resource Strain: Not on file   Food Insecurity: Not on file   Transportation Needs: Not on file   Physical Activity: Not on file   Stress: Not on file   Social Connections: Not on file   Intimate Partner Violence: Not on file   Housing Stability: Not on file     Family History   Problem Relation Age of Onset     Breast Cancer Mother 60     Diabetes Father      Hypertension Father      Bipolar Disorder Father 70     Snoring Father      Breast Cancer Paternal Grandmother      Cancer - colorectal Paternal Grandfather         Physical Exam:  GEN: NAD,   Head: Normocephalic.  EYES: EOMI  ENT: Oropharynx is clear, [place] class 4+ airway.   Psych: normal  mood, normal affect  Snore test:( +)     Labs/Studies:     No results found for: PH, PHARTERIAL, PO2, [id number], SAT, PCO2, HCO3, BASEEXCESS, ELIZABETH, BEB  Lab Results   Component Value Date    TSH 0.92 12/29/2021    TSH 1.06 10/25/2012     Lab Results   Component Value Date    GLC 90 03/23/2021    GLC 80 04/06/2017     Lab Results   Component Value Date    HGB 12.4 10/25/2012    HGB 11.7 02/16/2012     Lab Results   Component Value Date    BUN 13 03/23/2021    BUN 9 10/25/2012    CR 0.81 03/23/2021    CR 0.74 10/25/2012     Lab Results   Component Value Date    AST 11 03/23/2021    AST 17 10/25/2012    ALT 19 03/23/2021    ALT 24 10/25/2012    ALKPHOS 60 03/23/2021    ALKPHOS 53 10/25/2012    BILITOTAL 0.3 03/23/2021    BILITOTAL 0.5 10/25/2012    BILICONJ 0.0 07/26/2010     No results found for: UAMP, UBARB, BENZODIAZEUR, UCANN, UCOC, OPIT, UPCP    Recent Labs   Lab Test 03/23/21  0953 04/06/17  1401     --    POTASSIUM 3.9  --    CHLORIDE 106  --    CO2 30  --    ANIONGAP 2*  --    GLC 90 80   BUN 13  --    CR 0.81  --    OTTO 9.2  --        Ferritin   Date Value Ref Range Status   10/05/2018 51 12 - 150 ng/mL Final       Danilo Toribio DO  Board Certified in Internal Medicine and Sleep Medicine    (Note created with Dragon voice recognition and unintended spelling errors and word substitutions may occur)

## 2022-02-22 NOTE — NURSING NOTE
Sleep study and return visit for results has been scheduled by central scheduling.  Sleep study information packet/letter send via MTailor.         Albert Smith KAVYA  [facility] [place]

## 2022-03-21 ENCOUNTER — OFFICE VISIT (OUTPATIENT)
Dept: MIDWIFE SERVICES | Facility: CLINIC | Age: 32
End: 2022-03-21
Payer: COMMERCIAL

## 2022-03-21 VITALS
TEMPERATURE: 97.4 F | DIASTOLIC BLOOD PRESSURE: 82 MMHG | SYSTOLIC BLOOD PRESSURE: 128 MMHG | WEIGHT: 186.1 LBS | OXYGEN SATURATION: 98 % | BODY MASS INDEX: 30.04 KG/M2 | HEART RATE: 89 BPM

## 2022-03-21 DIAGNOSIS — B00.9 HSV (HERPES SIMPLEX VIRUS) INFECTION: Primary | ICD-10-CM

## 2022-03-21 DIAGNOSIS — N90.89 LABIAL LESION: ICD-10-CM

## 2022-03-21 PROCEDURE — 99203 OFFICE O/P NEW LOW 30 MIN: CPT | Performed by: ADVANCED PRACTICE MIDWIFE

## 2022-03-21 PROCEDURE — 36415 COLL VENOUS BLD VENIPUNCTURE: CPT | Performed by: ADVANCED PRACTICE MIDWIFE

## 2022-03-21 PROCEDURE — 86780 TREPONEMA PALLIDUM: CPT | Performed by: ADVANCED PRACTICE MIDWIFE

## 2022-03-21 PROCEDURE — 87529 HSV DNA AMP PROBE: CPT | Performed by: ADVANCED PRACTICE MIDWIFE

## 2022-03-21 RX ORDER — FAMCICLOVIR 250 MG/1
250 TABLET ORAL DAILY
Qty: 10 TABLET | Refills: 0 | Status: SHIPPED | OUTPATIENT
Start: 2022-03-21 | End: 2022-06-09

## 2022-03-21 RX ORDER — FAMCICLOVIR 500 MG/1
1000 TABLET ORAL 2 TIMES DAILY
Qty: 4 TABLET | Refills: 0 | Status: SHIPPED | OUTPATIENT
Start: 2022-03-21 | End: 2022-06-09

## 2022-03-21 NOTE — PROGRESS NOTES
S: Celia is here for evaluation of vaginal/perineal lesion. She had a herpes outbreak in 2019 and since that time, these lesions have remained. She has tried PO valcyclovir, as well as PO and topical acyclovir, without resolution. They do not hurt, but they do not heal and resolve. She has not had IC in 2 years, since the initial outbreak.     O: /82   Pulse 89   Temp 97.4  F (36.3  C)   Wt 84.4 kg (186 lb 1.6 oz)   LMP 03/07/2022 (Exact Date)   SpO2 98%   Breastfeeding No   BMI 30.04 kg/m    Exam:  Constitutional: healthy, alert and no distress  Psychiatric: mentation appears normal and affect normal/bright  Pelvic Exam:  Vulva: No external lesions, normal hair distribution, no adenopathy  Vagina: Moist, pink, no abnormal discharge, well rugated; 3 small pimple sized papules in triangle arrangement just inside introitus.   HSV culture: pending  Treponema: pending    A/P:  (B00.9) HSV (herpes simplex virus) infection  (primary encounter diagnosis)  Plan: famciclovir (FAMVIR) 250 MG TABS tablet,         famciclovir (FAMVIR) 500 MG tablet    (N90.89) Labial lesion  Plan: Treponema Abs w Reflex to RPR and Titer, HSV         Culture and Typing ([facility])  Will trial Famvir for resolution. If culture is negative and famvir is not effective, will consider infectious disease referral.    Rodrigue Tejeda CNM

## 2022-03-22 LAB
HSV1 DNA SPEC QL NAA+PROBE: NOT DETECTED
HSV2 DNA SPEC QL NAA+PROBE: NOT DETECTED
T PALLIDUM AB SER QL: NONREACTIVE

## 2022-04-12 ENCOUNTER — E-VISIT (OUTPATIENT)
Dept: MIDWIFE SERVICES | Facility: CLINIC | Age: 32
End: 2022-04-12
Payer: COMMERCIAL

## 2022-04-12 DIAGNOSIS — B00.9 HSV (HERPES SIMPLEX VIRUS) INFECTION: Primary | ICD-10-CM

## 2022-04-12 DIAGNOSIS — B00.9 HSV-2 INFECTION: ICD-10-CM

## 2022-04-12 PROCEDURE — 99421 OL DIG E/M SVC 5-10 MIN: CPT | Performed by: ADVANCED PRACTICE MIDWIFE

## 2022-04-13 RX ORDER — ACYCLOVIR 400 MG/1
400 TABLET ORAL DAILY
Qty: 90 TABLET | Refills: 3 | Status: SHIPPED | OUTPATIENT
Start: 2022-04-13 | End: 2023-04-14

## 2022-04-13 NOTE — PATIENT INSTRUCTIONS
Thank you for choosing us for your care. I have placed an order for a prescription so that you can start treatment. View your full visit summary for details by clicking on the link below. Your pharmacist will able to address any questions you may have about the medication.     If you re not feeling better within 2-3 days, please schedule an appointment.  You can schedule an appointment right here in [facility], or call [phone]  If the visit is for the same symptoms as your eVisit, we ll refund the cost of your eVisit if seen within seven days.    I reordered acyclovir for you. During an outbreak you should take 2 tablets a day. Once this is over you can take one tablet a day to prevent future outbreaks. You should schedule a clinic appointment in order to have a long term plan and prescription for outbreak prevention. If you symptoms do not improve with the medication I would like you to come for a clinic visit for an examination.

## 2022-04-13 NOTE — TELEPHONE ENCOUNTER
Acyclovir reordered for treatment of HSV based on current symptomology and hx of HSV. If symptoms do not improve she should schedule a clinic visit.   Vandana Shelton CNM, ALEXANDER CNM  . Provider E-Visit time total (minutes): 10 minutes

## 2022-05-02 ENCOUNTER — TELEPHONE (OUTPATIENT)
Dept: MIDWIFE SERVICES | Facility: CLINIC | Age: 32
End: 2022-05-02
Payer: COMMERCIAL

## 2022-05-02 ENCOUNTER — THERAPY VISIT (OUTPATIENT)
Dept: SLEEP MEDICINE | Facility: CLINIC | Age: 32
End: 2022-05-02
Payer: COMMERCIAL

## 2022-05-02 DIAGNOSIS — G47.10 HYPERSOMNIA: ICD-10-CM

## 2022-05-02 DIAGNOSIS — R06.83 SNORING: ICD-10-CM

## 2022-05-02 DIAGNOSIS — R51.9 SLEEP RELATED HEADACHES: ICD-10-CM

## 2022-05-02 DIAGNOSIS — G47.00 FREQUENT NOCTURNAL AWAKENING: ICD-10-CM

## 2022-05-02 PROCEDURE — 95810 POLYSOM 6/> YRS 4/> PARAM: CPT | Performed by: INTERNAL MEDICINE

## 2022-05-02 NOTE — TELEPHONE ENCOUNTER
TC to Celia. She is on my schedule today for recheck for recurrent/unresolved HSV symptoms. Discussed her situation with Dr. La, and her recommendation would be for Celia to see one of the MDs in our practice who could perform a vulvar biopsy and try to figure out why she is having these recurrent symptoms. She is in agreement with this plan. Plans to reschedule via Real Time [employer].  Rodrigue Tejeda CNM

## 2022-05-03 NOTE — PATIENT INSTRUCTIONS
[facility]    1. Your sleep study will be reviewed by a sleep physician within the next few days.     2. Please follow up in the sleep clinic as scheduled, or, make an appointment with your sleep provider to be seen within two weeks to discuss the results of the sleep study.    3. If you have any questions or problems with your treatment plan, please contact your sleep clinic provider at [phone] to further manage your condition.    4. Please review your attached medication list, and, at your follow-up appointment advise your sleep clinic provider about any changes.    5. Go to [email or web address] for more information about your sleep problems.    Kristina Pinto,   May 3, 2022

## 2022-05-05 ENCOUNTER — TELEPHONE (OUTPATIENT)
Dept: SLEEP MEDICINE | Facility: CLINIC | Age: 32
End: 2022-05-05
Payer: COMMERCIAL

## 2022-05-05 NOTE — TELEPHONE ENCOUNTER
Overnight polysomnography was reviewed.   The patient had snoring but did not rule in for obstructive sleep apnea. Please call the patient to offer cancellation of follow up. Thank you.

## 2022-05-06 LAB — SLPCOMP: NORMAL

## 2022-05-06 NOTE — TELEPHONE ENCOUNTER
Called patient per Dr. Granda request to discuss possible cancellation of follow-up visit as patients PSG did not show SUZE. Patient has been advised to call back if she would like to cancel appointment. If patient opts to keep appointment patient will not call back.     Sandhya Reveles RN on 5/6/2022 at 9:14 AM

## 2022-05-18 ENCOUNTER — OFFICE VISIT (OUTPATIENT)
Dept: OBGYN | Facility: CLINIC | Age: 32
End: 2022-05-18
Payer: COMMERCIAL

## 2022-05-18 VITALS
SYSTOLIC BLOOD PRESSURE: 110 MMHG | BODY MASS INDEX: 30.18 KG/M2 | OXYGEN SATURATION: 95 % | HEART RATE: 85 BPM | WEIGHT: 187 LBS | DIASTOLIC BLOOD PRESSURE: 73 MMHG

## 2022-05-18 DIAGNOSIS — N90.89 VULVAL LESION: Primary | ICD-10-CM

## 2022-05-18 PROCEDURE — 88305 TISSUE EXAM BY PATHOLOGIST: CPT | Performed by: PATHOLOGY

## 2022-05-18 PROCEDURE — 99213 OFFICE O/P EST LOW 20 MIN: CPT | Mod: 25 | Performed by: OBSTETRICS & GYNECOLOGY

## 2022-05-18 PROCEDURE — 11420 EXC H-F-NK-SP B9+MARG 0.5/<: CPT | Performed by: OBSTETRICS & GYNECOLOGY

## 2022-05-18 ASSESSMENT — PATIENT HEALTH QUESTIONNAIRE - PHQ9: SUM OF ALL RESPONSES TO PHQ QUESTIONS 1-9: 10

## 2022-05-19 ENCOUNTER — VIRTUAL VISIT (OUTPATIENT)
Dept: SLEEP MEDICINE | Facility: CLINIC | Age: 32
End: 2022-05-19
Payer: COMMERCIAL

## 2022-05-19 VITALS — HEIGHT: 66 IN | WEIGHT: 187 LBS | BODY MASS INDEX: 30.05 KG/M2

## 2022-05-19 DIAGNOSIS — G47.9 SLEEP DISTURBANCE: Primary | ICD-10-CM

## 2022-05-19 PROCEDURE — 99213 OFFICE O/P EST LOW 20 MIN: CPT | Mod: 95 | Performed by: INTERNAL MEDICINE

## 2022-05-19 ASSESSMENT — SLEEP AND FATIGUE QUESTIONNAIRES
HOW LIKELY ARE YOU TO NOD OFF OR FALL ASLEEP IN A CAR, WHILE STOPPED FOR A FEW MINUTES IN TRAFFIC: WOULD NEVER DOZE
HOW LIKELY ARE YOU TO NOD OFF OR FALL ASLEEP WHILE SITTING QUIETLY AFTER LUNCH WITHOUT ALCOHOL: WOULD NEVER DOZE
HOW LIKELY ARE YOU TO NOD OFF OR FALL ASLEEP WHILE SITTING INACTIVE IN A PUBLIC PLACE: WOULD NEVER DOZE
HOW LIKELY ARE YOU TO NOD OFF OR FALL ASLEEP WHILE SITTING AND READING: SLIGHT CHANCE OF DOZING
HOW LIKELY ARE YOU TO NOD OFF OR FALL ASLEEP WHILE WATCHING TV: MODERATE CHANCE OF DOZING
HOW LIKELY ARE YOU TO NOD OFF OR FALL ASLEEP WHILE LYING DOWN TO REST IN THE AFTERNOON WHEN CIRCUMSTANCES PERMIT: HIGH CHANCE OF DOZING
HOW LIKELY ARE YOU TO NOD OFF OR FALL ASLEEP WHILE SITTING AND TALKING TO SOMEONE: WOULD NEVER DOZE
HOW LIKELY ARE YOU TO NOD OFF OR FALL ASLEEP WHEN YOU ARE A PASSENGER IN A CAR FOR AN HOUR WITHOUT A BREAK: HIGH CHANCE OF DOZING

## 2022-05-19 NOTE — PROGRESS NOTES
Celia is a 31 year old who is being evaluated via a billable video visit.      How would you like to obtain your AVS? MyChart  If the video visit is dropped, the invitation should be resent by: Send to e-mail at: [email or web address]  Will anyone else be joining your video visit? No    Video Start Time: 7:24 AM  Video-Visit Details    Type of service:  Video Visit    Video End Time:7:32 AM    Originating Location (pt. Location): Home    Distant Location (provider location):  [facility]     Platform used for Video Visit: [employer]PalsWell     Additional 10 minutes on the date of service was spent performing the following:    -Preparing to see the patient (eg, review of tests)   -Ordering medications, tests, or procedures   -Documenting clinical information in the electronic or other health record     Thank you for the opportunity to participate in the care of Celia Castro.     She is a 31 year old  female patient who comes to the sleep medicine clinic for review of sleep study results. The study was completed on 05/02/22  which showed the study was unremarkable. The patient does admit that she slept better at our facility compared to at home.    Assessment and Plan:  In summary Celia Castro is a 31 year old year old female here for review of sleep study results.  1. Sleep disturbance  I reviewed the results of the patient's sleep study in detail. I informed her that we were not able to find anything obvious that could be disturbing her sleep. I recommend that she contemplate what could be disturbing her sleep at home. I welcomed her to follow up with me as needed.    JOYCE:  JOYCE Total Score: 16  Total score - [place]: 9 (5/19/2022  1:49 AM)    Patient Active Problem List   Diagnosis     CARDIOVASCULAR SCREENING; LDL GOAL LESS THAN 160     Anxiety     Moderate single current episode of major depressive disorder (H)       Current Outpatient Medications   Medication Sig Dispense Refill     acyclovir  (ZOVIRAX) 400 MG tablet Take 1 tablet (400 mg) by mouth daily , 1 tab twice daily during outbreak 90 tablet 3     famciclovir (FAMVIR) 250 MG TABS tablet Take 1 tablet (250 mg) by mouth daily for 10 days 10 tablet 0     famciclovir (FAMVIR) 500 MG tablet Take 2 tablets (1,000 mg) by mouth 2 times daily for 1 day 4 tablet 0     FLUoxetine (PROZAC) 40 MG capsule TAKE 2 CAPSULES BY MOUTH EVERY  capsule 1     LORazepam (ATIVAN) 0.5 MG tablet Take 1 tablet (0.5 mg) by mouth every 8 hours as needed for anxiety 30 tablet 0     Multiple Vitamins-Minerals (MULTIVITAMIN ADULT PO)        ondansetron (ZOFRAN-ODT) 4 MG ODT tab TAKE 1-2 TABLETS BY MOUTH EVERY 8 HOURS AS NEEDED FOR NAUSEA 20 tablet 0     zolpidem (AMBIEN) 5 MG tablet Take 1 tablet (5 mg) by mouth nightly as needed for sleep 30 tablet 0       No Known Allergies    Physical Exam:  GEN: NAD  Psych: normal mood, normal affect     Labs/Studies:  - We reviewed the results of the overnight study as described on the HPI.       Patient verbalized understanding of these issues, agrees with the plan and all questions were answered today. Patient was given an opportuntity to voice any other symptoms or concerns not listed above. Patient did not have any other symptoms or concerns.      Danilo Toribio DO  Board Certified in Internal Medicine and Sleep Medicine      (Note created with Dragon voice recognition and unintended spelling errors and word substitutions may occur)

## 2022-05-24 LAB
PATH REPORT.COMMENTS IMP SPEC: NORMAL
PATH REPORT.COMMENTS IMP SPEC: NORMAL
PATH REPORT.FINAL DX SPEC: NORMAL
PATH REPORT.GROSS SPEC: NORMAL
PATH REPORT.MICROSCOPIC SPEC OTHER STN: NORMAL
PATH REPORT.RELEVANT HX SPEC: NORMAL
PHOTO IMAGE: NORMAL

## 2022-05-24 NOTE — PROGRESS NOTES
"  Assessment & Plan     Vulval lesion  Discussed could be scar tissue from prior outbreak  Discussed continued observation versus biopsy/removal. Would like removal.  Discussed removing lesion does not exclude intermittent shedding of HSV viral particles.   On daily suppression. Discussed partner testing to see if concordant, she will discuss with him again. Partner could also go on presumptive pharmacoprophylaxis.   - Surgical Pathology Exam  - BIOPSY VULVA/PERINEUM, ONE LESION    Procedure:  Vulvar Biopsy  PARQ was held and consents signed.  The patient was positioned in dorsal lithotomy.  The biopsy site was cleansed with betadine and injected with 1% plain lidocaine.  A 3 mm punch biopsy was taken at 6:00 on the posterior fourchette of the vulva.  Hemostasis was achieved with silver nitrate.  The patient tolerated the procedure well.      Ordering of each unique test   Review of results of tests: HSV testing         BMI:   Estimated body mass index is 30.18 kg/m  as calculated from the following:    Height as of 2/21/22: 1.676 m (5' 6\").    Weight as of this encounter: 84.8 kg (187 lb).           No follow-ups on file.    Anabella La MD  [facility] is a 31 year old who presents for the following health issues     HPI   Presents for evaluation of vulvar lesion  Present since 2019 at first recognized HSV outbreak. Has maybe had other outbreaks in this same location, but this lesion has never resolved.   Avoids vaginal intercourse due to it.   Partner has not had serology testing - needle phobia  Recently saw Rodrigue Tejeda CNM and swab PCR was negative.     Past Medical History:   Diagnosis Date     Major depressive disorder, single episode, moderate (H) 9/07       Past Surgical History:   Procedure Laterality Date     TONSILLECTOMY  2008       Family History   Problem Relation Age of Onset     Breast Cancer Mother 60     Diabetes Father      Hypertension " Father      Bipolar Disorder Father 70     Snoring Father      Breast Cancer Paternal Grandmother      Cancer - colorectal Paternal Grandfather        Social History     Socioeconomic History     Marital status: Single     Spouse name: Not on file     Number of children: 0     Years of education: Not on file     Highest education level: Not on file   Occupational History     Employer: NONE    Tobacco Use     Smoking status: Former Smoker     Packs/day: 0.00     Years: 0.00     Pack years: 0.00     Quit date: 2014     Years since quittin.5     Smokeless tobacco: Never Used     Tobacco comment: socially    Substance and Sexual Activity     Alcohol use: Yes     Alcohol/week: 2.0 standard drinks     Comment: socailly      Drug use: No     Sexual activity: Yes     Partners: Male     Birth control/protection: Condom     Comment: Mirena 12/28/15   Other Topics Concern     Parent/sibling w/ CABG, MI or angioplasty before 65F 55M? No      Service Not Asked     Blood Transfusions Not Asked     Caffeine Concern Yes     Comment: 1 cup qd, 1 can qd on average     Occupational Exposure Not Asked     Hobby Hazards Not Asked     Sleep Concern Not Asked     Stress Concern Not Asked     Weight Concern Not Asked     Special Diet Not Asked     Back Care Not Asked     Exercise No     Bike Helmet Not Asked     Seat Belt Yes     Self-Exams Not Asked   Social History Narrative     Not on file     Social Determinants of Health     Financial Resource Strain: Not on file   Food Insecurity: Not on file   Transportation Needs: Not on file   Physical Activity: Not on file   Stress: Not on file   Social Connections: Not on file   Intimate Partner Violence: Not on file   Housing Stability: Not on file       Current Outpatient Medications   Medication     acyclovir (ZOVIRAX) 400 MG tablet     FLUoxetine (PROZAC) 40 MG capsule     LORazepam (ATIVAN) 0.5 MG tablet     Multiple Vitamins-Minerals (MULTIVITAMIN ADULT PO)      ondansetron (ZOFRAN-ODT) 4 MG ODT tab     zolpidem (AMBIEN) 5 MG tablet     famciclovir (FAMVIR) 250 MG TABS tablet     famciclovir (FAMVIR) 500 MG tablet     No current facility-administered medications for this visit.        No Known Allergies      Review of Systems   Constitutional, HEENT, cardiovascular, pulmonary, gi and gu systems are negative, except as otherwise noted.      Objective    /73   Pulse 85   Wt 84.8 kg (187 lb)   LMP 05/13/2022   SpO2 95%   BMI 30.18 kg/m    Body mass index is 30.18 kg/m .  Physical Exam   GENERAL: healthy, alert and no distress   (female): normal female external genitalia, normal urethral meatus, vaginal mucosa, normal cervix/adnexa/uterus without masses or discharge. Small bleb at 6:00 at posterior fourchette, about 2 mm. Patient identifies this as the lesion.   MS: no gross musculoskeletal defects noted, no edema  PSYCH: mentation appears normal, affect normal/bright        Component      Latest Ref Rng & Units 12/16/2016 9/13/2019 3/21/2022   Herpes Simplex Virus Type 1 IgG      0.0 - 0.8 AI <0.2 . . . 2.7 (H)    Herpes Simplex Virus Type 2 IgG      0.0 - 0.8 AI <0.2 . . . <0.2    HSV Type 1 PCR      Not Detected   Not Detected   HSV Type 2 PCR      Not Detected   Not Detected

## 2022-06-09 ENCOUNTER — VIRTUAL VISIT (OUTPATIENT)
Dept: FAMILY MEDICINE | Facility: CLINIC | Age: 32
End: 2022-06-09
Payer: COMMERCIAL

## 2022-06-09 DIAGNOSIS — H81.10 BENIGN PAROXYSMAL POSITIONAL VERTIGO, UNSPECIFIED LATERALITY: Primary | ICD-10-CM

## 2022-06-09 PROCEDURE — 99213 OFFICE O/P EST LOW 20 MIN: CPT | Mod: GT | Performed by: PHYSICIAN ASSISTANT

## 2022-06-09 NOTE — PROGRESS NOTES
Celia is a 31 year old who is being evaluated via a billable video visit.      How would you like to obtain your AVS? MyChart  If the video visit is dropped, the invitation should be resent by: Text to cell phone: [phone]  Will anyone else be joining your video visit? No      Video Start Time: 4:40 PM    Assessment & Plan       ICD-10-CM    1. Benign paroxysmal positional vertigo, unspecified laterality  H81.10    Talk to patient about her concerns through video visit at this point it appears to be a benign positional vertigo.  We talked about conservative management.  She can use over-the-counter meclizine as needed.  Encouraged her to do slow head movements and push lots of fluids.  Warning signs were discussed if her symptoms persist over the next week she should follow-up with regular provider for in clinic exam appear    Return for recheck.    Zack Corley PA-C  [facility]   Celia is a 31 year old who presents for the following health issues  accompanied by her self.    History of Present Illness       She eats 2-3 servings of fruits and vegetables daily.She consumes 3 sweetened beverage(s) daily.She exercises with enough effort to increase her heart rate 20 to 29 minutes per day.  She exercises with enough effort to increase her heart rate 3 or less days per week.   She is taking medications regularly.       Concern - Dizzy  Onset: This morning  Description: Dizzy  Intensity: moderate  Progression of Symptoms:  same  Accompanying Signs & Symptoms: Dizzy  Previous history of similar problem: when younger  Precipitating factors:        Worsened by: any movement  Alleviating factors:        Improved by: resting  Therapies tried and outcome:  none   No recent illness.   No medication changes.   She does struggle with seasonal allergies but nothing out of the ordinary for her.  She denies nausea vomiting diarrhea any chest pain shortness of breath headaches  dizziness or any visual changes.  She states her symptoms are worse when she moves her head around where she feels like the room is spinning.    Works as RN at [employer].       Review of Systems   Constitutional, HEENT, cardiovascular, pulmonary, gi and gu systems are negative, except as otherwise noted.      Objective           Vitals:  No vitals were obtained today due to virtual visit.    Physical Exam   GENERAL: Healthy, alert and no distress  EYES: Eyes grossly normal to inspection.  No discharge or erythema, or obvious scleral/conjunctival abnormalities.  RESP: No audible wheeze, cough, or visible cyanosis.  No visible retractions or increased work of breathing.    SKIN: Visible skin clear. No significant rash, abnormal pigmentation or lesions.  NEURO: Cranial nerves grossly intact.  Mentation and speech appropriate for age.  PSYCH: Mentation appears normal, affect normal/bright, judgement and insight intact, normal speech and appearance well-groomed.        Video-Visit Details    Type of service:  Video Visit    Video End Time:4:49 PM    Originating Location (pt. Location): Home    Distant Location (provider location):  [facility]     Platform used for Video Visit: [employer]

## 2022-08-16 ENCOUNTER — LAB REQUISITION (OUTPATIENT)
Dept: LAB | Facility: CLINIC | Age: 32
End: 2022-08-16

## 2022-08-16 LAB — SARS-COV-2 RNA RESP QL NAA+PROBE: NEGATIVE

## 2022-08-16 PROCEDURE — U0005 INFEC AGEN DETEC AMPLI PROBE: HCPCS | Performed by: INTERNAL MEDICINE

## 2022-08-20 DIAGNOSIS — F32.1 MODERATE SINGLE CURRENT EPISODE OF MAJOR DEPRESSIVE DISORDER (H): ICD-10-CM

## 2022-08-22 RX ORDER — FLUOXETINE 40 MG/1
CAPSULE ORAL
Qty: 180 CAPSULE | Refills: 1 | OUTPATIENT
Start: 2022-08-22

## 2022-08-22 NOTE — TELEPHONE ENCOUNTER
Routing refill request to provider for review/approval because:  A break in medication  Patient needs to be seen because:  due for 6 month follow up  Abnormal phq9    PHQ-9 score:    PHQ 5/18/2022   PHQ-9 Total Score 10   Q9: Thoughts of better off dead/self-harm past 2 weeks Not at all   F/U: Thoughts of suicide or self-harm -   F/U: Safety concerns -     Charlene Grey RN on 8/22/2022 at 11:18 AM

## 2022-08-22 NOTE — TELEPHONE ENCOUNTER
Patient was being followed by psychiatry.    If she is returning to me for mental health, please advise appt.  Video ok.    No refill until appt.  Patient can contact psychiatry if needed.    Rissa Serrano MD  Internal Medicine/Pediatrics  [facility]

## 2022-08-23 RX ORDER — FLUOXETINE 40 MG/1
CAPSULE ORAL
Qty: 60 CAPSULE | Refills: 0 | Status: SHIPPED | OUTPATIENT
Start: 2022-08-23 | End: 2022-08-29

## 2022-08-23 NOTE — TELEPHONE ENCOUNTER
Appt scheduled. Pt is wondering if she can just enough refill to last until her appointment that scheduled 08/29/2022. Dose and pharmacy verified with Pt.  Mis HOUGH, KELTON,ALFONSO

## 2022-08-23 NOTE — TELEPHONE ENCOUNTER
30 day refill sent - pls update patient.    Rissa Serrano MD  Internal Medicine/Pediatrics  [facility]

## 2022-08-25 ASSESSMENT — ANXIETY QUESTIONNAIRES
1. FEELING NERVOUS, ANXIOUS, OR ON EDGE: SEVERAL DAYS
6. BECOMING EASILY ANNOYED OR IRRITABLE: SEVERAL DAYS
GAD7 TOTAL SCORE: 4
2. NOT BEING ABLE TO STOP OR CONTROL WORRYING: NOT AT ALL
GAD7 TOTAL SCORE: 4
4. TROUBLE RELAXING: SEVERAL DAYS
8. IF YOU CHECKED OFF ANY PROBLEMS, HOW DIFFICULT HAVE THESE MADE IT FOR YOU TO DO YOUR WORK, TAKE CARE OF THINGS AT HOME, OR GET ALONG WITH OTHER PEOPLE?: SOMEWHAT DIFFICULT
3. WORRYING TOO MUCH ABOUT DIFFERENT THINGS: NOT AT ALL
5. BEING SO RESTLESS THAT IT IS HARD TO SIT STILL: SEVERAL DAYS
GAD7 TOTAL SCORE: 4
IF YOU CHECKED OFF ANY PROBLEMS ON THIS QUESTIONNAIRE, HOW DIFFICULT HAVE THESE PROBLEMS MADE IT FOR YOU TO DO YOUR WORK, TAKE CARE OF THINGS AT HOME, OR GET ALONG WITH OTHER PEOPLE: SOMEWHAT DIFFICULT
7. FEELING AFRAID AS IF SOMETHING AWFUL MIGHT HAPPEN: NOT AT ALL
7. FEELING AFRAID AS IF SOMETHING AWFUL MIGHT HAPPEN: NOT AT ALL

## 2022-08-29 ENCOUNTER — VIRTUAL VISIT (OUTPATIENT)
Dept: PEDIATRICS | Facility: CLINIC | Age: 32
End: 2022-08-29
Payer: COMMERCIAL

## 2022-08-29 DIAGNOSIS — R11.0 NAUSEA: ICD-10-CM

## 2022-08-29 DIAGNOSIS — F41.0 PANIC ATTACK: ICD-10-CM

## 2022-08-29 DIAGNOSIS — F32.1 MODERATE SINGLE CURRENT EPISODE OF MAJOR DEPRESSIVE DISORDER (H): ICD-10-CM

## 2022-08-29 PROCEDURE — 99214 OFFICE O/P EST MOD 30 MIN: CPT | Mod: 95 | Performed by: PEDIATRICS

## 2022-08-29 RX ORDER — ONDANSETRON 4 MG/1
TABLET, ORALLY DISINTEGRATING ORAL
Qty: 30 TABLET | Refills: 3 | Status: SHIPPED | OUTPATIENT
Start: 2022-08-29 | End: 2024-06-18

## 2022-08-29 RX ORDER — LORAZEPAM 0.5 MG/1
0.5 TABLET ORAL EVERY 8 HOURS PRN
Qty: 30 TABLET | Refills: 0 | Status: SHIPPED | OUTPATIENT
Start: 2022-08-29 | End: 2023-07-13

## 2022-08-29 RX ORDER — FLUOXETINE 40 MG/1
CAPSULE ORAL
Qty: 180 CAPSULE | Refills: 1 | Status: SHIPPED | OUTPATIENT
Start: 2022-08-29 | End: 2023-04-25

## 2022-08-29 ASSESSMENT — ANXIETY QUESTIONNAIRES: GAD7 TOTAL SCORE: 4

## 2022-08-29 NOTE — PATIENT INSTRUCTIONS
Dear Celia,    I am so glad you will be working days!    I have refilled your medications.      Let's plan for a follow up in 6 months.    If things are not going well over the winter - please contact collaborative care directly:([phone])    Rissa Serrano MD  Internal Medicine/Pediatrics  [facility]

## 2022-08-29 NOTE — PROGRESS NOTES
"Celia is a 31 year old who is being evaluated via a billable video visit.      How would you like to obtain your AVS? MyChart  If the video visit is dropped, the invitation should be resent by: EPIC  Will anyone else be joining your video visit? No        Assessment & Plan     Panic attack  Appropriate use - refill  - LORazepam (ATIVAN) 0.5 MG tablet; Take 1 tablet (0.5 mg) by mouth every 8 hours as needed for anxiety    Moderate single current episode of major depressive disorder (H)  Controlled right now - advised to restart vitamin D 2000 international unit(s) daily, continue SAD lamp  - FLUoxetine (PROZAC) 40 MG capsule; TAKE 2 CAPSULES BY MOUTH EVERY DAY    Nausea  Refill - get nausea with severe anxiety sometimes.   - ondansetron (ZOFRAN ODT) 4 MG ODT tab; TAKE 1-2 TABLETS BY MOUTH EVERY 8 HOURS AS NEEDED FOR NAUSEA             BMI:   Estimated body mass index is 30.18 kg/m  as calculated from the following:    Height as of 5/19/22: 1.676 m (5' 6\").    Weight as of 5/19/22: 84.8 kg (187 lb).       Patient Instructions   Dear Celia,    I am so glad you will be working days!    I have refilled your medications.      Let's plan for a follow up in 6 months.    If things are not going well over the winter - please contact collaborative care directly:([phone])    Rissa Serrano MD  Internal Medicine/Pediatrics  [facility]            Return in about 6 months (around 2/28/2023) for Routine preventive, with me, in person.    Rissa Serrano MD  [facility]    Salome Yang is a 31 year old, presenting for the following health issues:  No chief complaint on file.      History of Present Illness       Mental Health Follow-up:  Patient presents to follow-up on Depression & Anxiety.Patient's depression since last visit has been:  Medium  The patient is having other symptoms associated with depression.  Patient's anxiety since last visit has been:  Medium  The patient is having " other symptoms associated with anxiety.  Any significant life events: No  Patient is not feeling anxious or having panic attacks.  Patient has no concerns about alcohol or drug use.    She eats 2-3 servings of fruits and vegetables daily.She consumes 2 sweetened beverage(s) daily.She exercises with enough effort to increase her heart rate 30 to 60 minutes per day.  She exercises with enough effort to increase her heart rate 3 or less days per week.   She is taking medications regularly.    Today's PHQ-9         PHQ-9 Total Score: 8    PHQ-9 Q9 Thoughts of better off dead/self-harm past 2 weeks :   Not at all    How difficult have these problems made it for you to do your work, take care of things at home, or get along with other people: Somewhat difficult  Today's JUDAH-7 Score: 4     Currently taking prozac 80mg daily, prn ativan and ambien at night.     Last visit 11/2021 - patient was to meet with collaborative care.     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 Once she gets started she can see it through, hard to get started. Still on prozac 80mg and has used about five ativan since prescribed (once every couple weeks). She is interested in seeing psychiatry now as we discussed at her last visit due to her side effects from many medications.    She did meet with them 12/2021 - at that time they continue her on prozac 80mg, prn ativan and started her on ambien.  Also advised Vit D and sleep study. Sleep study - unremarkable.     Also prescribed happy light by psychiatry.     Summer is usually ok, Now on straight days at work.     Infrequent use of ativan 1-2 times per month        Review of Systems         Objective            Vitals:  No vitals were obtained today due to virtual visit.    Physical Exam   GENERAL: Healthy, alert and no distress  EYES: Eyes grossly normal to inspection.  No discharge or erythema, or obvious scleral/conjunctival abnormalities.  RESP: No audible wheeze, cough, or visible cyanosis.  No visible retractions or increased work of breathing.    SKIN: Visible skin clear. No significant rash, abnormal pigmentation or lesions.  NEURO: Cranial nerves grossly intact.  Mentation and speech appropriate for age.  PSYCH: Mentation appears normal, affect normal/bright, judgement and insight intact, normal speech and appearance well-groomed.                Video-Visit Details    Video Start Time: 9:30am    Type of service:  Video Visit    Video End Time:9:40 AM    Originating Location (pt. Location): Home    Distant Location (provider location):  [facility]     Platform used for Video Visit: [employer]  ..

## 2023-04-13 DIAGNOSIS — B00.9 HSV-2 INFECTION: ICD-10-CM

## 2023-04-14 RX ORDER — ACYCLOVIR 400 MG/1
400 TABLET ORAL DAILY
Qty: 60 TABLET | Refills: 0 | Status: SHIPPED | OUTPATIENT
Start: 2023-04-14 | End: 2023-07-24

## 2023-04-14 NOTE — TELEPHONE ENCOUNTER
"Requested Prescriptions   Pending Prescriptions Disp Refills     acyclovir (ZOVIRAX) 400 MG tablet [Pharmacy Med Name: ACYCLOVIR 400 MG TABLET] 60 tablet 5     Sig: TAKE 1 TABLET (400 MG) BY MOUTH DAILY , 1 TAB TWICE DAILY DURING OUTBREAK       Antivirals for Herpes Protocol Failed - 4/13/2023  7:08 PM        Failed - Recent (12 mo) or future (30 days) visit within the authorizing provider's specialty     Patient has had an office visit with the authorizing provider or a provider within the authorizing providers department within the previous 12 mos or has a future within next 30 days. See \"Patient Info\" tab in inbasket, or \"Choose Columns\" in Meds & Orders section of the refill encounter.              Failed - Normal serum creatinine on file in past 12 months     Recent Labs   Lab Test 03/23/21  0953   CR 0.81       Ok to refill medication if creatinine is low          Passed - Patient is age 12 or older        Passed - Medication is active on med list           Last Written Prescription Date:  4/13/22  Last Fill Quantity: 90,  # refills: 3   Last office visit: 3/21/2022 ; with prescribing provider:  Rodrigue Tejeda  Future Office Visit:    None    Medication is being filled for 1 time refill only due to:  Patient needs to be seen because it has been more than one year since last visit.   Jen Dover RN on 4/14/2023 at 1:37 PM          "

## 2023-04-23 ENCOUNTER — HEALTH MAINTENANCE LETTER (OUTPATIENT)
Age: 33
End: 2023-04-23

## 2023-07-10 ENCOUNTER — OFFICE VISIT (OUTPATIENT)
Dept: MIDWIFE SERVICES | Facility: CLINIC | Age: 33
End: 2023-07-10
Payer: COMMERCIAL

## 2023-07-10 VITALS
HEART RATE: 84 BPM | OXYGEN SATURATION: 98 % | WEIGHT: 199.2 LBS | SYSTOLIC BLOOD PRESSURE: 119 MMHG | BODY MASS INDEX: 32.15 KG/M2 | DIASTOLIC BLOOD PRESSURE: 84 MMHG

## 2023-07-10 DIAGNOSIS — Z30.430 ENCOUNTER FOR IUD INSERTION: Primary | ICD-10-CM

## 2023-07-10 DIAGNOSIS — Z30.430 ENCOUNTER FOR INSERTION OF INTRAUTERINE CONTRACEPTIVE DEVICE: ICD-10-CM

## 2023-07-10 DIAGNOSIS — Z11.3 SCREEN FOR STD (SEXUALLY TRANSMITTED DISEASE): ICD-10-CM

## 2023-07-10 LAB — HCG UR QL: NEGATIVE

## 2023-07-10 PROCEDURE — 87591 N.GONORRHOEAE DNA AMP PROB: CPT | Performed by: ADVANCED PRACTICE MIDWIFE

## 2023-07-10 PROCEDURE — 58300 INSERT INTRAUTERINE DEVICE: CPT | Performed by: ADVANCED PRACTICE MIDWIFE

## 2023-07-10 PROCEDURE — 81025 URINE PREGNANCY TEST: CPT | Performed by: ADVANCED PRACTICE MIDWIFE

## 2023-07-10 PROCEDURE — 87491 CHLMYD TRACH DNA AMP PROBE: CPT | Performed by: ADVANCED PRACTICE MIDWIFE

## 2023-07-10 NOTE — PROGRESS NOTES
IUD Insertion:  CONSULT:    Is a pregnancy test required: Yes.  Was it positive or negative?  Negative  Was a consent obtained?  Yes    Subjective: Celia Castro is a 32 year old  presents for IUD and desires Kyleena type IUD. She had the Mirena previously. During placement she reports it was very hard for them to get the IUD in, it took three attempts (at the same visit) because her internal os was not opening enough for it. We discussed the difference between the Mirena and Kyleena if she desired to try the Kyleena since it was smaller. After our discussion she felt like that was a good option for her. Discussed risks, benefits, and what to expect afterwards. No other questions or concerns. Pap smear is up to date, last done  with NIL pap and negative HPV, due every 5 years.     Patient has been given the opportunity to ask questions about all forms of birth control, including all options appropriate for Celia Castro. Discussed that no method of birth control, except abstinence is 100% effective against pregnancy or sexually transmitted infection.     Celia Castro understands she may have the IUD removed at any time. IUD should be removed by a health care provider.    The entire insertion procedure was reviewed with the patient, including care after placement.    Patient's last menstrual period was 2023. Has current contraception. No allergy to betadine or shellfish. Patient desires STD screening  No results found for: HCG      /84 (BP Location: Left arm, Patient Position: Sitting, Cuff Size: Adult Large)   Pulse 84   Wt 90.4 kg (199 lb 3.2 oz)   LMP 2023   SpO2 98%   BMI 32.15 kg/m      Pelvic Exam:   EG/BUS: normal genital architecture without lesions, erythema or abnormal secretions.   Vagina: moist, pink, rugae with physiologic discharge and secretions  Cervix: nulli parous no lesions and pink, moist, closed, without lesion or CMT  Uterus: anteverted position, mobile, no  pain  Adnexa: within normal limits and no masses, nodularity, tenderness    PROCEDURE NOTE: -- IUD Insertion    Reason for Insertion: contraception    Under sterile technique, cervix was visualized with speculum and prepped with Betadine solution swab x 3. Tenaculum was placed for stability. The uterus was gently straightened and sounded to 7.0 cm. IUD prepared for placement, and IUD inserted according to 's instructions without difficulty or significant resitance, and deployed at the fundus. The strings were visualized and trimmed to 3.0 cm from the external os. Tenaculum was removed and hemostasis noted. Speculum removed.  Patient tolerated procedure well.    Lot # [id number]  Exp: 11/01/2024    EBL: minimal    Complications: none    ASSESSMENT:     ICD-10-CM    1. Encounter for IUD insertion  Z30.430 HCG Qual, Urine ([id number])      2. Encounter for insertion of intrauterine contraceptive device  Z30.430            PLAN:    Given 's handouts, including when to have IUD removed, list of danger s/sx, side effects and follow up recommended. Encouraged condom use for prevention of STD. Back up contraception advised for 7 days if progestin method. Advised to call for any fever, for prolonged or severe pain or bleeding, abnormal vaginal discharge, or unable to palpate strings. She was advised to use pain medications (ibuprofen) as needed for mild to moderate pain. Advised to follow-up in clinic in 4-6 weeks for IUD string check if unable to find strings or as directed by provider.     ALEXANDER Ochoa CNM

## 2023-07-10 NOTE — PROGRESS NOTES
"  IUD Insertion:  CONSULT:    Is a pregnancy test required: {Pregnancy test required:944144}  Was a consent obtained?  {Yes/No:952962::\"Yes\"}    Subjective: Celia Castro is a 32 year old  presents for IUD and desires {OB IUD TYPE:443266} type IUD.    Patient has been given the opportunity to ask questions about all forms of birth control, including all options appropriate for Celia Castro. Discussed that no method of birth control, except abstinence is 100% effective against pregnancy or sexually transmitted infection.     Celia Castro understands she may have the IUD removed at any time. IUD should be removed by a health care provider.    The entire insertion procedure was reviewed with the patient, including care after placement.    Patient's last menstrual period was 2023. {last sex (Optional):568041}. No allergy to betadine or shellfish. {std screenin}  hCG Urine Qualitative   Date Value Ref Range Status   07/10/2023 Negative Negative Final     Comment:     This test is for screening purposes.  Results should be interpreted along with the clinical picture.  Confirmation testing is available if warranted by ordering [id number], HCG Quantitative Pregnancy.         /84 (BP Location: Left arm, Patient Position: Sitting, Cuff Size: Adult Large)   Pulse 84   Wt 90.4 kg (199 lb 3.2 oz)   LMP 2023   SpO2 98%   BMI 32.15 kg/m      Pelvic Exam:   EG/BUS: normal genital architecture without lesions, erythema or abnormal secretions.   Vagina: moist, pink, rugae with physiologic discharge and secretions  Cervix: ***parous no lesions and pink, moist, closed, without lesion or CMT  Uterus: ***position, mobile, no pain  Adnexa: within normal limits and no masses, nodularity, tenderness    PROCEDURE NOTE: -- IUD Insertion    Reason for Insertion: {IUD reasons:886021::\"contraception\"}    {IUD pain:542020}  Under sterile technique, cervix was visualized with speculum and prepped with {cleansing " "agents:885536::\"Betadine\"} solution swab x 3. {IUD instruments:019048::\"Tenaculum\"} was placed for stability. The uterus was gently straightened and sounded to {IUD SOUNDING DEPTHS:660033} cm. IUD prepared for placement, and IUD inserted according to 's instructions without difficulty or significant resitance, and deployed at the fundus. The strings were visualized and trimmed to {IUD SOUNDING DEPTHS:372921} cm from the external os. Tenaculum was removed and hemostasis noted. Speculum removed.  Patient tolerated procedure well.    Lot # ***  Exp: ***    EBL: minimal    Complications: none    ASSESSMENT:     ICD-10-CM    1. Encounter for IUD insertion  Z30.430 HCG Qual, Urine ([id number])      2. Encounter for insertion of intrauterine contraceptive device  Z30.430            PLAN:    Given 's handouts, including when to have IUD removed, list of danger s/sx, side effects and follow up recommended. Encouraged condom use for prevention of STD. Back up contraception advised for 7 days if progestin method. Advised to call for any fever, for prolonged or severe pain or bleeding, abnormal vaginal discharge, or unable to palpate strings. She was advised to use pain medications (ibuprofen) as needed for mild to moderate pain. Advised to follow-up in clinic in 4-6 weeks for IUD string check if unable to find strings or as directed by provider.     ALEXANDER Ochoa CNM    "

## 2023-07-10 NOTE — PROGRESS NOTES
"  IUD Insertion:  CONSULT:    Is a pregnancy test required: {Pregnancy test required:839396}  Was a consent obtained?  {Yes/No:147476::\"Yes\"}    Subjective: Celia Castro is a 32 year old  presents for IUD and desires {OB IUD TYPE:480290} type IUD.    Patient has been given the opportunity to ask questions about all forms of birth control, including all options appropriate for Celia Castro. Discussed that no method of birth control, except abstinence is 100% effective against pregnancy or sexually transmitted infection.     Celia Castro understands she may have the IUD removed at any time. IUD should be removed by a health care provider.    The entire insertion procedure was reviewed with the patient, including care after placement.    Patient's last menstrual period was 2023. {last sex (Optional):200824}. No allergy to betadine or shellfish. {std screenin}  hCG Urine Qualitative   Date Value Ref Range Status   07/10/2023 Negative Negative Final     Comment:     This test is for screening purposes.  Results should be interpreted along with the clinical picture.  Confirmation testing is available if warranted by ordering [id number], HCG Quantitative Pregnancy.         /84 (BP Location: Left arm, Patient Position: Sitting, Cuff Size: Adult Large)   Pulse 84   Wt 90.4 kg (199 lb 3.2 oz)   LMP 2023   SpO2 98%   BMI 32.15 kg/m      Pelvic Exam:   EG/BUS: normal genital architecture without lesions, erythema or abnormal secretions.   Vagina: moist, pink, rugae with physiologic discharge and secretions  Cervix: ***parous no lesions and pink, moist, closed, without lesion or CMT  Uterus: ***position, mobile, no pain  Adnexa: within normal limits and no masses, nodularity, tenderness    PROCEDURE NOTE: -- IUD Insertion    Reason for Insertion: {IUD reasons:378421::\"contraception\"}    {IUD pain:750831}  Under sterile technique, cervix was visualized with speculum and prepped with {cleansing " "agents:704099::\"Betadine\"} solution swab x 3. {IUD instruments:219696::\"Tenaculum\"} was placed for stability. The uterus was gently straightened and sounded to {IUD SOUNDING DEPTHS:063681} cm. IUD prepared for placement, and IUD inserted according to 's instructions without difficulty or significant resitance, and deployed at the fundus. The strings were visualized and trimmed to {IUD SOUNDING DEPTHS:481968} cm from the external os. Tenaculum was removed and hemostasis noted. Speculum removed.  Patient tolerated procedure well.    Lot # ***  Exp: ***    EBL: minimal    Complications: none    ASSESSMENT:     ICD-10-CM    1. Encounter for IUD insertion  Z30.430 HCG Qual, Urine ([id number])      2. Encounter for insertion of intrauterine contraceptive device  Z30.430 levonorgestrel (KYLEENA) 19.5 MG IUD     DISCONTINUED: levonorgestrel (KYLEENA) 19.5 MG IUD 1 each     CANCELED: INSERTION INTRAUTERINE DEVICE      3. Screen for STD (sexually transmitted disease)  Z11.3 CANCELED: NEISSERIA GONORRHOEA PCR     CANCELED: CHLAMYDIA TRACHOMATIS PCR           PLAN:    Given 's handouts, including when to have IUD removed, list of danger s/sx, side effects and follow up recommended. Encouraged condom use for prevention of STD. Back up contraception advised for 7 days if progestin method. Advised to call for any fever, for prolonged or severe pain or bleeding, abnormal vaginal discharge, or unable to palpate strings. She was advised to use pain medications (ibuprofen) as needed for mild to moderate pain. Advised to follow-up in clinic in 4-6 weeks for IUD string check if unable to find strings or as directed by provider.     ALEXANDER Ochoa CNM    "

## 2023-07-11 LAB
C TRACH DNA SPEC QL NAA+PROBE: NEGATIVE
N GONORRHOEA DNA SPEC QL NAA+PROBE: NEGATIVE

## 2023-07-13 ENCOUNTER — OFFICE VISIT (OUTPATIENT)
Dept: INTERNAL MEDICINE | Facility: CLINIC | Age: 33
End: 2023-07-13
Payer: COMMERCIAL

## 2023-07-13 VITALS
OXYGEN SATURATION: 98 % | DIASTOLIC BLOOD PRESSURE: 90 MMHG | HEIGHT: 66 IN | BODY MASS INDEX: 31.48 KG/M2 | WEIGHT: 195.9 LBS | SYSTOLIC BLOOD PRESSURE: 122 MMHG | HEART RATE: 74 BPM

## 2023-07-13 DIAGNOSIS — F41.0 PANIC ATTACK: ICD-10-CM

## 2023-07-13 DIAGNOSIS — F32.1 MODERATE SINGLE CURRENT EPISODE OF MAJOR DEPRESSIVE DISORDER (H): ICD-10-CM

## 2023-07-13 PROCEDURE — 99214 OFFICE O/P EST MOD 30 MIN: CPT | Performed by: NURSE PRACTITIONER

## 2023-07-13 RX ORDER — LORAZEPAM 0.5 MG/1
0.5 TABLET ORAL EVERY 8 HOURS PRN
Qty: 30 TABLET | Refills: 0 | Status: SHIPPED | OUTPATIENT
Start: 2023-07-13 | End: 2024-06-19

## 2023-07-13 RX ORDER — FLUOXETINE 40 MG/1
80 CAPSULE ORAL DAILY
Qty: 60 CAPSULE | Refills: 3 | Status: SHIPPED | OUTPATIENT
Start: 2023-07-13 | End: 2023-11-22

## 2023-07-13 ASSESSMENT — ANXIETY QUESTIONNAIRES
6. BECOMING EASILY ANNOYED OR IRRITABLE: SEVERAL DAYS
GAD7 TOTAL SCORE: 4
GAD7 TOTAL SCORE: 4
7. FEELING AFRAID AS IF SOMETHING AWFUL MIGHT HAPPEN: NOT AT ALL
2. NOT BEING ABLE TO STOP OR CONTROL WORRYING: NOT AT ALL
5. BEING SO RESTLESS THAT IT IS HARD TO SIT STILL: SEVERAL DAYS
IF YOU CHECKED OFF ANY PROBLEMS ON THIS QUESTIONNAIRE, HOW DIFFICULT HAVE THESE PROBLEMS MADE IT FOR YOU TO DO YOUR WORK, TAKE CARE OF THINGS AT HOME, OR GET ALONG WITH OTHER PEOPLE: SOMEWHAT DIFFICULT
3. WORRYING TOO MUCH ABOUT DIFFERENT THINGS: NOT AT ALL
1. FEELING NERVOUS, ANXIOUS, OR ON EDGE: SEVERAL DAYS

## 2023-07-13 ASSESSMENT — PATIENT HEALTH QUESTIONNAIRE - PHQ9
5. POOR APPETITE OR OVEREATING: SEVERAL DAYS
SUM OF ALL RESPONSES TO PHQ QUESTIONS 1-9: 7

## 2023-07-13 NOTE — PROGRESS NOTES
S: Celia Castro is a 32 year old female here to establish care and obtain a refill for her Prozac 40 mg 2 tabs daily.  She was prescribed this by Dr. Rissa Serrano at [facility]. This location is closer to her home.  She thinks she might have Seasonal Affective Disorder.  She has not acquired a light to treat this with yet.     She has an IUD since 7/10/23.    Her PHQ 9 was: 7  Her JUDAH 7 was: 4       Patient Active Problem List   Diagnosis     CARDIOVASCULAR SCREENING; LDL GOAL LESS THAN 160     Anxiety     Moderate single current episode of major depressive disorder (H)            Past Medical History:   Diagnosis Date     Major depressive disorder, single episode, moderate (H)             Past Surgical History:   Procedure Laterality Date     TONSILLECTOMY              Social History     Tobacco Use     Smoking status: Former     Packs/day: 0.00     Years: 0.00     Pack years: 0.00     Types: Cigarettes     Quit date: 2014     Years since quittin.6     Smokeless tobacco: Never     Tobacco comments:     socially    Substance Use Topics     Alcohol use: Yes     Alcohol/week: 2.0 standard drinks of alcohol     Comment: socailly             Family History   Problem Relation Age of Onset     Breast Cancer Mother 60     Diabetes Father      Hypertension Father      Bipolar Disorder Father 70     Snoring Father      Breast Cancer Paternal Grandmother      Cancer - colorectal Paternal Grandfather              No Known Allergies         Current Outpatient Medications   Medication Sig Dispense Refill     acyclovir (ZOVIRAX) 400 MG tablet TAKE 1 TABLET (400 MG) BY MOUTH DAILY , 1 TAB TWICE DAILY DURING OUTBREAK 60 tablet 0     FLUoxetine (PROZAC) 40 MG capsule TAKE 2 CAPSULES BY MOUTH EVERY DAY 60 capsule 0     levonorgestrel (KYLEENA) 19.5 MG IUD 1 each by Intrauterine route once       levonorgestrel (KYLEENA) 19.5 MG IUD 1 each by Intrauterine route once       LORazepam (ATIVAN) 0.5 MG tablet  "Take 1 tablet (0.5 mg) by mouth every 8 hours as needed for anxiety 30 tablet 0     Multiple Vitamins-Minerals (MULTIVITAMIN ADULT PO)        ondansetron (ZOFRAN ODT) 4 MG ODT tab TAKE 1-2 TABLETS BY MOUTH EVERY 8 HOURS AS NEEDED FOR NAUSEA 30 tablet 3       REVIEW OF SYSTEMS:  See above.    O:   BP (!) 122/90 (BP Location: Right arm, Patient Position: Sitting, Cuff Size: Adult Regular)   Pulse 74   Ht 1.676 m (5' 5.98\")   Wt 88.9 kg (195 lb 14.4 oz)   LMP 06/24/2023   SpO2 98%   BMI 31.63 kg/m    GENERAL APPEARANCE: healthy, alert and no distress  RESP: lungs clear to auscultation - no rales, rhonchi or wheezes  CV: regular rates and rhythm, normal S1 S2, no S3 or S4 and no murmur, click or rub   NEURO: alert and oriented.  Good historian.  MUSK: ambulatory.  SKIN: nl  PSYCHE: normal.    A/P:    Celia was seen today for recheck.    Diagnoses and all orders for this visit:    Moderate single current episode of major depressive disorder (H)  -     FLUoxetine (PROZAC) 40 MG capsule; Take 2 capsules (80 mg) by mouth daily    Panic attack  -     LORazepam (ATIVAN) 0.5 MG tablet; Take 1 tablet (0.5 mg) by mouth every 8 hours as needed for anxiety      The patient voiced understanding of the information discussed and all questions were answered.     I spent a total of 25 minutes in the care of this pt during today's office visit. This time includes reviewing the patient's chart and prior history, obtaining a history, performing an examination and evaluation and counseling the patient. This time also includes ordering medications or tests necessary in addition to communication to other member's of the patient's health care team. Time spent in documentation and care coordination is included.     Waleska MUNOZ, FABIAN                  "

## 2023-07-13 NOTE — PATIENT INSTRUCTIONS
Obtain Twinrix vaccine now and in 4-6 months to complete vaccine series.       Waleska Guerrero APRSALVATORE, CNP

## 2023-07-13 NOTE — NURSING NOTE
Celia Castro is a 32 year old female patient that presents today in clinic for the following:    Chief Complaint   Patient presents with     RECHECK     Medication refills     The patient's allergies and medications were reviewed as noted. A set of vitals were recorded as noted without incident. The patient does not have any other questions for the provider.    Dru Mccloud, EMT at 1:30 PM on 7/13/2023

## 2023-07-24 DIAGNOSIS — B00.9 HSV-2 INFECTION: ICD-10-CM

## 2023-07-24 RX ORDER — ACYCLOVIR 400 MG/1
400 TABLET ORAL DAILY
Qty: 90 TABLET | Refills: 1 | Status: SHIPPED | OUTPATIENT
Start: 2023-07-24 | End: 2024-04-18

## 2023-11-19 DIAGNOSIS — F32.1 MODERATE SINGLE CURRENT EPISODE OF MAJOR DEPRESSIVE DISORDER (H): ICD-10-CM

## 2023-11-22 RX ORDER — FLUOXETINE 40 MG/1
80 CAPSULE ORAL DAILY
Qty: 60 CAPSULE | Refills: 2 | Status: SHIPPED | OUTPATIENT
Start: 2023-11-22 | End: 2024-04-24

## 2023-11-22 NOTE — TELEPHONE ENCOUNTER
FLUoxetine (PROZAC) 40 MG capsule 60 capsule 3 7/13/2023  Last Office Visit : 7/13/2023   Future Office visit:  0    Routing refill request to provider for review/approval because:  FYI: Please note last PHQ-9 per protocol:  7. Refilled for 90 days, due for Follow-up appt January.  PHQ-9 score:        7/13/2023     1:55 PM   PHQ   PHQ-9 Total Score 7   Q9: Thoughts of better off dead/self-harm past 2 weeks Not at all

## 2024-04-12 DIAGNOSIS — F32.1 MODERATE SINGLE CURRENT EPISODE OF MAJOR DEPRESSIVE DISORDER (H): ICD-10-CM

## 2024-04-17 DIAGNOSIS — B00.9 HSV-2 INFECTION: ICD-10-CM

## 2024-04-18 RX ORDER — ACYCLOVIR 400 MG/1
400 TABLET ORAL DAILY
Qty: 30 TABLET | Refills: 2 | Status: SHIPPED | OUTPATIENT
Start: 2024-04-18 | End: 2024-07-23

## 2024-04-18 NOTE — TELEPHONE ENCOUNTER
Requested Prescriptions   Pending Prescriptions Disp Refills    acyclovir (ZOVIRAX) 400 MG tablet [Pharmacy Med Name: ACYCLOVIR 400 MG TABLET] 30 tablet 5     Sig: TAKE 1 TABLET (400 MG) BY MOUTH DAILY , 1 TAB TWICE DAILY DURING OUTBREAK       Antivirals Passed - 4/17/2024  8:31 PM        Passed - Patient is age 12 or older        Passed - Medication is active on med list        Passed - Recent (12 mo) or future (90 days) visit within the authorizing provider's specialty     The patient must have completed an in-person or virtual visit within the past 12 months or has a future visit scheduled within the next 90 days with the authorizing provider s specialty.  Urgent care and e-visits do not quality as an office visit for this protocol.          Passed - Medication indicated for associated diagnosis     Medication is associated with one or more of the following diagnoses:     Genital herpes simplex   Herpes simples   Herpes zoster, shingles   Varicella   Recurrent herpes simplex labialis   HIV infection   Varicella-zoster virus infection, prophylaxis             Last Written Prescription Date:  7/24/23  Last Fill Quantity: 90,  # refills: 1   Last office visit: 7/10/2023 ; last virtual visit: Visit date not found with prescribing provider:  Alberta Brink CNM   Future Office Visit:    None    Prescription approved per [facility] Refill Protocol.  Jen Dover RN on 4/18/2024 at 8:49 AM

## 2024-04-22 NOTE — TELEPHONE ENCOUNTER
FLUoxetine (PROZAC) 40 MG capsule 60 capsule 2 11/22/2023       Last Office Visit: 7/13/23  Future Office visit:   none      SSRIs Protocol [id number]/12/2024 02:45 PM   Protocol Details PHQ-9 score less than 5 in past 6 months    Recent (6 mo) or future (30 days) visit within the authorizing provider's specialty   PHQ-9 Total Score 7/13/23--7    Routing refill request to provider for review/approval because:  OVERDUE APPT-REMINDER SENT WITH LAST REFILL TO SCHEDULE   OVERDUE PHQ-9    Reyna Mccormack RN  P Red Flag Triage/MRT

## 2024-04-24 RX ORDER — FLUOXETINE 40 MG/1
80 CAPSULE ORAL DAILY
Qty: 180 CAPSULE | Refills: 0 | Status: SHIPPED | OUTPATIENT
Start: 2024-04-24 | End: 2024-08-23

## 2024-06-17 DIAGNOSIS — F41.0 PANIC ATTACK: ICD-10-CM

## 2024-06-17 DIAGNOSIS — R11.0 NAUSEA: ICD-10-CM

## 2024-06-18 RX ORDER — ONDANSETRON 4 MG/1
TABLET, ORALLY DISINTEGRATING ORAL
Qty: 9 TABLET | Refills: 13 | Status: SHIPPED | OUTPATIENT
Start: 2024-06-18

## 2024-06-19 RX ORDER — LORAZEPAM 0.5 MG/1
0.5 TABLET ORAL EVERY 8 HOURS PRN
Qty: 30 TABLET | Refills: 0 | Status: SHIPPED | OUTPATIENT
Start: 2024-06-19

## 2024-06-19 NOTE — TELEPHONE ENCOUNTER
LORAZEPAM 0.5 MG TABLET       Last Written Prescription Date:  7-13-23  Last Fill Quantity: 30,   # refills: 0  Last Office Visit : 7-13-23  Future Office visit:  none    Routing refill request to provider for review/approval because:  Drug not on the [facility], P or [facility] refill protocol or controlled substance

## 2024-06-29 ENCOUNTER — HEALTH MAINTENANCE LETTER (OUTPATIENT)
Age: 34
End: 2024-06-29

## 2024-07-13 ENCOUNTER — NURSE TRIAGE (OUTPATIENT)
Dept: NURSING | Facility: CLINIC | Age: 34
End: 2024-07-13
Payer: COMMERCIAL

## 2024-07-13 NOTE — TELEPHONE ENCOUNTER
Nurse Triage SBAR    Is this a 2nd Level Triage? NO    Situation:   Spoke with 33 yr old Celia who c/o:    Tested positive for COVID-19 today, 7/13/24.  Developed symptoms 2 days ago.  Sore throat.  Nasal congestion, runny nose.  Body aches  Headache.  Denies fever.  Denies chest pain.  Denies difficulty breathing.    Consent: not needed    Background:   Hx of depression.    Assessment:   Mild Covid symptoms presently.    Protocol Recommended Disposition:   Home Care    Recommendation:   Advised Home Care per protocol.  Care advice given per Covid-19 diagnosed/suspected protocol.  Patient voiced understanding.       Alethea Lopez RN 2:06 PM 7/13/2024  Reason for Disposition   [1] COVID-19 diagnosed by positive lab test (e.g., PCR, rapid self-test kit) AND [2] mild symptoms (e.g., cough, fever, others) AND [3] no complications or SOB    Additional Information   Negative: SEVERE difficulty breathing (e.g., struggling for each breath, speaks in single words)   Negative: Difficult to awaken or acting confused (e.g., disoriented, slurred speech)   Negative: Bluish (or gray) lips or face now   Negative: Shock suspected (e.g., cold/pale/clammy skin, too weak to stand, low BP, rapid pulse)   Negative: Sounds like a life-threatening emergency to the triager   Negative: [1] Diagnosed or suspected COVID-19 AND [2] symptoms lasting 3 or more weeks   Negative: [1] COVID-19 exposure AND [2] no symptoms   Negative: COVID-19 vaccine reaction suspected (e.g., fever, headache, muscle aches) occurring 1 to 3 days after getting vaccine   Negative: COVID-19 vaccine, questions about   Negative: [1] Lives with someone known to have influenza (flu test positive) AND [2] flu-like symptoms (e.g., cough, runny nose, sore throat, SOB; with or without fever)   Negative: [1] Possible COVID-19 symptoms AND [2] triager concerned about severity of symptoms or other causes   Negative: COVID-19 and breastfeeding, questions about   Negative: SEVERE or  constant chest pain or pressure  (Exception: Mild central chest pain, present only when coughing.)   Negative: MODERATE difficulty breathing (e.g., speaks in phrases, SOB even at rest, pulse 100-120)   Negative: [1] Headache AND [2] stiff neck (can't touch chin to chest)   Negative: Chest pain or pressure  (Exception: MILD central chest pain, present only when coughing.)   Negative: [1] Drinking very little AND [2] dehydration suspected (e.g., no urine > 12 hours, very dry mouth, very lightheaded)   Negative: Patient sounds very sick or weak to the triager   Negative: MILD difficulty breathing (e.g., minimal/no SOB at rest, SOB with walking, pulse <100)   Negative: Fever > 103 F (39.4 C)   Negative: [1] Fever > 101 F (38.3 C) AND [2] age > 60 years   Negative: [1] Fever > 100.0 F (37.8 C) AND [2] bedridden (e.g., CVA, chronic illness, recovering from surgery)   Negative: [1] HIGH RISK patient (e.g., weak immune system, age > 64 years, obesity with BMI 30 or higher, pregnant, chronic lung disease or other chronic medical condition) AND [2] COVID symptoms (e.g., cough, fever)  (Exceptions: Already seen by PCP and no new or worsening symptoms.)   Negative: [1] HIGH RISK patient AND [2] influenza is widespread in the community AND [3] ONE OR MORE respiratory symptoms: cough, sore throat, runny or stuffy nose   Negative: [1] HIGH RISK patient AND [2] influenza exposure within the last 7 days AND [3] ONE OR MORE respiratory symptoms: cough, sore throat, runny or stuffy nose   Negative: Fever present > 3 days (72 hours)   Negative: [1] Fever returns after gone for over 24 hours AND [2] symptoms worse or not improved   Negative: [1] Continuous (nonstop) coughing interferes with work or school AND [2] no improvement using cough treatment per Care Advice   Negative: [1] COVID-19 infection suspected by caller or triager AND [2] mild symptoms (cough, fever, or others) AND [3] negative COVID-19 rapid test   Negative: Cough  present > 3 weeks   Negative: [1] COVID-19 diagnosed by positive lab test (e.g., PCR, rapid self-test kit) AND [2] NO symptoms (e.g., cough, fever, others)   Commented on: Oxygen level (e.g., pulse oximetry) 90 percent or lower     Doesn't own   Commented on: Oxygen level (e.g., pulse oximetry) 91 to 94 percent     Doesn't own    Protocols used: Coronavirus (COVID-19) Diagnosed or [id number]

## 2024-07-22 ENCOUNTER — APPOINTMENT (OUTPATIENT)
Dept: LAB | Facility: CLINIC | Age: 34
End: 2024-07-22
Payer: COMMERCIAL

## 2024-07-22 DIAGNOSIS — B00.9 HSV-2 INFECTION: ICD-10-CM

## 2024-07-23 RX ORDER — ACYCLOVIR 400 MG/1
400 TABLET ORAL DAILY
Qty: 30 TABLET | Refills: 0 | Status: SHIPPED | OUTPATIENT
Start: 2024-07-23 | End: 2024-09-09

## 2024-08-02 ENCOUNTER — OFFICE VISIT (OUTPATIENT)
Dept: URGENT CARE | Facility: URGENT CARE | Age: 34
End: 2024-08-02
Payer: COMMERCIAL

## 2024-08-02 VITALS
SYSTOLIC BLOOD PRESSURE: 133 MMHG | WEIGHT: 191 LBS | DIASTOLIC BLOOD PRESSURE: 84 MMHG | HEART RATE: 89 BPM | OXYGEN SATURATION: 97 % | RESPIRATION RATE: 18 BRPM | TEMPERATURE: 98.5 F | BODY MASS INDEX: 30.84 KG/M2

## 2024-08-02 DIAGNOSIS — S39.012A STRAIN OF LUMBAR REGION, INITIAL ENCOUNTER: Primary | ICD-10-CM

## 2024-08-02 DIAGNOSIS — Z86.59 HISTORY OF DEPRESSION: ICD-10-CM

## 2024-08-02 PROCEDURE — 99214 OFFICE O/P EST MOD 30 MIN: CPT | Performed by: PHYSICIAN ASSISTANT

## 2024-08-02 RX ORDER — CYCLOBENZAPRINE HCL 10 MG
10 TABLET ORAL 2 TIMES DAILY PRN
Qty: 20 TABLET | Refills: 0 | Status: SHIPPED | OUTPATIENT
Start: 2024-08-02 | End: 2024-08-12

## 2024-08-02 RX ORDER — NAPROXEN 500 MG/1
500 TABLET ORAL 2 TIMES DAILY WITH MEALS
Qty: 20 TABLET | Refills: 0 | Status: SHIPPED | OUTPATIENT
Start: 2024-08-02 | End: 2024-08-23

## 2024-08-02 ASSESSMENT — PAIN SCALES - GENERAL: PAINLEVEL: MILD PAIN (2)

## 2024-08-02 NOTE — PROGRESS NOTES
Chief Complaint   Patient presents with    Back Pain     Bent over yesterday and feels like pulled muscle - whole lower back          Impression:     ICD-10-CM    1. Strain of lumbar region, initial encounter  S39.012A cyclobenzaprine (FLEXERIL) 10 MG tablet     naproxen (NAPROSYN) 500 MG tablet     Orthopedic  Referral      2. History of depression  Z86.59           Plan vital signs stable.  Neurologically intact.  No concerns about cauda equina syndrome or neural impingement at this time.  Lumbar sprain strain, specifically sacroiliac joints.  Ice.  Flexeril, may cause drowsiness.  Oral naproxen, take with food.  Avoid taking it at the same time as Prozac.  Follow-up with Ortho 1 to 2 weeks.  If improved but not better consider physical therapy.  Instructions for back care and return precautions discussed.        Anu Jimenez PA-C      SUBJECTIVE:    32 yo female presents for acute low back pain, onset yesterday after bending over to pick something up off the floor.  No fevers, chills, weight loss, night sweats.  No lower extremity radicular pain, numbness, tingling, weakness.  No bowel or bladder trouble.  Can tell when she has to go to the bathroom and is able to get there on time.  History of depression, takes Prozac.      No Known Allergies    Past Medical History:   Diagnosis Date    Major depressive disorder, single episode, moderate (H) 9/07       Current Outpatient Medications   Medication Sig Dispense Refill    acyclovir (ZOVIRAX) 400 MG tablet TAKE 1 TABLET (400 MG) BY MOUTH DAILY , 1 TAB TWICE DAILY DURING OUTBREAK 30 tablet 0    FLUoxetine (PROZAC) 40 MG capsule Take 2 capsules (80 mg) by mouth daily Patient needs to see primary provider for further refills. Please call for an appointment at [phone]. 180 capsule 0    levonorgestrel (KYLEENA) 19.5 MG IUD 1 each by Intrauterine route once      LORazepam (ATIVAN) 0.5 MG tablet Take 1 tablet (0.5 mg) by mouth every 8 hours as needed  for anxiety For additional refills, please schedule a follow-up appointment at [phone] 30 tablet 0    Multiple Vitamins-Minerals (MULTIVITAMIN ADULT PO)       ondansetron (ZOFRAN ODT) 4 MG ODT tab TAKE 1-2 TABLETS BY MOUTH EVERY 8 HOURS AS NEEDED FOR NAUSEA. 9 tablet 13     No current facility-administered medications for this visit.       Past Surgical History:   Procedure Laterality Date    TONSILLECTOMY         Social History     Socioeconomic History    Marital status: Single     Spouse name: Not on file    Number of children: 0    Years of education: Not on file    Highest education level: Not on file   Occupational History     Employer: NONE    Tobacco Use    Smoking status: Former     Current packs/day: 0.00     Types: Cigarettes     Quit date: 2014     Years since quittin.7    Smokeless tobacco: Never    Tobacco comments:     socially    Substance and Sexual Activity    Alcohol use: Yes     Alcohol/week: 2.0 standard drinks of alcohol     Comment: socailly     Drug use: No    Sexual activity: Yes     Partners: Male     Birth control/protection: Condom     Comment: Mirena 12/28/15   Other Topics Concern    Parent/sibling w/ CABG, MI or angioplasty before 65F 55M? No     Service Not Asked    Blood Transfusions Not Asked    Caffeine Concern Yes     Comment: 1 cup qd, 1 can qd on average    Occupational Exposure Not Asked    Hobby Hazards Not Asked    Sleep Concern Not Asked    Stress Concern Not Asked    Weight Concern Not Asked    Special Diet Not Asked    Back Care Not Asked    Exercise No    Bike Helmet Not Asked    Seat Belt Yes    Self-Exams Not Asked   Social History Narrative    Not on file     Social Determinants of Health     Financial Resource Strain: Not on file   Food Insecurity: Not on file   Transportation Needs: Not on file   Physical Activity: Not on file   Stress: Not on file   Social Connections: Not on file   Interpersonal Safety: Not on file   Housing Stability:  Not on file       REVIEW OF SYSTEMS  General: negative for fever  Resp: negative for chest pain   CV: negative for chest pain  : negative for dysuria , incontinence, frequency  Musculoskeletal: as above  Neurologic: negative for ataxia, saddle anesthesia, fecal incontinence, one sided weakness,  paresthesias    Physical Exam:  /84 (BP Location: Left arm, Patient Position: Sitting, Cuff Size: Adult Regular)   Pulse 89   Temp 98.5  F (36.9  C) (Tympanic)   Resp 18   Wt 86.6 kg (191 lb)   SpO2 97%   BMI 30.84 kg/m        Constitutional: healthy, alert and no acute distress   CARDIO: RRR, no MRG  RESP: lungs CTA, NAD  NEURO: Patellar  and ankle reflexes intact and equal bilaterally  BACK:  Straight leg raise intact, tender bilateral right greater than left sacroiliac joints with surrounding muscle TTP, strength intact and equal bilateral lower extremities.  Full lumbar flexion and extension but guarded with extension.  GAIT: intact  Psychiatric: mentation appears normal and affect normal/bright        Anu Jimenez PA-C

## 2024-08-20 DIAGNOSIS — B00.9 HSV-2 INFECTION: ICD-10-CM

## 2024-08-20 DIAGNOSIS — F32.1 MODERATE SINGLE CURRENT EPISODE OF MAJOR DEPRESSIVE DISORDER (H): ICD-10-CM

## 2024-08-20 RX ORDER — ACYCLOVIR 400 MG/1
400 TABLET ORAL DAILY
Qty: 30 TABLET | Refills: 0 | OUTPATIENT
Start: 2024-08-20

## 2024-08-22 NOTE — TELEPHONE ENCOUNTER
Medication Requested:  FLUoxetine (PROZAC) 40 MG capsule 180 capsule 0 4/24/2024 -- No   Sig - Route: Take 2 capsules (80 mg) by mouth daily Patient needs to see primary provider for further refills. Please call for an appointment at [phone]. - Oral     ----------------------  Last Office Visit : 7/13/2023  [facility] [place]      Future Office visit:  none  ----------------------      Refill decision: Refill pended and routed to the provider for review/determination due to the following criteria not met:     Medication unable to be refilled by RN due to criteria not met as indicated.                 [x]Supervision - Pt not seen within past 12 months, no future appointment              []Compliance - lapse in therapy/gap in refills              []Verification - order discrepancy, clarification needed              []Controlled medication              []Medication not on [facility], [facility], [facility] refill protocol   [] Abnormal labs/test:  [] Overdue labs/test:    [x]Luanne refill given x1, Pt did not follow-up, pended to care team for decision  [] Medication not active on Pt's med list  [] Drug interaction Warning  [] Medication is Reported/Historical              []Other:     Pass/Fail Protocol Criteria:    SSRIs Protocol [id number]/22/2024 02:47 PM   Protocol Details PHQ-9 score less than 5 in past 6 months    Recent (12 mo) or future (90 days) visit within the authorizing provider's specialty    Medication is active on med list    Medication indicated for associated diagnosis    Patient is age 18 or older    No active pregnancy on record    No positive pregnancy test in last 12 months        PHQ9 7/23: 7

## 2024-08-23 RX ORDER — FLUOXETINE 40 MG/1
80 CAPSULE ORAL DAILY
Qty: 60 CAPSULE | Refills: 0 | Status: SHIPPED | OUTPATIENT
Start: 2024-08-23

## 2024-09-08 ASSESSMENT — SOCIAL DETERMINANTS OF HEALTH (SDOH): HOW OFTEN DO YOU GET TOGETHER WITH FRIENDS OR RELATIVES?: ONCE A WEEK

## 2024-09-09 ENCOUNTER — LAB (OUTPATIENT)
Dept: LAB | Facility: CLINIC | Age: 34
End: 2024-09-09
Attending: INTERNAL MEDICINE
Payer: COMMERCIAL

## 2024-09-09 ENCOUNTER — OFFICE VISIT (OUTPATIENT)
Dept: INTERNAL MEDICINE | Facility: CLINIC | Age: 34
End: 2024-09-09
Attending: INTERNAL MEDICINE
Payer: COMMERCIAL

## 2024-09-09 VITALS
BODY MASS INDEX: 30.89 KG/M2 | HEART RATE: 85 BPM | DIASTOLIC BLOOD PRESSURE: 78 MMHG | WEIGHT: 192.2 LBS | HEIGHT: 66 IN | SYSTOLIC BLOOD PRESSURE: 120 MMHG

## 2024-09-09 DIAGNOSIS — Z00.00 PREVENTATIVE HEALTH CARE: Primary | ICD-10-CM

## 2024-09-09 DIAGNOSIS — B00.9 HSV-2 INFECTION: ICD-10-CM

## 2024-09-09 DIAGNOSIS — E04.9 ENLARGED THYROID: ICD-10-CM

## 2024-09-09 DIAGNOSIS — Z00.00 PREVENTATIVE HEALTH CARE: ICD-10-CM

## 2024-09-09 LAB
ANION GAP SERPL CALCULATED.3IONS-SCNC: 8 MMOL/L (ref 7–15)
BUN SERPL-MCNC: 9.9 MG/DL (ref 6–20)
C TRACH DNA SPEC QL NAA+PROBE: NEGATIVE
CALCIUM SERPL-MCNC: 9.1 MG/DL (ref 8.8–10.4)
CHLORIDE SERPL-SCNC: 102 MMOL/L (ref 98–107)
CHOLEST SERPL-MCNC: 207 MG/DL
CREAT SERPL-MCNC: 0.89 MG/DL (ref 0.51–0.95)
EGFRCR SERPLBLD CKD-EPI 2021: 86 ML/MIN/1.73M2
ERYTHROCYTE [DISTWIDTH] IN BLOOD BY AUTOMATED COUNT: 11.9 % (ref 10–15)
FASTING STATUS PATIENT QL REPORTED: NO
FASTING STATUS PATIENT QL REPORTED: NO
GLUCOSE SERPL-MCNC: 91 MG/DL (ref 70–99)
HCO3 SERPL-SCNC: 27 MMOL/L (ref 22–29)
HCT VFR BLD AUTO: 40.6 % (ref 35–47)
HCV AB SERPL QL IA: NONREACTIVE
HDLC SERPL-MCNC: 72 MG/DL
HGB BLD-MCNC: 13.7 G/DL (ref 11.7–15.7)
HIV 1+2 AB+HIV1 P24 AG SERPL QL IA: NONREACTIVE
LDLC SERPL CALC-MCNC: 114 MG/DL
MCH RBC QN AUTO: 30.8 PG (ref 26.5–33)
MCHC RBC AUTO-ENTMCNC: 33.7 G/DL (ref 31.5–36.5)
MCV RBC AUTO: 91 FL (ref 78–100)
NONHDLC SERPL-MCNC: 135 MG/DL
PLATELET # BLD AUTO: 282 10E3/UL (ref 150–450)
POTASSIUM SERPL-SCNC: 4.1 MMOL/L (ref 3.4–5.3)
RBC # BLD AUTO: 4.45 10E6/UL (ref 3.8–5.2)
SODIUM SERPL-SCNC: 137 MMOL/L (ref 135–145)
TRIGL SERPL-MCNC: 106 MG/DL
TSH SERPL DL<=0.005 MIU/L-ACNC: 0.71 UIU/ML (ref 0.3–4.2)
WBC # BLD AUTO: 5 10E3/UL (ref 4–11)

## 2024-09-09 PROCEDURE — 87591 N.GONORRHOEAE DNA AMP PROB: CPT

## 2024-09-09 PROCEDURE — 84443 ASSAY THYROID STIM HORMONE: CPT

## 2024-09-09 PROCEDURE — 99213 OFFICE O/P EST LOW 20 MIN: CPT | Mod: 25 | Performed by: INTERNAL MEDICINE

## 2024-09-09 PROCEDURE — 87389 HIV-1 AG W/HIV-1&-2 AB AG IA: CPT

## 2024-09-09 PROCEDURE — G0145 SCR C/V CYTO,THINLAYER,RESCR: HCPCS | Performed by: INTERNAL MEDICINE

## 2024-09-09 PROCEDURE — 86803 HEPATITIS C AB TEST: CPT

## 2024-09-09 PROCEDURE — 85018 HEMOGLOBIN: CPT

## 2024-09-09 PROCEDURE — 82374 ASSAY BLOOD CARBON DIOXIDE: CPT

## 2024-09-09 PROCEDURE — 80061 LIPID PANEL: CPT

## 2024-09-09 PROCEDURE — 87491 CHLMYD TRACH DNA AMP PROBE: CPT

## 2024-09-09 PROCEDURE — 87624 HPV HI-RISK TYP POOLED RSLT: CPT | Performed by: INTERNAL MEDICINE

## 2024-09-09 PROCEDURE — 36415 COLL VENOUS BLD VENIPUNCTURE: CPT

## 2024-09-09 PROCEDURE — 99395 PREV VISIT EST AGE 18-39: CPT | Performed by: INTERNAL MEDICINE

## 2024-09-09 RX ORDER — ACYCLOVIR 400 MG/1
400 TABLET ORAL DAILY
Qty: 30 TABLET | Refills: 0 | Status: SHIPPED | OUTPATIENT
Start: 2024-09-09

## 2024-09-09 NOTE — LETTER
"9/9/2024       RE: Celia Castro  [address]  [facility] [id number]     Dear Colleague,    Thank you for referring your patient, Celia Castro, to the [facility] [place] at [facility]. Please see a copy of my visit note below.    Preventive Care Visit  [facility]  Ellie Cheney MD, Internal Medicine  Sep 9, 2024      Assessment & Plan    Preventative health care  Discussed preventive healthcare needs with patient.  Advised on vaccinations.  Recommend screening for dyslipidemia as well as diabetes.  Screening for sexually transmitted infections will be done per patient request.  Pap smear was collected today as well.  - Basic Metabolic Panel; Future  - Lipid Profile; Future  - TSH with free T4 reflex; Future  - CBC with platelets; Future  - Neisseria gonorrhoeae PCR; Future  - Chlamydia by PCR; Future  - HIV Antigen Antibody Combo; Future  - Hepatitis C Screen Reflex to HCV RNA Quant and Genotype; Future  - Obtaining, preparing and conveyance of cervical or vaginal smear to laboratory.  - HPV and Gynecologic Cytology Panel - Recommended Age 30 - 65 Years    HSV-2 infection    - acyclovir (ZOVIRAX) 400 MG tablet; Take 1 tablet (400 mg) by mouth daily. , 1 tab twice daily during outbreak    Enlarged thyroid  Given mild enlargement of the thyroid, recommend further evaluation with an ultrasound.  - US Thyroid; Future            BMI  Estimated body mass index is 31.04 kg/m  as calculated from the following:    Height as of this encounter: 1.676 m (5' 5.98\").    Weight as of this encounter: 87.2 kg (192 lb 3.2 oz).       Counseling  Appropriate preventive services were addressed with this patient via screening, questionnaire, or discussion as appropriate for fall prevention, nutrition, physical activity, Tobacco-use cessation, social engagement, weight loss and cognition.  Checklist reviewing " preventive services available has been given to the patient.  Reviewed patient's diet, addressing concerns and/or questions.   She is at risk for lack of exercise and has been provided with information to increase physical activity for the benefit of her well-being.           No follow-ups on file.    Salome Yang is a 33 year old, presenting for the following:  Annual Visit         Health Care Directive  Patient does not have a Health Care Directive or Living Will: Discussed advance care planning with patient; however, patient declined at this time.    HPI    Patient is here for routine preventive visit.  She reports that she overall has been doing well.  She denies any concerns that need to be addressed today.        9/8/2024   General Health   How would you rate your overall physical health? (!) FAIR   Feel stress (tense, anxious, or unable to sleep) To some extent      (!) STRESS CONCERN      9/8/2024   Nutrition   Three or more servings of calcium each day? (!) I DON'T KNOW   Diet: Regular (no restrictions)   How many servings of fruit and vegetables per day? (!) 2-3   How many sweetened beverages each day? (!) 2            9/8/2024   Exercise   Days per week of moderate/strenous exercise 2 days   Average minutes spent exercising at this level 30 min      (!) EXERCISE CONCERN      9/8/2024   Social Factors   Frequency of gathering with friends or relatives Once a week   Worry food won't last until get money to buy more No   Food not last or not have enough money for food? No   Do you have housing? (Housing is defined as stable permanent housing and does not include staying ouside in a car, in a tent, in an abandoned building, in an overnight shelter, or couch-surfing.) No   Are you worried about losing your housing? No   Lack of transportation? No   Unable to get utilities (heat,electricity)? No   Want help with housing or utility concern? No      (!) HOUSING CONCERN PRESENT      9/8/2024   Dental    Dentist two times every year? Yes                       2024   Substance Use   Alcohol more than 3/day or more than 7/wk No   Do you use any other substances recreationally? No        Social History     Tobacco Use     Smoking status: Former     Current packs/day: 0.00     Types: Cigarettes     Quit date: 2014     Years since quittin.8     Smokeless tobacco: Never     Tobacco comments:     socially    Substance Use Topics     Alcohol use: Yes     Alcohol/week: 2.0 standard drinks of alcohol     Comment: socailly      Drug use: No             2024   Breast Cancer Screening   Family history of breast, colon, or ovarian cancer? Yes          2024   LAST FHS-7 RESULTS   1st degree relative breast or ovarian cancer Yes   Any relative bilateral breast cancer Unknown   Any male have breast cancer No   Any ONE woman have BOTH breast AND ovarian cancer No   Any woman with breast cancer before 50yrs No   2 or more relatives with breast AND/OR ovarian cancer No   2 or more relatives with breast AND/OR bowel cancer No           Mammogram Screening - Patient under 40 years of age: Routine Mammogram Screening not recommended.         2024   STI Screening   New sexual partner(s) since last STI/HIV test? (!) YES         History of abnormal Pap smear: No - age 30- 64 PAP with HPV every 5 years recommended        Latest Ref Rng & Units 2021     9:24 AM 2021     9:20 AM 3/27/2018    10:28 AM   PAP / HPV   PAP (Historical)  NIL   NIL    HPV 16 DNA NEG^Negative  Negative     HPV 18 DNA NEG^Negative  Negative     Other HR HPV NEG^Negative  Negative             2024   Contraception/Family Planning   Questions about contraception or family planning No           Reviewed and updated as needed this visit by Provider                    Past Medical History:   Diagnosis Date     Major depressive disorder, single episode, moderate (H)      Past Surgical History:   Procedure Laterality Date      "TONSILLECTOMY  2008            Objective   Exam  /78   Pulse 85   Ht 1.676 m (5' 5.98\")   Wt 87.2 kg (192 lb 3.2 oz)   BMI 31.04 kg/m     Estimated body mass index is 31.04 kg/m  as calculated from the following:    Height as of this encounter: 1.676 m (5' 5.98\").    Weight as of this encounter: 87.2 kg (192 lb 3.2 oz).    Physical Exam  GENERAL: alert and no distress  EYES: Eyes grossly normal to inspection, PERRL and conjunctivae and sclerae normal  NECK: no adenopathy and thyroid is slightly enlarged without palpable nodules  RESP: lungs clear to auscultation - no rales, rhonchi or wheezes  CV: regular rate and rhythm, normal S1 S2, no S3 or S4, no murmur, click or rub, no peripheral edema  ABDOMEN: soft, nontender and bowel sounds normal   (female): normal female external genitalia, normal urethral meatus , normal vaginal mucosa, and normal cervix  MS: no gross musculoskeletal defects noted, no edema  NEURO: Normal strength and tone, mentation intact and speech normal        Signed Electronically by: Ellie Cheney MD        Again, thank you for allowing me to participate in the care of your patient.      Sincerely,    Ellie Cheney MD    "

## 2024-09-10 ENCOUNTER — ANCILLARY PROCEDURE (OUTPATIENT)
Dept: ULTRASOUND IMAGING | Facility: CLINIC | Age: 34
End: 2024-09-10
Attending: INTERNAL MEDICINE
Payer: COMMERCIAL

## 2024-09-10 DIAGNOSIS — E04.9 ENLARGED THYROID: ICD-10-CM

## 2024-09-10 LAB — N GONORRHOEA DNA SPEC QL NAA+PROBE: NEGATIVE

## 2024-09-10 PROCEDURE — 76536 US EXAM OF HEAD AND NECK: CPT | Mod: TC | Performed by: RADIOLOGY

## 2024-09-10 NOTE — PROGRESS NOTES
"Preventive Care Visit  [facility]  Ellie Cheney MD, Internal Medicine  Sep 9, 2024      Assessment & Plan     Preventative health care  Discussed preventive healthcare needs with patient.  Advised on vaccinations.  Recommend screening for dyslipidemia as well as diabetes.  Screening for sexually transmitted infections will be done per patient request.  Pap smear was collected today as well.  - Basic Metabolic Panel; Future  - Lipid Profile; Future  - TSH with free T4 reflex; Future  - CBC with platelets; Future  - Neisseria gonorrhoeae PCR; Future  - Chlamydia by PCR; Future  - HIV Antigen Antibody Combo; Future  - Hepatitis C Screen Reflex to HCV RNA Quant and Genotype; Future  - Obtaining, preparing and conveyance of cervical or vaginal smear to laboratory.  - HPV and Gynecologic Cytology Panel - Recommended Age 30 - 65 Years    HSV-2 infection    - acyclovir (ZOVIRAX) 400 MG tablet; Take 1 tablet (400 mg) by mouth daily. , 1 tab twice daily during outbreak    Enlarged thyroid  Given mild enlargement of the thyroid, recommend further evaluation with an ultrasound.  - US Thyroid; Future            BMI  Estimated body mass index is 31.04 kg/m  as calculated from the following:    Height as of this encounter: 1.676 m (5' 5.98\").    Weight as of this encounter: 87.2 kg (192 lb 3.2 oz).       Counseling  Appropriate preventive services were addressed with this patient via screening, questionnaire, or discussion as appropriate for fall prevention, nutrition, physical activity, Tobacco-use cessation, social engagement, weight loss and cognition.  Checklist reviewing preventive services available has been given to the patient.  Reviewed patient's diet, addressing concerns and/or questions.   She is at risk for lack of exercise and has been provided with information to increase physical activity for the benefit of her well-being.           No follow-ups on " file.    Salome Yang is a 33 year old, presenting for the following:  Annual Visit         Health Care Directive  Patient does not have a Health Care Directive or Living Will: Discussed advance care planning with patient; however, patient declined at this time.    HPI    Patient is here for routine preventive visit.  She reports that she overall has been doing well.  She denies any concerns that need to be addressed today.        9/8/2024   General Health   How would you rate your overall physical health? (!) FAIR   Feel stress (tense, anxious, or unable to sleep) To some extent      (!) STRESS CONCERN      9/8/2024   Nutrition   Three or more servings of calcium each day? (!) I DON'T KNOW   Diet: Regular (no restrictions)   How many servings of fruit and vegetables per day? (!) 2-3   How many sweetened beverages each day? (!) 2            9/8/2024   Exercise   Days per week of moderate/strenous exercise 2 days   Average minutes spent exercising at this level 30 min      (!) EXERCISE CONCERN      9/8/2024   Social Factors   Frequency of gathering with friends or relatives Once a week   Worry food won't last until get money to buy more No   Food not last or not have enough money for food? No   Do you have housing? (Housing is defined as stable permanent housing and does not include staying ouside in a car, in a tent, in an abandoned building, in an overnight shelter, or couch-surfing.) No   Are you worried about losing your housing? No   Lack of transportation? No   Unable to get utilities (heat,electricity)? No   Want help with housing or utility concern? No      (!) HOUSING CONCERN PRESENT      9/8/2024   Dental   Dentist two times every year? Yes                       9/8/2024   Substance Use   Alcohol more than 3/day or more than 7/wk No   Do you use any other substances recreationally? No        Social History     Tobacco Use    Smoking status: Former     Current packs/day: 0.00     Types: Cigarettes      "Quit date: 2014     Years since quittin.8    Smokeless tobacco: Never    Tobacco comments:     socially    Substance Use Topics    Alcohol use: Yes     Alcohol/week: 2.0 standard drinks of alcohol     Comment: socailly     Drug use: No             2024   Breast Cancer Screening   Family history of breast, colon, or ovarian cancer? Yes          2024   LAST FHS-7 RESULTS   1st degree relative breast or ovarian cancer Yes   Any relative bilateral breast cancer Unknown   Any male have breast cancer No   Any ONE woman have BOTH breast AND ovarian cancer No   Any woman with breast cancer before 50yrs No   2 or more relatives with breast AND/OR ovarian cancer No   2 or more relatives with breast AND/OR bowel cancer No           Mammogram Screening - Patient under 40 years of age: Routine Mammogram Screening not recommended.         2024   STI Screening   New sexual partner(s) since last STI/HIV test? (!) YES         History of abnormal Pap smear: No - age 30- 64 PAP with HPV every 5 years recommended        Latest Ref Rng & Units 2021     9:24 AM 2021     9:20 AM 3/27/2018    10:28 AM   PAP / HPV   PAP (Historical)  NIL   NIL    HPV 16 DNA NEG^Negative  Negative     HPV 18 DNA NEG^Negative  Negative     Other HR HPV NEG^Negative  Negative             2024   Contraception/Family Planning   Questions about contraception or family planning No           Reviewed and updated as needed this visit by Provider                    Past Medical History:   Diagnosis Date    Major depressive disorder, single episode, moderate (H)      Past Surgical History:   Procedure Laterality Date    TONSILLECTOMY              Objective    Exam  /78   Pulse 85   Ht 1.676 m (5' 5.98\")   Wt 87.2 kg (192 lb 3.2 oz)   BMI 31.04 kg/m     Estimated body mass index is 31.04 kg/m  as calculated from the following:    Height as of this encounter: 1.676 m (5' 5.98\").    Weight as of this encounter: 87.2 " kg (192 lb 3.2 oz).    Physical Exam  GENERAL: alert and no distress  EYES: Eyes grossly normal to inspection, PERRL and conjunctivae and sclerae normal  NECK: no adenopathy and thyroid is slightly enlarged without palpable nodules  RESP: lungs clear to auscultation - no rales, rhonchi or wheezes  CV: regular rate and rhythm, normal S1 S2, no S3 or S4, no murmur, click or rub, no peripheral edema  ABDOMEN: soft, nontender and bowel sounds normal   (female): normal female external genitalia, normal urethral meatus , normal vaginal mucosa, and normal cervix  MS: no gross musculoskeletal defects noted, no edema  NEURO: Normal strength and tone, mentation intact and speech normal        Signed Electronically by: Ellie Cheney MD

## 2024-09-11 LAB
HPV HR 12 DNA CVX QL NAA+PROBE: NEGATIVE
HPV16 DNA CVX QL NAA+PROBE: NEGATIVE
HPV18 DNA CVX QL NAA+PROBE: NEGATIVE
HUMAN PAPILLOMA VIRUS FINAL DIAGNOSIS: NORMAL

## 2024-09-13 LAB
BKR AP ASSOCIATED HPV REPORT: NORMAL
BKR LAB AP GYN ADEQUACY: NORMAL
BKR LAB AP GYN INTERPRETATION: NORMAL
BKR LAB AP PREVIOUS ABNORMAL: NORMAL
PATH REPORT.COMMENTS IMP SPEC: NORMAL
PATH REPORT.COMMENTS IMP SPEC: NORMAL
PATH REPORT.RELEVANT HX SPEC: NORMAL

## 2024-10-05 DIAGNOSIS — F32.1 MODERATE SINGLE CURRENT EPISODE OF MAJOR DEPRESSIVE DISORDER (H): ICD-10-CM

## 2024-10-10 RX ORDER — FLUOXETINE 40 MG/1
80 CAPSULE ORAL DAILY
Qty: 60 CAPSULE | Refills: 5 | Status: SHIPPED | OUTPATIENT
Start: 2024-10-10

## 2025-01-17 DIAGNOSIS — B00.9 HSV-2 INFECTION: ICD-10-CM

## 2025-01-23 RX ORDER — ACYCLOVIR 400 MG/1
400 TABLET ORAL DAILY
Qty: 60 TABLET | Refills: 0 | Status: SHIPPED | OUTPATIENT
Start: 2025-01-23

## 2025-01-23 NOTE — TELEPHONE ENCOUNTER
Last Written Prescription:    acyclovir (ZOVIRAX) 400 MG tablet 30 tablet 0 11/22/2024     ----------------------  Last Visit Date: 9/9/24  Future Visit Date: 0  ----------------------    Refill decision: Medication refilled per  Medication Refill in Ambulatory Care  policy.          Request from pharmacy:  Requested Prescriptions   Pending Prescriptions Disp Refills    acyclovir (ZOVIRAX) 400 MG tablet [Pharmacy Med Name: ACYCLOVIR 400 MG TABLET] 30 tablet 0     Sig: TAKE 1 TABLET (400 MG) BY MOUTH DAILY. , 1 TAB TWICE DAILY DURING OUTBREAK       Antivirals Passed - 1/23/2025  1:57 PM        Passed - Patient is age 12 or older        Passed - Medication is active on med list        Passed - Recent (12 mo) or future (90 days) visit within the authorizing provider's specialty     The patient must have completed an in-person or virtual visit within the past 12 months or has a future visit scheduled within the next 90 days with the authorizing provider s specialty.  Urgent care and e-visits do not qualify as an office visit for this protocol.          Passed - Medication indicated for associated diagnosis     Medication is associated with one or more of the following diagnoses:     Genital herpes simplex   Herpes simples   Herpes zoster, shingles   Varicella   Recurrent herpes simplex labialis   HIV infection   Varicella-zoster virus infection, prophylaxis

## 2025-02-11 ASSESSMENT — ANXIETY QUESTIONNAIRES
IF YOU CHECKED OFF ANY PROBLEMS ON THIS QUESTIONNAIRE, HOW DIFFICULT HAVE THESE PROBLEMS MADE IT FOR YOU TO DO YOUR WORK, TAKE CARE OF THINGS AT HOME, OR GET ALONG WITH OTHER PEOPLE: SOMEWHAT DIFFICULT
GAD7 TOTAL SCORE: 7
1. FEELING NERVOUS, ANXIOUS, OR ON EDGE: SEVERAL DAYS
7. FEELING AFRAID AS IF SOMETHING AWFUL MIGHT HAPPEN: SEVERAL DAYS
2. NOT BEING ABLE TO STOP OR CONTROL WORRYING: SEVERAL DAYS
8. IF YOU CHECKED OFF ANY PROBLEMS, HOW DIFFICULT HAVE THESE MADE IT FOR YOU TO DO YOUR WORK, TAKE CARE OF THINGS AT HOME, OR GET ALONG WITH OTHER PEOPLE?: SOMEWHAT DIFFICULT
3. WORRYING TOO MUCH ABOUT DIFFERENT THINGS: SEVERAL DAYS
6. BECOMING EASILY ANNOYED OR IRRITABLE: SEVERAL DAYS
4. TROUBLE RELAXING: SEVERAL DAYS
GAD7 TOTAL SCORE: 7
5. BEING SO RESTLESS THAT IT IS HARD TO SIT STILL: SEVERAL DAYS
7. FEELING AFRAID AS IF SOMETHING AWFUL MIGHT HAPPEN: SEVERAL DAYS
GAD7 TOTAL SCORE: 7

## 2025-02-13 ENCOUNTER — VIRTUAL VISIT (OUTPATIENT)
Dept: PEDIATRICS | Facility: CLINIC | Age: 35
End: 2025-02-13
Payer: COMMERCIAL

## 2025-02-13 DIAGNOSIS — F32.1 MODERATE SINGLE CURRENT EPISODE OF MAJOR DEPRESSIVE DISORDER (H): Primary | ICD-10-CM

## 2025-02-13 RX ORDER — FLUOXETINE HYDROCHLORIDE 40 MG/1
80 CAPSULE ORAL DAILY
Qty: 60 CAPSULE | Refills: 5 | Status: SHIPPED | OUTPATIENT
Start: 2025-02-13

## 2025-02-13 ASSESSMENT — PATIENT HEALTH QUESTIONNAIRE - PHQ9
SUM OF ALL RESPONSES TO PHQ QUESTIONS 1-9: 10
SUM OF ALL RESPONSES TO PHQ QUESTIONS 1-9: 10
10. IF YOU CHECKED OFF ANY PROBLEMS, HOW DIFFICULT HAVE THESE PROBLEMS MADE IT FOR YOU TO DO YOUR WORK, TAKE CARE OF THINGS AT HOME, OR GET ALONG WITH OTHER PEOPLE: SOMEWHAT DIFFICULT

## 2025-02-13 NOTE — PROGRESS NOTES
"Celia is a 34 year old who is being evaluated via a billable video visit.    How would you like to obtain your AVS? MyChart  If the video visit is dropped, the invitation should be resent by: Send to e-mail at: [email or web address]  Will anyone else be joining your video visit? No      Assessment & Plan     (F32.1) Moderate single current episode of major depressive disorder (H)  (primary encounter diagnosis)  Comment: patient currently on max dose prozac - reviewed medications tried previously with side effects. Patient would like to see psychiatry, discussed short term referral to psychiatry and return care to pcp, she is agreeable. Also open to therapy if covered by [employer].   Plan: Adult Mental Health  Referral, Adult         Mental Health  Referral, FLUoxetine         (PROZAC) 40 MG capsule            The longitudinal plan of care for the diagnosis(es)/condition(s) as documented were addressed during this visit. Due to the added complexity in care, I will continue to support Celia in the subsequent management and with ongoing continuity of care.            BMI  Estimated body mass index is 31.04 kg/m  as calculated from the following:    Height as of 9/9/24: 1.676 m (5' 5.98\").    Weight as of 9/9/24: 87.2 kg (192 lb 3.2 oz).         Subjective   Celia is a 34 year old, presenting for the following health issues:  Recheck Medication      2/13/2025    10:55 AM   Additional Questions   Roomed by Alize Silva CMA   Accompanied by N/A         2/13/2025    10:55 AM   Patient Reported Additional Medications   Patient reports taking the following new medications N/A     History of Present Illness       Mental Health Follow-up:  Patient presents to follow-up on Depression & Anxiety.Patient's depression since last visit has been:  Worse  The patient is having other symptoms associated with depression.  Patient's anxiety since last visit has been:  Medium  The patient is having other symptoms " associated with anxiety.  Any significant life events: financial concerns  Patient is feeling anxious or having panic attacks.  Patient has no concerns about alcohol or drug use.    She eats 2-3 servings of fruits and vegetables daily.She consumes 2 sweetened beverage(s) daily.She exercises with enough effort to increase her heart rate 20 to 29 minutes per day.  She exercises with enough effort to increase her heart rate 3 or less days per week. She is missing 1 dose(s) of medications per week.  She is not taking prescribed medications regularly due to remembering to take.     Last visit with me - VV over 2 years ago - after this time she felt ok for a while.     She is currently seeing a couples therapist, but paying out of pocket.     From previous notes:  Paroxitine - side effects  Buspar - did not help and side effects  Wellbutrin - unsure if it was by itself or with something else ?tinnitus  Xanax - for prn use  Effexor - unsure (long time ago)     From other providers, currently taking prozac 80mg daily and prn ativan (once monthly).     Patient is trying to see friends since she knows she needs to, but hard to find motivation for this. During the week when at home things are very hard.         8/25/2022     1:04 PM 7/13/2023     1:55 PM 2/13/2025    10:55 AM   PHQ   PHQ-9 Total Score 8 7 10    Q9: Thoughts of better off dead/self-harm past 2 weeks Not at all Not at all Not at all        Proxy-reported         8/25/2022     1:04 PM 7/13/2023     1:55 PM 2/11/2025     9:38 AM   JUDAH-7 SCORE   Total Score 4 (minimal anxiety)  7 (mild anxiety)   Total Score 4 4 7        Patient-reported                   Objective    Vitals - Patient Reported  Weight (Patient Reported): 83 kg (183 lb)  Pain Score: No Pain (0)        Physical Exam   GENERAL: alert and no distress  EYES: Eyes grossly normal to inspection.  No discharge or erythema, or obvious scleral/conjunctival abnormalities.  RESP: No audible wheeze, cough, or  visible cyanosis.    SKIN: Visible skin clear. No significant rash, abnormal pigmentation or lesions.  NEURO: Cranial nerves grossly intact.  Mentation and speech appropriate for age.  PSYCH: Appropriate affect, tone, and pace of words          Video-Visit Details    Type of service:  Video Visit   Originating Location (pt. Location): Home    Distant Location (provider location):  On-site  Platform used for Video Visit: Terry  Signed Electronically by: Rissa Serrano MD

## 2025-02-20 DIAGNOSIS — B00.9 HSV-2 INFECTION: ICD-10-CM

## 2025-02-20 RX ORDER — ACYCLOVIR 400 MG/1
TABLET ORAL
Qty: 60 TABLET | Refills: 0 | OUTPATIENT
Start: 2025-02-20

## 2025-02-20 NOTE — TELEPHONE ENCOUNTER
I have not addressed this with patient in the past year.    If she truly needs a refills, ok for evisit.

## 2025-02-22 ENCOUNTER — E-VISIT (OUTPATIENT)
Dept: PEDIATRICS | Facility: CLINIC | Age: 35
End: 2025-02-22
Payer: COMMERCIAL

## 2025-02-22 DIAGNOSIS — B00.9 RECURRENT HERPES SIMPLEX: Primary | ICD-10-CM

## 2025-02-24 RX ORDER — ACYCLOVIR 400 MG/1
TABLET ORAL
Qty: 90 TABLET | Refills: 3 | Status: SHIPPED | OUTPATIENT
Start: 2025-02-24

## 2025-02-24 NOTE — PATIENT INSTRUCTIONS
Thank you for choosing us for your care. I have placed an order for a prescription so that you can start treatment:  Orders Placed This Encounter   Medications     acyclovir (ZOVIRAX) 400 MG tablet     Sig: Take 1 tablet (400 mg) by mouth daily. 1 tab twice daily during outbreak     Dispense:  90 tablet     Refill:  3        I have also placed ordered to check you liver, kidneys and cell counts since you are taking this daily. Please make a lab only appt to have these doen.    Also, please let me know how often you are getting outbreaks while on this daily treatment.    View your full visit summary for details by clicking on the link below. Your pharmacist will able to address any questions you may have about the medication.

## 2025-03-06 ENCOUNTER — LAB (OUTPATIENT)
Dept: LAB | Facility: CLINIC | Age: 35
End: 2025-03-06
Payer: COMMERCIAL

## 2025-03-06 DIAGNOSIS — B00.9 RECURRENT HERPES SIMPLEX: ICD-10-CM

## 2025-03-06 LAB
ALBUMIN SERPL BCG-MCNC: 4.5 G/DL (ref 3.5–5.2)
ALP SERPL-CCNC: 58 U/L (ref 40–150)
ALT SERPL W P-5'-P-CCNC: 10 U/L (ref 0–50)
ANION GAP SERPL CALCULATED.3IONS-SCNC: 11 MMOL/L (ref 7–15)
AST SERPL W P-5'-P-CCNC: 14 U/L (ref 0–45)
BASOPHILS # BLD AUTO: 0 10E3/UL (ref 0–0.2)
BASOPHILS NFR BLD AUTO: 1 %
BILIRUB SERPL-MCNC: 0.3 MG/DL
BUN SERPL-MCNC: 11 MG/DL (ref 6–20)
CALCIUM SERPL-MCNC: 9.7 MG/DL (ref 8.8–10.4)
CHLORIDE SERPL-SCNC: 104 MMOL/L (ref 98–107)
CREAT SERPL-MCNC: 0.84 MG/DL (ref 0.51–0.95)
EGFRCR SERPLBLD CKD-EPI 2021: >90 ML/MIN/1.73M2
EOSINOPHIL # BLD AUTO: 0.1 10E3/UL (ref 0–0.7)
EOSINOPHIL NFR BLD AUTO: 1 %
ERYTHROCYTE [DISTWIDTH] IN BLOOD BY AUTOMATED COUNT: 11.9 % (ref 10–15)
GLUCOSE SERPL-MCNC: 93 MG/DL (ref 70–99)
HCO3 SERPL-SCNC: 23 MMOL/L (ref 22–29)
HCT VFR BLD AUTO: 39.5 % (ref 35–47)
HGB BLD-MCNC: 13.3 G/DL (ref 11.7–15.7)
IMM GRANULOCYTES # BLD: 0 10E3/UL
IMM GRANULOCYTES NFR BLD: 0 %
LYMPHOCYTES # BLD AUTO: 1.5 10E3/UL (ref 0.8–5.3)
LYMPHOCYTES NFR BLD AUTO: 24 %
MCH RBC QN AUTO: 30.5 PG (ref 26.5–33)
MCHC RBC AUTO-ENTMCNC: 33.7 G/DL (ref 31.5–36.5)
MCV RBC AUTO: 91 FL (ref 78–100)
MONOCYTES # BLD AUTO: 0.6 10E3/UL (ref 0–1.3)
MONOCYTES NFR BLD AUTO: 9 %
NEUTROPHILS # BLD AUTO: 4 10E3/UL (ref 1.6–8.3)
NEUTROPHILS NFR BLD AUTO: 65 %
NRBC # BLD AUTO: 0 10E3/UL
NRBC BLD AUTO-RTO: 0 /100
PLATELET # BLD AUTO: 297 10E3/UL (ref 150–450)
POTASSIUM SERPL-SCNC: 4.3 MMOL/L (ref 3.4–5.3)
PROT SERPL-MCNC: 7.2 G/DL (ref 6.4–8.3)
RBC # BLD AUTO: 4.36 10E6/UL (ref 3.8–5.2)
SODIUM SERPL-SCNC: 138 MMOL/L (ref 135–145)
WBC # BLD AUTO: 6.2 10E3/UL (ref 4–11)

## 2025-04-01 NOTE — PROGRESS NOTES
Answers submitted by the patient for this visit:  Patient Health Questionnaire (Submitted on 2025)  If you checked off any problems, how difficult have these problems made it for you to do your work, take care of things at home, or get along with other people?: Somewhat difficult  PHQ9 TOTAL SCORE: 10      [facility]         PATIENT'S NAME: Celia Castro  PREFERRED NAME: Celia  PRONOUNS:       MRN: [id number]  : 1990  ADDRESS: [facility] [address]  [facility] [address]  [facility] NUM[facility]:  [id number]  DATE OF SERVICE: 25  START TIME: 1350  END TIME: 1440  PREFERRED PHONE: [phone]  May we leave a program related message: Yes  EMERGENCY CONTACT: was not obtained  .  SERVICE MODALITY:  Video Visit:      Provider verified identity through the following two step process.  Patient provided:  Patient  and Patient address    Telemedicine Visit: The patient's condition can be safely assessed and treated via synchronous audio and visual telemedicine encounter.      Reason for Telemedicine Visit: Services only offered telehealth    Originating Site (Patient Location): Patient's home    Distant Site (Provider Location): Provider Remote Setting- Home Office    Consent:  The patient/guardian has verbally consented to: the potential risks and benefits of telemedicine (video visit) versus in person care; bill my insurance or make self-payment for services provided; and responsibility for payment of non-covered services.     Patient would like the video invitation sent by:  My Chart    Mode of Communication:  Video Conference via Amwell    Distant Location (Provider):  Off-site    As the provider I attest to compliance with applicable laws and regulations related to telemedicine.    UNIVERSAL ADULT Mental Health DIAGNOSTIC ASSESSMENT    Identifying Information:  Patient is a 34 year old,   individual.  Patient was referred for an assessment by primary care clinic.  Patient attended the session  "alone.    Chief Complaint:   The reason for seeking services at this time is: \"Depression and anxiety\".  The problem(s) began 01/01/07. Things came to a head in 2007, she first tried to go to college and started having panic attack. Pt has been in and out of therapy before and has tried medications.     Patient has attempted to resolve these concerns in the past through therapy and medication .    Social/Family History:  Patient reported they grew up in [place], [place].  They were raised by biological parents .  Parents were always together.  Patient reported that their childhood was secure, a lot of guilt and shame.  Patient described their current relationships with family of origin as good with sisters but with parents it's fine but not close. Pt has two sister, Pt is the youngest.     The patient describes their cultural background as .  Cultural influences and impact on patient's life structure, values, norms, and healthcare: Raised Druze, no longer practicing.  Contextual influences on patient's health include: Individual Factors depression and lack of motivation haven't gotten better with more daylight and sunlight .    These factors will be addressed in the Preliminary Treatment plan. Patient identified their preferred language to be English. Patient reported they does not need the assistance of an  or other support involved in therapy.     Patient reported had no significant delays in developmental tasks.   Patient's highest education level was college graduate  .  Patient identified the following learning problems: none reported.  Modifications will not be used to assist communication in therapy.  Patient reports they are  able to understand written materials.    Patient reported the following relationship history includes 2 fairly serious relationships but they ended long before she met her current partner.  Patient's current relationship status is has a partner or significant " other for 5 years.   Patient identified their sexual orientation as bi-sexual.  Patient reported having 0  child(paula). Patient identified partner; siblings; pets; friends as part of their support system.  Patient identified the quality of these relationships as fair.     Patient's current living/housing situation involves staying in own home/apartment.  The immediate members of family and household include Dylan Elam, 35,Partner  and they report that housing is stable.    Patient is currently employed fulltime.  Patient reports their finances are obtained through employment; partner. Patient does identify finances as a current stressor.      Patient reported that they have not been involved with the legal system.   Patient does not report being under probation/ parole/ jurisdiction.     Patient's Strengths and Limitations:  Patient identified the following strengths or resources that will help them succeed in treatment: friends / good social support, intelligence, and positive work environment. Things that may interfere with the patient's success in treatment include:  depression causes sleep and fatigue issues .     Assessments:  The following assessments were completed by patient for this visit:  PHQ2: No questionnaires on file.  PHQ9:       11/8/2021    10:28 AM 12/9/2021     4:30 PM 5/18/2022     3:04 PM 8/25/2022     1:04 PM 7/13/2023     1:55 PM 2/13/2025    10:55 AM 4/2/2025     2:49 PM   PHQ-9 SCORE   PHQ-9 Total Score MyChart 8 (Mild depression) 12 (Moderate depression)  8 (Mild depression)  10 (Moderate depression) 10 (Moderate depression)   PHQ-9 Total Score 8 12 10 8 7 10  10        Patient-reported    Proxy-reported     GAD2:       4/2/2025     2:58 PM   JUDAH-2   Feeling nervous, anxious, or on edge 1   Not being able to stop or control worrying 1   JUDAH-2 Total Score 2        Patient-reported     GAD7:       5/19/2021     5:03 PM 8/16/2021     1:40 AM 11/8/2021    10:29 AM 12/9/2021     4:30 PM  8/25/2022     1:04 PM 7/13/2023     1:55 PM 2/11/2025     9:38 AM   JUDAH-7 SCORE   Total Score  3 (minimal anxiety) 5 (mild anxiety) 7 (mild anxiety) 4 (minimal anxiety)  7 (mild anxiety)   Total Score 4 3 5 7 4 4 7        Patient-reported     CAGE-AID:       12/9/2021     4:44 PM 4/2/2025     2:59 PM   CAGE-AID Total Score   Total Score 0 0    Total Score MyChart 0 (A total score of 2 or greater is considered clinically significant) 0 (A total score of 2 or greater is considered clinically significant)       Patient-reported     PROMIS 10-Global Health (all questions and answers displayed):       12/9/2021     4:44 PM 4/2/2025     2:59 PM   PROMIS 10   In general, would you say your health is: Fair Fair   In general, would you say your quality of life is: Good Good   In general, how would you rate your physical health? Fair Fair   In general, how would you rate your mental health, including your mood and your ability to think? Fair Fair   In general, how would you rate your satisfaction with your social activities and relationships? Fair Good   In general, please rate how well you carry out your usual social activities and roles Fair Good   To what extent are you able to carry out your everyday physical activities such as walking, climbing stairs, carrying groceries, or moving a chair? Completely Completely   In the past 7 days, how often have you been bothered by emotional problems such as feeling anxious, depressed, or irritable? Often Often   In the past 7 days, how would you rate your fatigue on average? Very severe Severe   In the past 7 days, how would you rate your pain on average, where 0 means no pain, and 10 means worst imaginable pain? 2 2   In general, would you say your health is: 2 2   In general, would you say your quality of life is: 3 3   In general, how would you rate your physical health? 2 2   In general, how would you rate your mental health, including your mood and your ability to think? 2 2    In general, how would you rate your satisfaction with your social activities and relationships? 2 3   In general, please rate how well you carry out your usual social activities and roles. (This includes activities at home, at work and in your community, and responsibilities as a parent, child, spouse, employee, friend, etc.) 2 3   To what extent are you able to carry out your everyday physical activities such as walking, climbing stairs, carrying groceries, or moving a chair? 5 5   In the past 7 days, how often have you been bothered by emotional problems such as feeling anxious, depressed, or irritable? 4 4   In the past 7 days, how would you rate your fatigue on average? 5 4   In the past 7 days, how would you rate your pain on average, where 0 means no pain, and 10 means worst imaginable pain? 2 2   Global Mental Health Score 9 10    Global Physical Health Score 12 13    PROMIS TOTAL - SUBSCORES 21 23        Patient-reported     PROMIS 10-Global Health (only subscores and total score):       12/9/2021     4:44 PM 4/2/2025     2:59 PM   PROMIS-10 Scores Only   Global Mental Health Score 9 10    Global Physical Health Score 12 13    PROMIS TOTAL - SUBSCORES 21 23        Patient-reported     [place] Suicide Severity Rating Scale (Short Version)      4/3/2025     2:00 PM   [place] Suicide Severity Rating (Short Version)   1. Wish to be Dead (Since Last Contact) N   2. Non-Specific Active Suicidal Thoughts (Since Last Contact) N   Actual Attempt (Since Last Contact) N   Has subject engaged in non-suicidal self-injurious behavior? (Since Last Contact) N   Interrupted Attempts (Since Last Contact) N   Aborted or Self-Interrupted Attempt (Since Last Contact) N   Preparatory Acts or Behavior (Since Last Contact) N   Suicide (Since Last Contact) N   Calculated C-SSRS Risk Score (Since Last Contact) No Risk Indicated          Personal and Family Medical History:  Patient does report a family history of mental health  concerns.  Patient reports family history includes Bipolar Disorder (age of onset: 70) in her father; Breast Cancer in her paternal grandmother; Breast Cancer (age of onset: 60) in her mother; Cancer - colorectal in her paternal grandfather; Diabetes in her father; Hypertension in her father; Snoring in her father..     Patient does report Mental Health Diagnosis and/or Treatment.  Patient reported the following previous diagnoses which include(s): an anxiety disorder; depression .  Patient reported symptoms began 2007, pearson are always harder. Spring is usually better but isn't getting that boost this spring. The political climate is also probably playing a role.  Patient has received mental health services in the past:  therapy  .  Psychiatric Hospitalizations: none.    Patient denies a history of civil commitment.      Currently, patient none  is receiving other mental health services.         Patient has not had a physical exam to rule out medical causes for current symptoms.  Date of last physical exam was greater than a year ago and client was encouraged to schedule an exam with PCP. The patient has a [place] Primary Care Provider, who is named Rissa Serrano.  Patient reports no current medical concerns.  Patient denies any issues with pain.   There are not significant appetite / nutritional concerns / weight changes.   Patient does not report a history of head injury / trauma / cognitive impairment.      Patient reports current meds as:   Current Outpatient Medications   Medication Sig Dispense Refill    acyclovir (ZOVIRAX) 400 MG tablet Take 1 tablet (400 mg) by mouth daily. 1 tab twice daily during outbreak 90 tablet 3    acyclovir (ZOVIRAX) 400 MG tablet Take 1 tablet (400 mg) by mouth daily. , 1 tab twice daily during outbreak 60 tablet 0    FLUoxetine (PROZAC) 40 MG capsule Take 2 capsules (80 mg) by mouth daily. 60 capsule 5    levonorgestrel (KYLEENA) 19.5 MG IUD 1 each by Intrauterine route  once      LORazepam (ATIVAN) 0.5 MG tablet Take 1 tablet (0.5 mg) by mouth every 8 hours as needed for anxiety For additional refills, please schedule a follow-up appointment at [phone] 30 tablet 0    Multiple Vitamins-Minerals (MULTIVITAMIN ADULT PO)       ondansetron (ZOFRAN ODT) 4 MG ODT tab TAKE 1-2 TABLETS BY MOUTH EVERY 8 HOURS AS NEEDED FOR NAUSEA. 9 tablet 13     No current facility-administered medications for this visit.       Medication Adherence:  Patient reports taking.  .    Patient Allergies:  No Known Allergies    Medical History:    Past Medical History:   Diagnosis Date    Major depressive disorder, single episode, moderate (H) 9/07         Current Mental Status Exam:   Appearance:  Appropriate    Eye Contact:  Good   Psychomotor:  Normal       Gait / station:  not observed  Attitude / Demeanor: Cooperative   Speech      Rate / Production: Normal/ Responsive      Volume:  Normal  volume      Language:  intact  Mood:   Sad   Affect:   Appropriate    Thought Content: Clear   Thought Process: Circumstantial      Associations: No loosening of associations  Insight:   Good   Judgment:  Intact   Orientation:  All  Attention/concentration: Good    Substance Use:   Patient did report a family history of substance use concerns; see medical history section for details. Mother is an alcoholic, Mom's father also an alcoholic. Patient has not received chemical dependency treatment in the past.  Patient has not ever been to detox.      Patient is not currently receiving any chemical dependency treatment.           Substance History of use Age of first use Date of last use     Pattern and duration of use (include amounts and frequency)   Alcohol currently use   16 03/28/25 Out with friends may have 2-5 drinks    Cannabis   used in the past 19 01/01/22 Tried in the past, edibles and smoking     Amphetamines   never used     REPORTS SUBSTANCE USE: N/A   Cocaine/crack    never used       REPORTS SUBSTANCE USE:  N/A   Hallucinogens never used         REPORTS SUBSTANCE USE: N/A   Inhalants never used         REPORTS SUBSTANCE USE: N/A   Heroin never used         REPORTS SUBSTANCE USE: N/A   Other Opiates never used     REPORTS SUBSTANCE USE: N/A   Benzodiazepine   currently use 20 03/15/25 Ativan PRN for anxiety   Barbiturates never used     REPORTS SUBSTANCE USE: N/A   Over the counter meds never used     REPORTS SUBSTANCE USE: N/A   Caffeine currently use 12   Daily coffee in AM 3 cups of coffee, LG coffee on the way to work, may have a Bubbler   Nicotine  used in the past 16 01/01/22 Tried it, not consistently and decided to quit   Other substances not listed above:  Identify:  never used     REPORTS SUBSTANCE USE: N/A     Patient reported the following problems as a result of their substance use: no problems, not applicable.  .    Substance Use: No symptoms    Based on the CAGE score of 0 and clinical interview there  are not indications of drug or alcohol abuse.    Significant Losses / Trauma / Abuse / Neglect Issues:   Patient did not did not serve in the .  There are not indications or report of significant loss, trauma, abuse or neglect issues related to: are no indications and client denies any losses, trauma, abuse, or neglect concerns.  Patient has not been a victim of exploitation.  Concerns for possible neglect are not present.     Safety Assessment:   Patient denies current or past homicidal ideation and behaviors.  Patient denies current or past suicidal ideation and behaviors. Occasional passive thoughts  Patient denies current or past self-injurious behaviors.  Patient denied risk behaviors associated with substance use.  Patient denies any high risk behaviors associated with mental health symptoms.  Patient denied current or past personal safety concerns.    Patient denies past of current/recent assaultive behaviors.    Patient denied a history of sexual assault behaviors.     Patient reports there  are not   firearms in the house.    Patient reports the following protective factors:  forward or future oriented thinking; dedication to family or friends; safe and stable environment; purpose; secure attachment; help seeking behaviors when distressed; adherence with prescribed medication; living with other people; daily obligations; effective problem solving skills; commitment to well being; positive social skills; healthy fear of risky behaviors or pain    Risk Plan:  See Recommendations for Safety and Risk Management Plan    Review of Symptoms per patient report:   Depression: Feeling sad, down, or depressed, Change in sleep, and Withdrawn  [place]:  No Symptoms  Psychosis: No Symptoms  Anxiety: Nervousness, Irritability, and Anger outbursts  Panic:  No panic attacks for about a month, restless, tremors, shortness of breath  Post Traumatic Stress Disorder:  No Symptoms   Eating Disorder: No Symptoms  ADD / ADHD:  No symptoms  Conduct Disorder: No symptoms  Autism Spectrum Disorder: No symptoms  Obsessive Compulsive Disorder: No Symptoms  Personality Disorders:   Deferred    Patient reports the following compulsive behaviors and treatment history: Picking - has not had treatment..      Diagnostic Criteria:   Generalized Anxiety Disorder  A. Excessive anxiety and worry about a number of events or activities (such as work or school performance).    - Restlessness or feeling keyed up or on edge.    - Being easily fatigued.    - Irritability.   D. The focus of the anxiety and worry is not confined to features of an Axis I disorder.  E. The anxiety, worry, or physical symptoms cause clinically significant distress or impairment in social, occupational, or other important areas of functioning.   F. The disturbance is not due to the direct physiological effects of a substance (e.g., a drug of abuse, a medication) or a general medical condition (e.g., hyperthyroidism) and does not occur exclusively during a Mood Disorder,  a Psychotic Disorder, or a Pervasive Developmental Disorder. Major Depressive Disorder   - Diminished interest or pleasure in all, or almost all, activities.    - Fatigue or loss of energy.    - Diminished ability to think or concentrate, or indecisiveness.   B) The symptoms cause clinically significant distress or impairment in social, occupational, or other important areas of functioning  C) The episode is not attributable to the physiological effects of a substance or to another medical condition  D) The occurence of major depressive episode is not better explained by other thought / psychotic disorders  E) There has never been a manic episode or hypomanic episode    Functional Status:  Patient reports the following functional impairments:  home life with partner and management of the household and or completion of tasks.     Nonprogrammatic care:  Patient is requesting basic services to address current mental health concerns.    Clinical Summary:  1. Psychosocial Factors:  Interpersonal concerns, Financial strain.  Cultural and Contextual Factors: LGBTQ community  2. Principal DSM5 Diagnoses  (Sustained by DSM5 Criteria Listed Above):   296.32 (F33.1) Major Depressive Disorder, Recurrent Episode, Moderate _ and With mixed features  300.02 (F41.1) Generalized Anxiety Disorder.  3. Other Diagnoses that is relevant to services:   296.22 (F32.1)  Major Depressive Disorder, Single Episode, Moderate _ and With mixed features  300.02 (F41.1) Generalized Anxiety Disorder.  4. Provisional Diagnosis:  296.32 (F33.1) Major Depressive Disorder, Recurrent Episode, Moderate _ and With mixed features  300.02 (F41.1) Generalized Anxiety Disorder as evidenced by nervousness, irritability, restlessness, sleep issues .  5. Prognosis: Expect Improvement and Relieve Acute Symptoms.  6. Likely consequences of symptoms if not treated: Symptoms worsen, impact daily life.  7. Patient strengths include:  caring, has a previous history of  therapy, open to learning, and support of family, friends and providers .     Recommendations:     1. Plan for Safety and Risk Management:   Safety and Risk: Recommended that patient call 911 or go to the local ED should there be a change in any of these risk factors        Report to child / adult protection services was NA.     2. Patient's identified  LGBTQ informed care as desirable .     3. Initial Treatment will focus on:    Depressed Mood - lack of motivation, fatigue .     4. Resources/Service Plan:    services are not indicated.   Modifications to assist communication are not indicated.   Additional disability accommodations are not indicated.      5. Collaboration:   Collaboration / coordination of treatment will be initiated with the following  support professionals: primary care physician.      6.  Referrals:   The following referral(s) will be initiated:  None .       A Release of Information has been obtained for the following:  None .     Clinical Substantiation/medical necessity for the above recommendations:  PHQ-9 and JUDAH-7 scores.    7. TORRES:    TORRES:  Discussed the general effects of drugs and alcohol on health and well-being. Provider gave patient printed information about the  effects of chemical use on their health and well being. Recommendations:  None .     8. Records:   These were not available for review at time of assessment.   Information in this assessment was obtained from the medical record and  provided by patient who is a good historian.    Patient will have open access to their mental health medical record.    9.   Interactive Complexity: No    10. Safety Plan: No Safety plan indicated    Provider Name/ Credentials:  Bel Romo [facility]  April 1, 2025

## 2025-04-02 ASSESSMENT — PATIENT HEALTH QUESTIONNAIRE - PHQ9
10. IF YOU CHECKED OFF ANY PROBLEMS, HOW DIFFICULT HAVE THESE PROBLEMS MADE IT FOR YOU TO DO YOUR WORK, TAKE CARE OF THINGS AT HOME, OR GET ALONG WITH OTHER PEOPLE: SOMEWHAT DIFFICULT
SUM OF ALL RESPONSES TO PHQ QUESTIONS 1-9: 10
SUM OF ALL RESPONSES TO PHQ QUESTIONS 1-9: 10

## 2025-04-03 ENCOUNTER — VIRTUAL VISIT (OUTPATIENT)
Dept: PSYCHOLOGY | Facility: CLINIC | Age: 35
End: 2025-04-03
Attending: PEDIATRICS
Payer: COMMERCIAL

## 2025-04-03 DIAGNOSIS — F32.1 MODERATE SINGLE CURRENT EPISODE OF MAJOR DEPRESSIVE DISORDER (H): Primary | ICD-10-CM

## 2025-04-03 ASSESSMENT — COLUMBIA-SUICIDE SEVERITY RATING SCALE - C-SSRS
SUICIDE, SINCE LAST CONTACT: NO
1. SINCE LAST CONTACT, HAVE YOU WISHED YOU WERE DEAD OR WISHED YOU COULD GO TO SLEEP AND NOT WAKE UP?: NO
6. HAVE YOU EVER DONE ANYTHING, STARTED TO DO ANYTHING, OR PREPARED TO DO ANYTHING TO END YOUR LIFE?: NO
2. HAVE YOU ACTUALLY HAD ANY THOUGHTS OF KILLING YOURSELF?: NO
TOTAL  NUMBER OF INTERRUPTED ATTEMPTS SINCE LAST CONTACT: NO
ATTEMPT SINCE LAST CONTACT: NO
TOTAL  NUMBER OF ABORTED OR SELF INTERRUPTED ATTEMPTS SINCE LAST CONTACT: NO

## 2025-04-03 NOTE — Clinical Note
Hello,  I completed the DA on Celia today. I will be emailing the team and getting her set up with a therapist.  Bel Romo [facility]

## 2025-04-16 NOTE — PROGRESS NOTES
[facility] [place]    Behavioral Health Clinician Progress Note   Mental Health & Addiction Services          Patient Name: Celia Castro       Service Type:  Individual   Service Location:  [facility] / Email (patient reached)   Visit Start Time: 829am  Visit End Time:  851am   Session Length: 16 - 37    Attendees: Client   Service Modality: Video Visit:      Provider verified identity through the following two step process.  Patient provided:  Patient  and Patient address    Telemedicine Visit: The patient's condition can be safely assessed and treated via synchronous audio and visual telemedicine encounter.      Reason for Telemedicine Visit: Services only offered telehealth    Originating Site (Patient Location): Patient's home    Distant Site (Provider Location): Provider Remote Setting- Home Office    Consent:  The patient/guardian has verbally consented to: the potential risks and benefits of telemedicine (video visit) versus in person care; bill my insurance or make self-payment for services provided; and responsibility for payment of non-covered services.     Patient would like the video invitation sent by:  My Chart    Mode of Communication:  Video Conference via Amwell    Distant Location (Provider):  Off-site    As the provider I attest to compliance with applicable laws and regulations related to telemedicine.   Visit number: 1    [facility] Visit Activities (Refresh list every visit): [facility]     [place] Diagnostic Assessment: 4/3/25 [place] Romo [facility]  Treatment Plan Review Date: 25      DATA:    Extended Session (60+ minutes): No   Interactive Complexity: No   Crisis: No   [facility] Patient: No     Assessments completed:  The following assessments were completed by patient for this visit:  PHQ9:       2021     4:30 PM 2022     3:04 PM 2022     1:04 PM 2023     1:55 PM 2025    10:55 AM 2025     2:49 PM  4/17/2025     7:50 AM   PHQ-9 SCORE   PHQ-9 Total Score MyChart 12 (Moderate depression)  8 (Mild depression)  10 (Moderate depression) 10 (Moderate depression) 8 (Mild depression)   PHQ-9 Total Score 12 10 8 7 10  10  8        Patient-reported    Proxy-reported     GAD7:       5/19/2021     5:03 PM 8/16/2021     1:40 AM 11/8/2021    10:29 AM 12/9/2021     4:30 PM 8/25/2022     1:04 PM 7/13/2023     1:55 PM 2/11/2025     9:38 AM   JUDAH-7 SCORE   Total Score  3 (minimal anxiety) 5 (mild anxiety) 7 (mild anxiety) 4 (minimal anxiety)  7 (mild anxiety)   Total Score 4 3 5 7 4 4 7        Patient-reported     [place] Suicide Severity Rating Scale (Lifetime/Recent)      4/17/2025     8:52 AM   [place] Suicide Severity Rating (Lifetime/Recent)   1. Wish to be Dead (Lifetime) Y   1. Wish to be Dead (Past 1 Month) N   2. Non-Specific Active Suicidal Thoughts (Lifetime) N   Actual Attempt (Lifetime) N   Has subject engaged in non-suicidal self-injurious behavior? (Lifetime) N   Interrupted Attempts (Lifetime) N   Aborted or Self-Interrupted Attempt (Lifetime) N   Preparatory Acts or Behavior (Lifetime) N   Calculated C-SSRS Risk Score (Lifetime/Recent) No Risk Indicated        Reason for Visit/Presenting Concern:  Anxiety and Depression    Current Stressors / Issues:  MH update:  Depression and anxiety with panic.  This episode about 6 months.  Seasonal component .   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  .  Best friend stress.  Straight days 2 years.  Getting  in November. ENM.  Animals.  [place] are hard. Political stress. Finances  Appetite: n/a  Sleep: previous sleep study.  Hx of sleep specialist.  Outpatient Provider updates:   intake schedule therapy 28th.  Couples counseling, [facility] (out of pocket).  Hx of EMDR  SI/SIB/HI:  Hx of passive ideation without plan or intent.  Denies previous attempts.  Denies need for safety plan.  TORRES:  Social ETOH.  Tried  THC  Preg: Denies  Side effects/compliance:  Interventions:  [facility] engaged in goal and sx exploration   Most important:  Depression sx most difficult with high fatigue    Review of Symptoms per patient report:   Depression: Lack of interest or pleasure in doing things, Feeling sad, down, or depressed, Change in energy level, Change in sleep, Difficulties concentrating, Excessive or inappropriate guilt, Withdrawn, and Frequent crying  [place]:  No Symptoms  Psychosis: No Symptoms  Anxiety: Excessive worry, Nervousness, Physical complaints, such as headaches, stomachaches, muscle tension, Sleep disturbance, Poor concentration, and Irritability  Panic:  Palpitations, Shortness of breath, and Sense of impending doom  Not frequent every couple month, no triggers.  Hx of more frequent.  Post Traumatic Stress Disorder:  No Symptoms   Eating Disorder: No Symptoms  ADD / ADHD:  No symptoms  Conduct Disorder: No symptoms  Autism Spectrum Disorder: No symptoms  Obsessive Compulsive Disorder: No Symptoms    Therapeutic Interventions:  Motivational Interviewing (MI): Validated patient's thoughts, feelings and experience. Expressed respect for patient's autonomy in decision making.     Response to treatment interventions:   Patient was receptive to interventions utilized.  Patient was engaged in the therapy process.       Progress on Treatment Objective(s) / Homework:   New Objective established this session - PREPARATION (Decided to change - considering how); Intervened by negotiating a change plan and determining options / strategies for behavior change, identifying triggers, exploring social supports, and working towards setting a date to begin behavior change     Medication Review:   Changes to psychiatric medications, see updated Medication List in EPIC.      Medication Compliance:   Yes     Chemical Use Review:  Substance Use: Chemical use reviewed, no active concerns identified      Tobacco Use: No current tobacco use.        Assessment: Current Emotional / Mental Status (status of significant symptoms):    Risk status (Self / Other harm or suicidal ideation)   Patient has had a history of suicidal ideation: passive ideation without plan or intent.  Denies previous attempt    Patient denies current fears or concerns for personal safety.   Patient denies current or recent suicidal ideation or behaviors.   Patient denies current or recent homicidal ideation or behaviors.   Patient denies current or recent self injurious behavior or ideation.   Patient denies other safety concerns.   Recommended that patient call 911 or go to the local ED should there be a change in any of these risk factors      ASSESSMENT:   Mental Status:     Appearance:   Appropriate    Eye Contact:   Good    Psychomotor Behavior: Normal    Attitude:   Cooperative    Orientation:   All   Speech Rate / Production: Normal    Volume:   Normal    Mood:    Normal   Affect:    Appropriate    Thought Content:  Clear    Thought Form:  Coherent  Logical    Insight:    Good          Diagnoses:      Moderate episode of recurrent major depressive disorder (H)  JUDAH (generalized anxiety disorder)    Collateral Reports Completed:   Communicated with: Dr Harding        Plan: (Homework, other):   Patient was provided No indications of CD issues  Patient was given information about behavioral services and encouraged to schedule a follow up appointment with the clinic [facility] as needed.         Yareli Wilson, [facility], [facility]     _____________________________________________________________________________________________________________________________________                                              Individual Treatment Plan    Patient's Name: Celia Castro   YOB: 1990  Date of Creation: 4/17/25  Date Treatment Plan Last Reviewed/Revised: 4/17/25    DSM5 Diagnoses:    Moderate episode of recurrent major depressive disorder (H)  JUDAH (generalized anxiety disorder)    Psychosocial  / Contextual Factors:    Moderate episode of recurrent major depressive disorder (H)  JUDAH (generalized anxiety disorder)    PROMIS (reviewed every 90 days):   The following assessments were completed by patient for this visit:  PROMIS 10-Global Health (only subscores and total score):       12/9/2021     4:44 PM 4/2/2025     2:59 PM 4/14/2025    12:45 PM   PROMIS-10 Scores Only   Global Mental Health Score 9 10  10    Global Physical Health Score 12 13  14    PROMIS TOTAL - SUBSCORES 21 23  24        Patient-reported        Referral / Collaboration:  Referral to another professional/service is not indicated at this time..    Anticipated number of session for this episode of care:  6-9 sessions  Anticipation frequency of session: Monthly  Anticipated Duration of each session: 16-37 minutes  Treatment plan will be reviewed in 90 days or when goals have been changed.       MeasurableTreatment Goal(s) related to diagnosis / functional impairment(s)  Goal 1: Patient will reduce frequency and intensity of depressive episode(s).    I will know I've met my goal when I have more energy.      Status: New - Date: 4/17/25      Intervention(s)  [facility] will Motivational Interviewing (MI): Validate patient's thoughts, feelings and experience. Express respect for patient's autonomy in decision making.      Patient has reviewed and agreed to the above plan.     Written by  Yareli Wilson [facility], [facility]

## 2025-04-17 ENCOUNTER — VIRTUAL VISIT (OUTPATIENT)
Dept: BEHAVIORAL HEALTH | Facility: CLINIC | Age: 35
End: 2025-04-17
Payer: COMMERCIAL

## 2025-04-17 ENCOUNTER — VIRTUAL VISIT (OUTPATIENT)
Dept: PSYCHIATRY | Facility: CLINIC | Age: 35
End: 2025-04-17
Attending: PEDIATRICS
Payer: COMMERCIAL

## 2025-04-17 VITALS — HEIGHT: 66 IN | BODY MASS INDEX: 29.41 KG/M2 | WEIGHT: 183 LBS

## 2025-04-17 DIAGNOSIS — F32.1 MODERATE SINGLE CURRENT EPISODE OF MAJOR DEPRESSIVE DISORDER (H): Primary | ICD-10-CM

## 2025-04-17 DIAGNOSIS — F41.1 GAD (GENERALIZED ANXIETY DISORDER): ICD-10-CM

## 2025-04-17 DIAGNOSIS — F33.1 MODERATE EPISODE OF RECURRENT MAJOR DEPRESSIVE DISORDER (H): Primary | ICD-10-CM

## 2025-04-17 RX ORDER — SERTRALINE HYDROCHLORIDE 100 MG/1
TABLET, FILM COATED ORAL
Qty: 87 TABLET | Refills: 0 | Status: SHIPPED | OUTPATIENT
Start: 2025-04-17 | End: 2025-07-16

## 2025-04-17 ASSESSMENT — PATIENT HEALTH QUESTIONNAIRE - PHQ9
10. IF YOU CHECKED OFF ANY PROBLEMS, HOW DIFFICULT HAVE THESE PROBLEMS MADE IT FOR YOU TO DO YOUR WORK, TAKE CARE OF THINGS AT HOME, OR GET ALONG WITH OTHER PEOPLE: SOMEWHAT DIFFICULT
SUM OF ALL RESPONSES TO PHQ QUESTIONS 1-9: 8
10. IF YOU CHECKED OFF ANY PROBLEMS, HOW DIFFICULT HAVE THESE PROBLEMS MADE IT FOR YOU TO DO YOUR WORK, TAKE CARE OF THINGS AT HOME, OR GET ALONG WITH OTHER PEOPLE: SOMEWHAT DIFFICULT

## 2025-04-17 ASSESSMENT — PAIN SCALES - GENERAL: PAINLEVEL_OUTOF10: NO PAIN (0)

## 2025-04-17 NOTE — PROGRESS NOTES
"Telemedicine Visit: The patient's condition can be safely assessed and treated via synchronous audio and visual telemedicine encounter.      Reason for Telemedicine Visit: Patient has requested telehealth visit    Originating Site (Patient Location): Patient's home    Distant Location (provider location):  Off-site    Consent:  The patient/guardian has verbally consented to: the potential risks and benefits of telemedicine (video visit) versus in person care; bill my insurance or make self-payment for services provided; and responsibility for payment of non-covered services.     Mode of Communication:  Video Conference via [employer]    As the provider I attest to compliance with applicable laws and regulations related to telemedicine.                                              Outpatient Psychiatric Evaluation- Standard  Adult    Name:  Celia Castro  : 1990    Source of Referral:  Primary Care Provider: Rissa Serrano MD   Current Psychotherapist: intake schedule therapy . Couples counseling, [facility] (out of pocket). Hx of EMDR     Identifying Data:  Patient is a 34 year old, partnered / significant other  White Not  or  who presents for initial visit with me.  Patient is currently employed full time. Patient attended the phone/video session alone. Patient prefers to be called: \"Celia\"    Chief Complaint:  Patient presents with:  Consult      HPI:  Celia Castro is a 34 year old with past history including depression and anxiety who presents today for psychiatric assessment.     Patient presents today for psychiatric medication evaluation.  Patient with long history of depression and anxiety symptoms.  Patient started psychiatric medications in .  After patient graduated high school and went to college she began to have panic attacks and high anxiety.  Patient medically withdrew and started treatment which included psychiatric medications.  Thinks she might of " started with Paxil and tried a couple others before settling on fluoxetine/Prozac.  Patient has been on Prozac for over a decade.  Believes she has been on fluoxetine 80 mg daily dose for several years.  Unfortunately depression symptoms have been worsening more recently.  Does not feel fluoxetine has been as helpful for depression symptoms.  Anxiety still relatively under okay control.  Patient interested in medication changes.  No acute safety concerns.  No acute suicidality.  No problematic drug or alcohol use.  See [facility] note below and review of symptoms for additional information regarding current condition.    Psych Meds at Intake:  Fluoxetine - 80 mg daily - on for over 10 years, on 80 mg for at least 6 years, no side effects, low libido    Ativan 0.5 mg - once a month usually and if really anxious and on verge of panic, sedation but no other side effects     Past Psych Meds:  paxil - gained weight and didn't like side effects   Trazodone - helped fall asleep but still very tired during   Buspar - likely ineffective   Wellbutrin - tried as adjunct to prozac   Ambien - doesn't remember   Venlafaxine immediate release - only 37.5 mg and felt sad at bedtime       Per [facility], during today's team-based visit:  MH update:  Depression and anxiety with panic.  This episode about 6 months.  Seasonal component .   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  .  Best friend stress.  Straight days 2 years.  Getting  in November. ENM.  Animals.  [place] are hard. Political stress. Finances  Appetite: n/a  Sleep: previous sleep study.  Hx of sleep specialist.  Outpatient Provider updates:   intake schedule therapy 28th.  Couples counseling, [facility] (out of pocket).  Hx of EMDR  SI/SIB/HI:  Hx of passive ideation without plan or intent.  Denies previous attempts.  Denies need for safety plan.  TORRES:  Social ETOH.  Tried THC  Preg: Denies  Side  effects/compliance:  Interventions:  [facility] engaged in goal and sx exploration   Most important:  Depression sx most difficult with high fatigue    Past diagnoses include: MDD, JUDAH  Current medications include: has a current medication list which includes the following prescription(s): acyclovir, acyclovir, fluoxetine, levonorgestrel, lorazepam, multiple vitamin, and ondansetron.   Medication side effects: Denies  Current stressors include: Symptoms and see HPI above  Coping mechanisms and supports include: Therapy, Family, Hobbies, and Friends    Psychiatric Review of Symptoms [facility], during today's team-based visit:  Depression:     Lack of interest or pleasure in doing things, Feeling sad, down, or depressed, Change in energy level, Change in sleep, Difficulties concentrating, Excessive or inappropriate guilt, Withdrawn, and Frequent crying  Amira:             No Symptoms  Psychosis:       No Symptoms  Anxiety:           Excessive worry, Nervousness, Physical complaints, such as headaches, stomachaches, muscle tension, Sleep disturbance, Poor concentration, and Irritability  Panic:              Palpitations, Shortness of breath, and Sense of impending doom  Not frequent every couple month, no triggers.  Hx of more frequent.  Post Traumatic Stress Disorder:  No Symptoms   Eating Disorder:          No Symptoms  ADD / ADHD:              No symptoms  Conduct Disorder:       No symptoms  Autism Spectrum Disorder:     No symptoms  Obsessive Compulsive Disorder:       No Symptoms    All other ROS negative.     PHQ-9 scores:       2/13/2025    10:55 AM 4/2/2025     2:49 PM 4/17/2025     7:50 AM   PHQ-9 SCORE   PHQ-9 Total Score MyChart 10 (Moderate depression) 10 (Moderate depression) 8 (Mild depression)   PHQ-9 Total Score 10  10  8        Patient-reported    Proxy-reported       JUDAH-7 scores:        8/25/2022     1:04 PM 7/13/2023     1:55 PM 2/11/2025     9:38 AM   JUDAH-7 SCORE   Total Score 4 (minimal  anxiety)  7 (mild anxiety)   Total Score 4 4 7        Patient-reported       Medical Review of Systems:  10 systems (general, cardiovascular, respiratory, eyes, ENT, endocrine, GI, , M/S, neurological) were reviewed. Most pertinent finding(s) is/are: fatigue. The remaining systems are all unremarkable.    A 12-item WHODAS 2.0 assessment was not completed.    Psychiatric History:   Hospitalizations: None  History of Commitment? No   Past Treatment: counseling and medication(s) from physician / PCP; [facility]  Suicide Attempts: No   Current Suicide Risk: Suicide Assessment Completed Today.  Self-injurious Behavior: Denies  Electroconvulsive Therapy (ECT) or Transcranial Magnetic Stimulation (TMS): No   GeneSight Genetic Testing: No     Past medication trials include but are not limited to:   Psych Meds at Intake:  Fluoxetine - 80 mg daily - on for over 10 years, on 80 mg for at least 6 years, no side effects, low libido    Ativan 0.5 mg- once a month usually and if really anxious and on verge of panic, sedation but no other side effects     Past Psych Meds:  paxil - gained weight and didn't like side effects   Trazodone - helped fall asleep but still very tired during   Buspar - likely ineffective   Wellbutrin - tried as adjunct to prozac   Ambien - doesn't remember   Venlafaxine immediate release - only 37.5 mg and felt sad at bedtime     Substance Use History:  Current Use of Drugs/Alcohol: Social Alcohol-no problematic use; no cannabis or other drug use  Past Use of Drugs/Alcohol: No historical problematic drug or alcohol use  Patient reports no problems as a result of their drinking / drug use.   Patient has not received chemical dependency treatment in the past  Recovery Programming Involvement: None    Tobacco use: History/former    Based on the clinical interview, there  are not indications of drug or alcohol abuse. Continue to monitor.   Discussed effect of substance use on overall health.     Past Medical  History:  Past Medical History:   Diagnosis Date    Major depressive disorder, single episode, moderate (H) 9/07      Surgery:   Past Surgical History:   Procedure Laterality Date    TONSILLECTOMY  2008     Food and Medicine Allergies:   No Known Allergies  Seizures or Head Injury: No  Diet:  Not discussed  Exercise: Not discussed  Supplements: Reviewed per Electronic Medical Record Today    Current Medications:    Current Outpatient Medications:     acyclovir (ZOVIRAX) 400 MG tablet, Take 1 tablet (400 mg) by mouth daily. 1 tab twice daily during outbreak, Disp: 90 tablet, Rfl: 3    acyclovir (ZOVIRAX) 400 MG tablet, Take 1 tablet (400 mg) by mouth daily. , 1 tab twice daily during outbreak, Disp: 60 tablet, Rfl: 0    FLUoxetine (PROZAC) 40 MG capsule, Take 2 capsules (80 mg) by mouth daily., Disp: 60 capsule, Rfl: 5    levonorgestrel (KYLEENA) 19.5 MG IUD, 1 each by Intrauterine route once, Disp: , Rfl:     LORazepam (ATIVAN) 0.5 MG tablet, Take 1 tablet (0.5 mg) by mouth every 8 hours as needed for anxiety For additional refills, please schedule a follow-up appointment at [phone], Disp: 30 tablet, Rfl: 0    Multiple Vitamins-Minerals (MULTIVITAMIN ADULT PO), , Disp: , Rfl:     ondansetron (ZOFRAN ODT) 4 MG ODT tab, TAKE 1-2 TABLETS BY MOUTH EVERY 8 HOURS AS NEEDED FOR NAUSEA., Disp: 9 tablet, Rfl: 13    The [place] Prescription Monitoring Program has been reviewed and there are no concerns about diversionary activity for controlled substances at this time.   06/20/2024 06/19/2024 1 Lorazepam 0.5 Mg Tablet 30.00 10 Mo Ove 1959329 Gra (3253) 0/0 1.50 L[place] Comm Ins [place]        Vital Signs:  None since this is a phone/video visit.     Labs:  Most recent laboratory results reviewed and the pertinent results include:   Lab on 03/06/2025   Component Date Value Ref Range Status    Sodium 03/06/2025 138  135 - 145 mmol/L Final    Potassium 03/06/2025 4.3  3.4 - 5.3 mmol/L Final    Carbon Dioxide (CO2) 03/06/2025 23   22 - 29 mmol/L Final    Anion Gap 03/06/2025 11  7 - 15 mmol/L Final    Urea Nitrogen 03/06/2025 11.0  6.0 - 20.0 mg/dL Final    Creatinine 03/06/2025 0.84  0.51 - 0.95 mg/dL Final    GFR Estimate 03/06/2025 >90  >60 mL/min/1.73m2 Final    eGFR calculated using 2021 CKD-EPI equation.    Calcium 03/06/2025 9.7  8.8 - 10.4 mg/dL Final    Chloride 03/06/2025 104  98 - 107 mmol/L Final    Glucose 03/06/2025 93  70 - 99 mg/dL Final    Alkaline Phosphatase 03/06/2025 58  40 - 150 U/L Final    AST 03/06/2025 14  0 - 45 U/L Final    ALT 03/06/2025 10  0 - 50 U/L Final    Protein Total 03/06/2025 7.2  6.4 - 8.3 g/dL Final    Albumin 03/06/2025 4.5  3.5 - 5.2 g/dL Final    Bilirubin Total 03/06/2025 0.3  <=1.2 mg/dL Final    WBC Count 03/06/2025 6.2  4.0 - 11.0 10e3/uL Final    RBC Count 03/06/2025 4.36  3.80 - 5.20 10e6/uL Final    Hemoglobin 03/06/2025 13.3  11.7 - 15.7 g/dL Final    Hematocrit 03/06/2025 39.5  35.0 - 47.0 % Final    MCV 03/06/2025 91  78 - 100 fL Final    MCH 03/06/2025 30.5  26.5 - 33.0 pg Final    MCHC 03/06/2025 33.7  31.5 - 36.5 g/dL Final    RDW 03/06/2025 11.9  10.0 - 15.0 % Final    Platelet Count 03/06/2025 297  150 - 450 10e3/uL Final    % Neutrophils 03/06/2025 65  % Final    % Lymphocytes 03/06/2025 24  % Final    % Monocytes 03/06/2025 9  % Final    % Eosinophils 03/06/2025 1  % Final    % Basophils 03/06/2025 1  % Final    % Immature Granulocytes 03/06/2025 0  % Final    NRBCs per 100 WBC 03/06/2025 0  <1 /100 Final    Absolute Neutrophils 03/06/2025 4.0  1.6 - 8.3 10e3/uL Final    Absolute Lymphocytes 03/06/2025 1.5  0.8 - 5.3 10e3/uL Final    Absolute Monocytes 03/06/2025 0.6  0.0 - 1.3 10e3/uL Final    Absolute Eosinophils 03/06/2025 0.1  0.0 - 0.7 10e3/uL Final    Absolute Basophils 03/06/2025 0.0  0.0 - 0.2 10e3/uL Final    Absolute Immature Granulocytes 03/06/2025 0.0  <=0.4 10e3/uL Final    Absolute NRBCs 03/06/2025 0.0  10e3/uL Final   Lab on 09/09/2024   Component Date Value Ref Range  Status    Sodium 09/09/2024 137  135 - 145 mmol/L Final    Potassium 09/09/2024 4.1  3.4 - 5.3 mmol/L Final    Chloride 09/09/2024 102  98 - 107 mmol/L Final    Carbon Dioxide (CO2) 09/09/2024 27  22 - 29 mmol/L Final    Anion Gap 09/09/2024 8  7 - 15 mmol/L Final    Urea Nitrogen 09/09/2024 9.9  6.0 - 20.0 mg/dL Final    Creatinine 09/09/2024 0.89  0.51 - 0.95 mg/dL Final    GFR Estimate 09/09/2024 86  >60 mL/min/1.73m2 Final    eGFR calculated using 2021 CKD-EPI equation.    Calcium 09/09/2024 9.1  8.8 - 10.4 mg/dL Final    Reference intervals for this test were updated on 7/16/2024 to reflect our healthy population more accurately. There may be differences in the flagging of prior results with similar values performed with this method. Those prior results can be interpreted in the context of the updated reference intervals.    Glucose 09/09/2024 91  70 - 99 mg/dL Final    Patient Fasting > 8hrs? 09/09/2024 No   Final    Cholesterol 09/09/2024 207 (H)  <200 mg/dL Final    Triglycerides 09/09/2024 106  <150 mg/dL Final    Direct Measure HDL 09/09/2024 72  >=50 mg/dL Final    LDL Cholesterol Calculated 09/09/2024 114 (H)  <100 mg/dL Final    Non HDL Cholesterol 09/09/2024 135 (H)  <130 mg/dL Final    Patient Fasting > 8hrs? 09/09/2024 No   Final    TSH 09/09/2024 0.71  0.30 - 4.20 uIU/mL Final    WBC Count 09/09/2024 5.0  4.0 - 11.0 10e3/uL Final    RBC Count 09/09/2024 4.45  3.80 - 5.20 10e6/uL Final    Hemoglobin 09/09/2024 13.7  11.7 - 15.7 g/dL Final    Hematocrit 09/09/2024 40.6  35.0 - 47.0 % Final    MCV 09/09/2024 91  78 - 100 fL Final    MCH 09/09/2024 30.8  26.5 - 33.0 pg Final    MCHC 09/09/2024 33.7  31.5 - 36.5 g/dL Final    RDW 09/09/2024 11.9  10.0 - 15.0 % Final    Platelet Count 09/09/2024 282  150 - 450 10e3/uL Final    Neisseria gonorrhoeae 09/09/2024 Negative  Negative Final    Negative for N. gonorrhoeae rRNA by transcription mediated amplification. A negative result by transcription mediated  amplification does not preclude the presence of C. trachomatis infection because results are dependent on proper and adequate collection, absence of inhibitors and sufficient rRNA to be detected.    Chlamydia trachomatis 09/09/2024 Negative  Negative Final    A negative result by transcription mediated amplification does not preclude the presence of C. trachomatis infection because results are dependent on proper and adequate collection, absence of inhibitors and sufficient rRNA to be detected.    HIV Antigen Antibody Combo 09/09/2024 Nonreactive  Nonreactive Final    Negative HIV-1 p24 antigen and HIV-1/2 antibody screening test results usually indicate the absence of HIV-1 and HIV-2 infection. However, such negative results do not rule-out acute HIV infection.  If acute HIV-1 or HIV-2 infection is suspected, detection of HIV-1 or HIV-2 RNA  is recommended.     Hepatitis C Antibody 09/09/2024 Nonreactive  Nonreactive Final    A nonreactive screening test result does not exclude the possibility of exposure to or infection with HCV. Nonreactive screening test results in individuals with prior exposure to HCV may be due to antibody levels below the limit of detection of this assay or lack of reactivity to the HCV antigens used in this assay. Patients with recent HCV infections (<3 months from time of exposure) may have false-negative HCV antibody results due to the time needed for seroconversion (average of 8 to 9 weeks).   Office Visit on 09/09/2024   Component Date Value Ref Range Status    Human Papilloma Virus 16 DNA 09/09/2024 Negative  Negative Final    Human Papilloma Virus 18 DNA 09/09/2024 Negative  Negative Final    Human Papilloma Virus Other 09/09/2024 Negative  Negative Final    FINAL DIAGNOSIS 09/09/2024    Final                    Value:This patient's sample is negative for high risk HPV DNA.                                                                                                                                   METHODOLOGY: The [facility] system uses automated extraction, simultaneous                           amplification of HPV (E6/E7 oncogenes) and beta-globin, followed by real                           time detection of fluorescent labeled HPV and beta globin using specific                           oligonucleotide probes. The test specifically identifies types HPV 16 DNA                           and HPV 18 DNA while concurrently detecting the rest of the high risk                           types (31, 33, 35, 39, 45, 51, 52, 56, 58, 59, 66 or 68).                                                     COMMENTS: This test is not intended for use as a screening device for                           woman under age 30 with normal cervical cytology. Results should be                           correlated with cytologic and histologic findings. Close clinical follow                           up is recommended.                                                    Please see the separate Gynecologic Cytology (Pap) report from the same                           collection date.    Interpretation 09/09/2024 Negative for Intraepithelial Lesion or Malignancy (NILM)    Final    Comment 09/09/2024    Final                    Value:                          Papanicolaou Test Limitations:  Cervical cytology is a screening test with                           limited sensitivity, and regular screening is critical for cancer                           prevention.  Pap tests are primarily effective for the                           diagnosis/prevention of squamous cell carcinoma, not adenocarcinoma or                           other cancers.                              Specimen Adequacy 09/09/2024 Satisfactory for evaluation, endocervical/transformation zone component present   Final    Clinical Information 09/09/2024    Final                    Value:IUD    Previous Abnormal? 09/09/2024    Final                    Value:No     Performing Labs 09/09/2024    Final                    Value:The technical component of this testing was completed at [facility].                                                    Stain controls for all stains resulted within this report have been                           reviewed and show appropriate reactivity.    Associated HPV Report 09/09/2024    Final                    Value:Please see the associated HPV High Risk Types DNA Cervical report for                           Specimen [id number] from the same collection date.     No EKG on file.     Family History:   Patient reported family history includes:   Family History   Problem Relation Age of Onset    Breast Cancer Mother 60    Diabetes Father     Hypertension Father     Bipolar Disorder Father 70    Snoring Father     Breast Cancer Paternal Grandmother     Cancer - colorectal Paternal Grandfather      Mental Illness History: Yes: Per EPIC Electronic Medical Record  Substance Abuse History: Mom- ETOH; MGF- ETOH  Suicide History: Unknown  Medications: Unknown     Social History:    Social History                [per patient report]   Financial- What are your current financial sources?: (Patient-Rptd) employment;partner, Does your finances cause stress?: (Patient-Rptd) does  Employment- What is your employment status?: (Patient-Rptd) employed fulltime, Able to function?: (Patient-Rptd) yes, If you work in a paying job or as a volunteer, describe the job and how long you have held it: : (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 : (Patient-Rptd) has a partner or significant other, What is your sexual orientation?: (Patient-Rptd) bi-sexual  Children- Do you have children?: (Patient-Rptd) no  Social/spiritual support- Who are the most supportive people in your life?  : (Patient-Rptd) partner;siblings;pets;friends;therapist  Cultural- What is your cultural background? : (Patient-Rptd) , What are ethnic, cultural, or Evangelical influences that may be useful to know about you (for example history of experiencing discrimination, growing up rural/urban, valuing culturally specific treatments)?  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  : (Patient-Rptd) English  Education- What is your highest education? : (Patient-Rptd) college graduate  Early history- Where did you grow up?: (Patient-Rptd) other, If other, please list below:: (Patient-Rptd) [place], [place], Who took care of you as a child?: (Patient-Rptd) biological parents  Raised by- How would you describe your parent's relationships?: (Patient-Rptd) were always together  Siblings- Do you have siblings?: (Patient-Rptd) yes, How many full siblings do you have?: (Patient-Rptd) 2  Quality of family relationships- How would you describe your current family relationships?: (Patient-Rptd) fair  Legal- Have you been involved with the legal system (child custody, order for protection, DWI, etc.)?: (Patient-Rptd) have not, Do you have a ?  : (Patient-Rptd) does not    Firearms/Weapons Access: No: Patient denies   Service: No    Legal History:  No: Patient denies any legal history    Significant Losses / Trauma / Abuse / Neglect Issues:  There are indications or report of significant loss, trauma, abuse or neglect issues related to: are no indications and client denies any losses, trauma, abuse, or neglect concerns.    Issues of possible neglect are not present.     Comprehensive Examination (limited due to virtual visit format, phone/video):  Vital Signs:  Vitals: There were no vitals taken for this visit.  General/Constitutional:  Appearance: awake, alert, adequately groomed, appeared stated age and no apparent distress  Attitude:  cooperative, pleasant  Eye Contact:  good  Musculoskeletal:  Muscle Strength and Tone: no gross abnormalities by observation  Psychomotor Behavior:  no evidence of tardive dyskinesia, dystonia, or tics  Gait and Station: normal, no gross abnormalities noted by observation  Psychiatric:  Speech:  clear, coherent, regular rate, rhythm, and volume  Associations:  no loose associations  Thought Process:  logical, linear and goal oriented  Thought Content:  no evidence of suicidal ideation or homicidal ideation, no evidence of psychotic thought, no auditory hallucinations present and no visual hallucinations present  Mood:  depressed  Affect:  mood congruent  Insight:  good  Judgment:  intact, adequate for safety  Impulse Control:  intact  Neurological:  Oriented to:  person, place, time, and situation  Attention Span and Concentration:  normal  Language: intact  Recent and Remote Memory:  Intact to interview. Not formally assessed. No amnesia.  Fund of Knowledge: appropriate    Strengths and Opportunities Per [facility], during today's team-based visit:  Celia Castro identified the following strengths or resources that may help she succeed in counseling: friends / good social support, intelligence, and positive work environment. Things that may interfere with the patient's success include:  none noted at this time.      Suicide Risk Assessment:  Today Celia Castro reports no suicidal ideation. Based on all available evidence including the factors cited above, Celia Castro does not appear to be at imminent risk for self-harm, does not meet criteria for a 72-hr hold, and therefore remains  appropriate for ongoing outpatient level of care.  A thorough assessment of risk factors related to suicide and self-harm have been reviewed and are noted above. The patient convincingly denies acute suicidality on several occasions. Local community safety resources were provided for patient to use if needed. There was no deceit detected, and the patient presented in a manner that was believable.     DSM5  Diagnosis:  296.32 (F33.1) Major Depressive Disorder, Recurrent Episode, Moderate _  300.02 (F41.1) Generalized Anxiety Disorder    Medical Comorbidities Include:   Patient Active Problem List    Diagnosis Date Noted    Moderate single current episode of major depressive disorder (H) 07/11/2017     Priority: Medium    Anxiety 12/15/2010     Priority: Medium     4/11/11 JUDAH score 9      CARDIOVASCULAR SCREENING; LDL GOAL LESS THAN 160 10/31/2010     Priority: Medium       Impression:  Celia Castro is a 34-year-old with past psychiatric history including depression and anxiety who presents today for psychiatric evaluation in the context of worsening depression symptoms.  Patient feels like depression symptoms have been worsening more recently.  Increasing fatigue and worsening pre-existing sleep problems.  Patient has been on fluoxetine for over a decade and has been on max recommended dose for at least several years.  Patient with brief, and likely inadequate, trial with Wellbutrin as an adjunct to Prozac.  Given fluoxetine likely no longer conferring much benefit, patient agreeable to changing to a new antidepressant.  Patient with possible pregnancy plan soon and so we discussed sertraline as a good option.  Patient agreeable to start sertraline.  Could consider Viibryd as a backup option, although we did discuss there is limited data in humans for pregnancy.  Patient would also like to look into options like ketamine and TMS so resources sent today in [employer].  Rare use of lorazepam.  No concerning drug or  alcohol use.  No suicidal ideation or safety concerns.    Medication side effects and alternatives reviewed. Health promotion activities recommended and reviewed today. All questions addressed. Education and counseling completed regarding risks and benefits of medications and psychotherapy options. Recommend therapy for additional support.     Treatment Plan:  Continue lorazepam/Ativan every 8 hours as needed for severe anxiety.  Discontinue fluoxetine/Prozac and start sertraline/Zoloft:  Days 1-7  Decrease fluoxetine by alternating 40 with 80 mg every other day  Days 8-14  Decrease fluoxetine to 40 mg daily  Start sertraline 50 mg daily  Days 15-21  Decrease fluoxetine to 20 mg daily  Increase sertraline to 100 mg daily  Days 16+  Stop fluoxetine  Continue on sertraline 100 mg daily   Okay to increase up to 150 mg daily after a couple weeks on 100 mg daily if still room for improvement and if tolerating well  See [employer] message for more information on ketamine and TMS  Continue therapy as planned.  Continue all other cares per primary care provider.   Continue all other medications as reviewed per electronic medical record today.   Safety plan reviewed. To the Emergency Department as needed or call after hours crisis line at [phone] or [phone]. [place] Crisis Text Line: Text [place] to 062655  or  Suicide LifeLine Chat: suicidepreventionlifeline.org/chat  Schedule an appointment with me in 6-8 weeks or sooner as needed.  Call [facility] at [phone] to schedule.  Follow up with primary care provider as planned or sooner if needed for acute medical concerns.  Call the psychiatric nurse line with medication questions or concerns at [phone].  [employer] may be used to communicate with your provider, but this is not intended to be used for emergencies.    Patient Education:  Risks of benzodiazepine (Ativan, Xanax, Klonopin, Valium, etc) use including, but not limited to, sedation,  tolerance, risk for addiction/dependence. Do not drink alcohol while taking benzodiazepines due to risk of trouble breathing and potential death. Do not drive or operate heavy machinery until it is known how the drug affects you. Discuss with physician or pharmacist before ever taking a benzodiazepine with a narcotic/opioid pain medication.     [facility]- Psychiatric Disorders During Pregnancy  [email or web address]://[email or web address]/specialty-[email or web address]/psychiatric-disorders-[email or web address]-pregnancy    MothertoBaby  Https://[email or web address]    Sertraline:  https://[email or web address]/fact-sheets/sertraline-zoloft-pregnancy/    [facility] (Mother-Baby Programs):  [email or web address]    Risks and Benefits of use of medications during pregnancy and breastfeeding were reviewed.   Most common adverse effects of medications that were discussed include but are not limited to: fetal malformations, lung problems requiring support at birth associated with selective serotonin reuptake inhibitor use, low birth weight, and symptoms related to withdrawal of medication in infant (poor feeding, sleep disruptions, restlessness, etc).      Get Out of Your Mind & Into Your Life   By Santiago Oh    The Happiness Trap: How to Stop Struggling and Start Living  By Kevin Hartman    The Reality Slap: Finding Peace & Fulfillment When Life Hurts  By Kevin Hartman    Things Might Go Terribly, Horribly Wrong: A Guide to Life Liberated from Anxiety  By Virginia Nj, PhD    Stress Less, Live More: How Acceptance and Commitment Therapy Can Help You Live a Busy Yet Balanced Life  By Yuri Acosta    Finding Life Beyond Trauma: Using Acceptance and Commitment Therapy to Heal From Post-Traumatic Stress and Trauma-Related Problems  By Sushila Jones and Martha Gold    Other ACT Skills References     Kevin Hartman on YouTube - Demonstrates several different skills to deal with difficult  "thoughts and feelings     Book:  \"A Liberated Mind: How to Pivot Toward What Matters\" by Santiago Oh     Psychological flexibility: How love turns pain into purpose  Tedx Talk by Dr. Oh discussing his struggle with anxiety and panic disorder which motivated him to develop ACT therapy (Acceptance and Commitment Therapy)     You Are Not Your Thoughts      Care team has reviewed attendance agreement with patient. Patient advised that two failed appointments within 6 months may lead to termination of current episode of care.     Community Resources:    National Suicide Prevention Lifeline: 784.282.1155 (TTY: 178.577.4010). Call anytime for help.  (www.suicidepreventionlifeline.org)  National [place] on Mental Illness (www.traci.org): 677.888.6960 or 389-926-2197.   Mental Health Association (www.mentalhealth.org): 894.744.4875 or 668-727-9370.  [place] Crisis Text Line: Text [place] to 613607  Suicide LifeLine Chat: [employer].org/chat    Administrative Billing:   Phone Call/Video Duration: 24 minutes  Start: 9:05 AM  Stop: 9:29 AM    The longitudinal plan of care for the diagnosis(es)/condition(s) as documented were addressed during this visit. Due to the added complexity in care, I will continue to support Celia in the subsequent management and with ongoing continuity of care.    Episode of Care #: 1    Patient Status:  Patient will continue to be seen for ongoing consultation and stabilization.    Signed:   Verónica Harding DO  [facility]    Disclaimer: This note consists of symbols derived from keyboarding, dictation and/or voice recognition software. As a result, there may be errors in the script that have gone undetected. Please consider this when interpreting information found in this chart.    "

## 2025-04-17 NOTE — PATIENT INSTRUCTIONS
Treatment Plan:  Continue lorazepam/Ativan every 8 hours as needed for severe anxiety.  Discontinue fluoxetine/Prozac and start sertraline/Zoloft:  Days 1-7  Decrease fluoxetine by alternating 40 with 80 mg every other day  Days 8-14  Decrease fluoxetine to 40 mg daily  Start sertraline 50 mg daily  Days 15-21  Decrease fluoxetine to 20 mg daily  Increase sertraline to 100 mg daily  Days 16+  Stop fluoxetine  Continue on sertraline 100 mg daily   Okay to increase up to 150 mg daily after a couple weeks on 100 mg daily if still room for improvement and if tolerating well  See [employer] message for more information on ketamine and TMS  Continue therapy as planned.  Continue all other cares per primary care provider.   Continue all other medications as reviewed per electronic medical record today.   Safety plan reviewed. To the Emergency Department as needed or call after hours crisis line at [phone] or [phone]. [place] Crisis Text Line: Text [place] to 673567  or  Suicide LifeLine Chat: suicidepreventionSUB ONE [email or web address].org/chat  Schedule an appointment with me in 6-8 weeks or sooner as needed.  Call [facility] at [phone] to schedule.  Follow up with primary care provider as planned or sooner if needed for acute medical concerns.  Call the psychiatric nurse line with medication questions or concerns at [phone].  [employer] may be used to communicate with your provider, but this is not intended to be used for emergencies.    Patient Education:  Risks of benzodiazepine (Ativan, Xanax, Klonopin, Valium, etc) use including, but not limited to, sedation, tolerance, risk for addiction/dependence. Do not drink alcohol while taking benzodiazepines due to risk of trouble breathing and potential death. Do not drive or operate heavy machinery until it is known how the drug affects you. Discuss with physician or pharmacist before ever taking a benzodiazepine with a narcotic/opioid pain medication.     Center  "for Women's Mental Health- Psychiatric Disorders During Pregnancy  https://[email or web address]/specialty-clinics/psychiatric-disorders-during-pregnancy    MothertoBaby  Https://mothertobaby.org    Sertraline:  https://mothertobaby.org/fact-sheets/sertraline-zoloft-pregnancy/    [facility] (Mother-Baby Programs):  https://[place].[facility]/services/    Risks and Benefits of use of medications during pregnancy and breastfeeding were reviewed.   Most common adverse effects of medications that were discussed include but are not limited to: fetal malformations, lung problems requiring support at birth associated with selective serotonin reuptake inhibitor use, low birth weight, and symptoms related to withdrawal of medication in infant (poor feeding, sleep disruptions, restlessness, etc).      Get Out of Your Mind & Into Your Life   By Santiago Oh    The Happiness Trap: How to Stop Struggling and Start Living  By Kevin Hartman    The Reality Slap: Finding Peace & Fulfillment When Life Hurts  By Kevin Hartman    Things Might Go Terribly, Horribly Wrong: A Guide to Life Liberated from Anxiety  By Virginia Nj, PhD    Stress Less, Live More: How Acceptance and Commitment Therapy Can Help You Live a Busy Yet Balanced Life  By Yuri Acosta    Finding Life Beyond Trauma: Using Acceptance and Commitment Therapy to Heal From Post-Traumatic Stress and Trauma-Related Problems  By Sushila Jones and Martha Gold    Other ACT Skills References     Kevin Hartman on [employer] - Demonstrates several different skills to deal with difficult thoughts and feelings     Book:  \"A Liberated Mind: How to Pivot Toward What Matters\" by Santiago Oh     Psychological flexibility: How love turns pain into purpose  Tedx Talk by Dr. Oh discussing his struggle with anxiety and panic disorder which motivated him to develop ACT therapy (Acceptance and Commitment Therapy)     You Are Not Your " Thoughts      Care team has reviewed attendance agreement with patient. Patient advised that two failed appointments within 6 months may lead to termination of current episode of care.     Community Resources:    National Suicide Prevention Lifeline: 975.569.8290 (TTY: 546.765.9137). Call anytime for help.  (www.suicidepreventionlifeline.org)  National [place] on Mental Illness (www.traci.org): 219.630.1491 or 640-903-1109.   Mental Health Association (www.mentalhealth.org): 814.926.1834 or 785-778-1679.  [place] Crisis Text Line: Text [place] to 189004  Suicide LifeLine Chat: suicideNovast.org/chat      Patient Education   Collaborative Care Psychiatry Service  What to Expect  Here's what to expect from your Collaborative Care Psychiatry Service (CCPS).   About CCPS  CCPS means 2 people work together to help you get better. You'll meet with a behavioral health clinician and a psychiatric doctor. A behavioral health clinician helps people with mental health problems by talking with them. A psychiatric doctor helps people by giving them medicine.  How it works  At every visit, you'll see the behavioral health clinician (BHC) first. They'll talk with you about how you're doing and teach you how to feel better.   Then you'll see the psychiatric doctor. This doctor can help you deal with troubling thoughts and feelings by giving you medicine. They'll make sure you know the plan for your care.   CCPS usually takes 3 to 6 visits. If you need more visits, we may have you start seeing a different psychiatric doctor for ongoing care.  If you have any questions or concerns, we'll be glad to talk with you.  About visits  Be open  At your visits, please talk openly about your problems. It may feel hard, but it's the best way for us to help you.  Cancelling visits  If you can't come to your visit, please call us right away at [phone]. If you don't cancel at least 24 hours (1 full day) before your visit, that's  "\"late cancellation.\"  Being late to visits  Being very late is the same as not showing up. You will be a \"no show\" if:  Your appointment starts with a C, and you're more than 15 minutes late for a 30-minute (half hour) visit. This will also cancel your appointment with the psychiatric doctor.  Your appointment is with a psychiatric doctor only, and you're more than 15 minutes late for a 30-minute (half hour) visit.  Your appointment is with a psychiatric doctor only, and you're more than 30 minutes late for a 60-minute (full hour) visit.  If you cancel late or don't show up 2 times within 6 months, we may end your care.   Getting help between visits  If you need help between visits, you can call us Monday to Friday from 8 a.m. to 4:30 p.m. at [phone].  Emergency care  Call 911 or go to the nearest emergency department if your life or someone else's life is in danger.  Call 618 anytime to reach the [facility] Suicide and Crisis hotline.  Medicine refills  To refill your medicine, call your pharmacy. You can also call [facility]'s Behavioral Access at [phone], Monday to Friday, 8 a.m. to 4:30 p.m. It can take 1 to 3 business days to get a refill.   Forms, letters, and tests  You may have papers to fill out, like FMLA, short-term disability, and workability. We can help you with these forms at your visits, but you must have an appointment. You may need more than 1 visit for this, to be in an intensive therapy program, or both.  Before we can give you medicine for ADHD, we may refer you to get tested for it or confirm it another way.  We may not be able to give you an emotional support animal letter.  We don't do mental health checks ordered by the court.   We don't do mental health testing, but we can refer you to get tested.   Thank you for choosing us for your care.  For informational purposes only. Not to replace the advice of your health care provider. Copyright   2022 [facility]. " All rights reserved. [employer] 914900 - Rev 11/24.

## 2025-04-17 NOTE — PROGRESS NOTES
"Virtual Visit Details    Type of service:  Video Visit     Originating Location (pt. Location): {video visit patient location:135750::\"Home\"}  {PROVIDER LOCATION On-site should be selected for visits conducted from your clinic location or adjoining [facility] hospital, academic office, or other nearby [facility] building. Off-site should be selected for all other provider locations, including home:718859}  Distant Location (provider location):  {virtual location provider:258531}  Platform used for Video Visit: {Virtual Visit Platforms:330310::\"[employer] (MCG)\"}  "

## 2025-04-17 NOTE — NURSING NOTE
Current patient location: [facility] [address]  [facility] [address]    Is the patient currently in the state of [place]? YES    Visit mode: VIDEO    If the visit is dropped, the patient can be reconnected by:VIDEO VISIT: Send to e-mail at: [email or web address]    Will anyone else be joining the visit? NO  (If patient encounters technical issues they should call [phone])    Are changes needed to the allergy or medication list? No    Are refills needed on medications prescribed by this physician? NO    Rooming Documentation:  Patient will complete questionnaire(s) in [employer]    Reason for visit: Consult    Asya AVENDANO

## 2025-04-28 ENCOUNTER — VIRTUAL VISIT (OUTPATIENT)
Facility: CLINIC | Age: 35
End: 2025-04-28
Payer: COMMERCIAL

## 2025-04-28 DIAGNOSIS — F32.1 MODERATE SINGLE CURRENT EPISODE OF MAJOR DEPRESSIVE DISORDER (H): Primary | ICD-10-CM

## 2025-04-28 DIAGNOSIS — F41.1 GAD (GENERALIZED ANXIETY DISORDER): ICD-10-CM

## 2025-04-28 PROCEDURE — 90834 PSYTX W PT 45 MINUTES: CPT | Mod: 95 | Performed by: SOCIAL WORKER

## 2025-04-28 ASSESSMENT — COLUMBIA-SUICIDE SEVERITY RATING SCALE - C-SSRS
1. IN THE PAST MONTH, HAVE YOU WISHED YOU WERE DEAD OR WISHED YOU COULD GO TO SLEEP AND NOT WAKE UP?: NO
2. HAVE YOU ACTUALLY HAD ANY THOUGHTS OF KILLING YOURSELF?: NO
1. HAVE YOU WISHED YOU WERE DEAD OR WISHED YOU COULD GO TO SLEEP AND NOT WAKE UP?: NO
6. HAVE YOU EVER DONE ANYTHING, STARTED TO DO ANYTHING, OR PREPARED TO DO ANYTHING TO END YOUR LIFE?: NO
ATTEMPT LIFETIME: NO
TOTAL  NUMBER OF INTERRUPTED ATTEMPTS LIFETIME: NO
TOTAL  NUMBER OF ABORTED OR SELF INTERRUPTED ATTEMPTS LIFETIME: NO

## 2025-04-28 NOTE — PROGRESS NOTES
[facility]                                     Progress Note    Patient Name: Celia Castro  Date: 2025         Service Type: Individual      Session Start Time: 9:00  Session End Time: 9:48     Session Length: 48    Session #: 1    Attendees: Client attended alone    Service Modality:  Video Visit:      Provider verified identity through the following two step process.  Patient provided:  Patient  and Patient address    Telemedicine Visit: The patient's condition can be safely assessed and treated via synchronous audio and visual telemedicine encounter.      Reason for Telemedicine Visit: Patient has requested telehealth visit    Originating Site (Patient Location): Patient's home    Distant Site (Provider Location): [facility]    Consent:  The patient/guardian has verbally consented to: the potential risks and benefits of telemedicine (video visit) versus in person care; bill my insurance or make self-payment for services provided; and responsibility for payment of non-covered services.     Patient would like the video invitation sent by:  My Chart    Mode of Communication:  Video Conference via Amwell    Distant Location (Provider):  On-site    As the provider I attest to compliance with applicable laws and regulations related to telemedicine.    DATA  Interactive Complexity: No  Crisis: No        Progress Since Last Session (Related to Symptoms / Goals / Homework):   Symptoms:  discussed struggles with sleep, noted high anxiety and depression - reports not getting out of bed on weekends    Homework:  1st session      Episode of Care Goals: Achieved / completed to satisfaction - CONTEMPLATION (Considering change and yet undecided); Intervened by assessing the negative and positive thinking (ambivalence) about behavior change     Current / Ongoing Stressors and Concerns:   More depressed recently - more depressed in  winter months   [place] in relationship which started them in couple's therapy   Derek from [place] and stress with contract negotiations   Political events   Planning wedding, moving and discussions around having kids  Struggles to understand her own anxiety - notes picking at nails as avoidance     Treatment Objective(s) Addressed in This Session:   Develop rapport  Create treatment plan     Intervention:   ACT: Spoke with patient on what has led her to therapy and past experiences with therapy.  Discussed history of anxiety, panic and depression.  Created treatment plan that focuses on patient's difficulties with motivation.    Assessments completed prior to visit:  The following assessments were completed by patient for this visit:  PHQ9:       12/9/2021     4:30 PM 5/18/2022     3:04 PM 8/25/2022     1:04 PM 7/13/2023     1:55 PM 2/13/2025    10:55 AM 4/2/2025     2:49 PM 4/17/2025     7:50 AM   PHQ-9 SCORE   PHQ-9 Total Score MyChart 12 (Moderate depression)  8 (Mild depression)  10 (Moderate depression) 10 (Moderate depression) 8 (Mild depression)   PHQ-9 Total Score 12 10 8 7 10  10  8        Patient-reported    Proxy-reported     GAD7:       5/19/2021     5:03 PM 8/16/2021     1:40 AM 11/8/2021    10:29 AM 12/9/2021     4:30 PM 8/25/2022     1:04 PM 7/13/2023     1:55 PM 2/11/2025     9:38 AM   JUDAH-7 SCORE   Total Score  3 (minimal anxiety) 5 (mild anxiety) 7 (mild anxiety) 4 (minimal anxiety)  7 (mild anxiety)   Total Score 4 3 5 7 4 4 7        Patient-reported     PROMIS 10-Global Health (all questions and answers displayed):       12/9/2021     4:44 PM 4/2/2025     2:59 PM 4/14/2025    12:45 PM 4/17/2025     7:52 AM   PROMIS 10   In general, would you say your health is: Fair Fair Good Good   In general, would you say your quality of life is: Good Good Good Good   In general, how would you rate your physical health? Fair Fair Good Good   In general, how would you rate your mental health,  including your mood and your ability to think? Fair Fair Fair Fair   In general, how would you rate your satisfaction with your social activities and relationships? Fair Good Good Fair   In general, please rate how well you carry out your usual social activities and roles Fair Good Good Good   To what extent are you able to carry out your everyday physical activities such as walking, climbing stairs, carrying groceries, or moving a chair? Completely Completely Completely Completely   In the past 7 days, how often have you been bothered by emotional problems such as feeling anxious, depressed, or irritable? Often Often Often Often   In the past 7 days, how would you rate your fatigue on average? Very severe Severe Severe Severe   In the past 7 days, how would you rate your pain on average, where 0 means no pain, and 10 means worst imaginable pain? 2 2 2 2   In general, would you say your health is: 2 2 3 3   In general, would you say your quality of life is: 3 3 3 3   In general, how would you rate your physical health? 2 2 3 3   In general, how would you rate your mental health, including your mood and your ability to think? 2 2 2 2   In general, how would you rate your satisfaction with your social activities and relationships? 2 3 3 2   In general, please rate how well you carry out your usual social activities and roles. (This includes activities at home, at work and in your community, and responsibilities as a parent, child, spouse, employee, friend, etc.) 2 3 3 3   To what extent are you able to carry out your everyday physical activities such as walking, climbing stairs, carrying groceries, or moving a chair? 5 5 5 5   In the past 7 days, how often have you been bothered by emotional problems such as feeling anxious, depressed, or irritable? 4 4 4 4   In the past 7 days, how would you rate your fatigue on average? 5 4 4 4   In the past 7 days, how would you rate your pain on average, where 0 means no pain, and  10 means worst imaginable pain? 2 2 2 2   Global Mental Health Score 9 10  10  9    Global Physical Health Score 12 13  14  14    PROMIS TOTAL - SUBSCORES 21 23  24  23        Patient-reported         ASSESSMENT: Current Emotional / Mental Status (status of significant symptoms):   Risk status (Self / Other harm or suicidal ideation)   Patient denies current fears or concerns for personal safety.   Patient denies current or recent suicidal ideation or behaviors.   Patient denies current or recent homicidal ideation or behaviors.   Patient denies current or recent self injurious behavior or ideation.   Patient denies other safety concerns.   Patient reports there has been no change in risk factors since their last session.     Patient reports there has been no change in protective factors since their last session.     Recommended that patient call 911 or go to the local ED should there be a change in any of these risk factors     Appearance:   Appropriate    Eye Contact:   Good    Psychomotor Behavior: Normal    Attitude:   Cooperative    Orientation:   All   Speech    Rate / Production: Normal/ Responsive    Volume:  Normal    Mood:    Depressed    Affect:    Subdued    Thought Content:  Clear    Thought Form:  Coherent  Logical    Insight:    Good      Medication Review:   Changes to psychiatric medications, see updated Medication List in EPIC.      Medication Compliance:   Yes     Changes in Health Issues:   None reported     Chemical Use Review:   Substance Use: Chemical use reviewed, no active concerns identified      Tobacco Use: No current tobacco use.      Diagnosis:  1. Moderate single current episode of major depressive disorder (H)    2. JUDAH (generalized anxiety disorder)        Collateral Reports Completed:   Not Applicable    PLAN: (Patient Tasks / Therapist Tasks / Other)  Encouraged patient to bring experiences to therapy to help with getting what she wants    Use PHQ-9 to discuss experiences next time  - psycho-education - do short-term/long-term what helps    ELVER Saldaña                                                         ______________________________________________________________________    Individual Treatment Plan    Patient's Name: Celia Castro  YOB: 1990    Date of Creation: April 28, 2025  Date Treatment Plan Last Reviewed/Revised: April 28, 2025    DSM5 Diagnoses: 296.32 (F33.1) Major Depressive Disorder, Recurrent Episode, Moderate _ and With mixed features  300.02 (F41.1) Generalized Anxiety Disorder.  Psychosocial / Contextual Factors:  Interpersonal concerns, Financial strain.  Cultural and Contextual Factors: LGBTQ community   PROMIS (reviewed every 90 days): PROMIS-10 Scores        4/2/2025     2:59 PM 4/14/2025    12:45 PM 4/17/2025     7:52 AM   PROMIS-10 Total Score w/o Sub Scores   PROMIS TOTAL - SUBSCORES 23  24  23        Patient-reported        Referral / Collaboration:  Referral to another professional/service is not indicated at this time..    Anticipated number of session for this episode of care:  20-24  Anticipation frequency of session: Biweekly  Anticipated Duration of each session: 38-52 minutes  Treatment plan will be reviewed in 90 days or when goals have been changed.       MeasurableTreatment Goal(s) related to diagnosis / functional impairment(s)  Goal 1: Patient will address symptoms of depression     I will know I've met my goal when I am crafting on weekends, board game nights with friends, playing video games, reading, join a community band.      Objective #A (Patient Action)    Patient will Improve concentration, focus, and mindfulness in daily activities .  Status: New - Date: 4/28/25      Intervention(s)  Therapist will teach about ACT and contacting the present moment.    Objective #B  Patient will Increase interest, engagement, and pleasure in doing things.  Status: New - Date: 4/28/25      Intervention(s)  Therapist will assign  homework to engage in committed action and assertive communication skills  provide educational materials on values  role-play effective communication skills  teach committed action.    Objective #C  Patient will Decrease frequency and intensity of feeling down, depressed, hopeless  Identify negative self-talk and behaviors: challenge core beliefs, myths, and actions.  Status: New - Date: 4/28/25      Intervention(s)  Therapist will assign homework to engage in mindfulness of emotion and thought de-fusion  provide educational materials on thoughts and emotions  teach acceptance of emotion and thought de-fusion.      Patient has reviewed and agreed to the above plan.      Rosmery Phillips, [facility]  April 28, 2025

## 2025-05-15 ENCOUNTER — VIRTUAL VISIT (OUTPATIENT)
Facility: CLINIC | Age: 35
End: 2025-05-15
Payer: COMMERCIAL

## 2025-05-15 DIAGNOSIS — F32.1 MODERATE SINGLE CURRENT EPISODE OF MAJOR DEPRESSIVE DISORDER (H): Primary | ICD-10-CM

## 2025-05-15 DIAGNOSIS — F41.1 GAD (GENERALIZED ANXIETY DISORDER): ICD-10-CM

## 2025-05-15 NOTE — PROGRESS NOTES
[facility]                                     Progress Note    Patient Name: Celia Castro  Date: May 15, 2025         Service Type: Individual      Session Start Time: 8:00  Session End Time: 8:47     Session Length: 47    Session #: 2    Attendees: Client attended alone    Service Modality:  Video Visit:      Provider verified identity through the following two step process.  Patient provided:  Patient is known previously to provider and Patient was verified at admission/transfer    Telemedicine Visit: The patient's condition can be safely assessed and treated via synchronous audio and visual telemedicine encounter.      Reason for Telemedicine Visit: Patient has requested telehealth visit    Originating Site (Patient Location): Patient's home    Distant Site (Provider Location): Provider Remote Setting- Home Office    Consent:  The patient/guardian has verbally consented to: the potential risks and benefits of telemedicine (video visit) versus in person care; bill my insurance or make self-payment for services provided; and responsibility for payment of non-covered services.     Patient would like the video invitation sent by:  My Chart    Mode of Communication:  Video Conference via Amwell    Distant Location (Provider):  Off-site    As the provider I attest to compliance with applicable laws and regulations related to telemedicine.    DATA  Interactive Complexity: No  Crisis: No        Progress Since Last Session (Related to Symptoms / Goals / Homework):   Symptoms: Improving reports more energy and less sadness    Homework: Achieved / completed to satisfaction      Episode of Care Goals: Achieved / completed to satisfaction - CONTEMPLATION (Considering change and yet undecided); Intervened by assessing the negative and positive thinking (ambivalence) about behavior change  Satisfactory progress - PREPARATION (Decided to change - considering how); Intervened by negotiating a change plan  and determining options / strategies for behavior change, identifying triggers, exploring social supports, and working towards setting a date to begin behavior change     Current / Ongoing Stressors and Concerns:   Discussed struggles with perfectionism, vulnerability, acceptance of her emotions, allowing self to relax and communicating needs   [place] crossing in relationship which started them in couple's therapy   Derek from [place] and stress with contract negotiations   Political events   Planning wedding, moving and discussions around having kids  Struggles to understand her own anxiety - notes picking at nails as avoidance     Treatment Objective(s) Addressed in This Session:   Improve concentration, focus, and mindfulness in daily activities   Develop rapport     Intervention:   ACT: Continued to learn about patient and what she sees as her life struggles.  Explored how these struggles impact patient's life.  Began introducing psycho-education on emotions and patient's perception of her emotions.      Assessments completed prior to visit:  The following assessments were completed by patient for this visit:  PHQ9:       12/9/2021     4:30 PM 5/18/2022     3:04 PM 8/25/2022     1:04 PM 7/13/2023     1:55 PM 2/13/2025    10:55 AM 4/2/2025     2:49 PM 4/17/2025     7:50 AM   PHQ-9 SCORE   PHQ-9 Total Score MyChart 12 (Moderate depression)  8 (Mild depression)  10 (Moderate depression) 10 (Moderate depression) 8 (Mild depression)   PHQ-9 Total Score 12 10 8 7 10  10  8        Patient-reported    Proxy-reported     GAD7:       5/19/2021     5:03 PM 8/16/2021     1:40 AM 11/8/2021    10:29 AM 12/9/2021     4:30 PM 8/25/2022     1:04 PM 7/13/2023     1:55 PM 2/11/2025     9:38 AM   JUDAH-7 SCORE   Total Score  3 (minimal anxiety) 5 (mild anxiety) 7 (mild anxiety) 4 (minimal anxiety)  7 (mild anxiety)   Total Score 4 3 5 7 4 4 7        Patient-reported     PROMIS 10-Global Health (all questions and answers displayed):        12/9/2021     4:44 PM 4/2/2025     2:59 PM 4/14/2025    12:45 PM 4/17/2025     7:52 AM   PROMIS 10   In general, would you say your health is: Fair Fair Good Good   In general, would you say your quality of life is: Good Good Good Good   In general, how would you rate your physical health? Fair Fair Good Good   In general, how would you rate your mental health, including your mood and your ability to think? Fair Fair Fair Fair   In general, how would you rate your satisfaction with your social activities and relationships? Fair Good Good Fair   In general, please rate how well you carry out your usual social activities and roles Fair Good Good Good   To what extent are you able to carry out your everyday physical activities such as walking, climbing stairs, carrying groceries, or moving a chair? Completely Completely Completely Completely   In the past 7 days, how often have you been bothered by emotional problems such as feeling anxious, depressed, or irritable? Often Often Often Often   In the past 7 days, how would you rate your fatigue on average? Very severe Severe Severe Severe   In the past 7 days, how would you rate your pain on average, where 0 means no pain, and 10 means worst imaginable pain? 2 2 2 2   In general, would you say your health is: 2 2 3 3   In general, would you say your quality of life is: 3 3 3 3   In general, how would you rate your physical health? 2 2 3 3   In general, how would you rate your mental health, including your mood and your ability to think? 2 2 2 2   In general, how would you rate your satisfaction with your social activities and relationships? 2 3 3 2   In general, please rate how well you carry out your usual social activities and roles. (This includes activities at home, at work and in your community, and responsibilities as a parent, child, spouse, employee, friend, etc.) 2 3 3 3   To what extent are you able to carry out your everyday physical activities such as  walking, climbing stairs, carrying groceries, or moving a chair? 5 5 5 5   In the past 7 days, how often have you been bothered by emotional problems such as feeling anxious, depressed, or irritable? 4 4 4 4   In the past 7 days, how would you rate your fatigue on average? 5 4 4 4   In the past 7 days, how would you rate your pain on average, where 0 means no pain, and 10 means worst imaginable pain? 2 2 2 2   Global Mental Health Score 9 10  10  9    Global Physical Health Score 12 13  14  14    PROMIS TOTAL - SUBSCORES 21 23  24  23        Patient-reported         ASSESSMENT: Current Emotional / Mental Status (status of significant symptoms):   Risk status (Self / Other harm or suicidal ideation)   Patient denies current fears or concerns for personal safety.   Patient denies current or recent suicidal ideation or behaviors.   Patient denies current or recent homicidal ideation or behaviors.   Patient denies current or recent self injurious behavior or ideation.   Patient denies other safety concerns.   Patient reports there has been no change in risk factors since their last session.     Patient reports there has been no change in protective factors since their last session.     Recommended that patient call 911 or go to the local ED should there be a change in any of these risk factors     Appearance:   Appropriate    Eye Contact:   Good    Psychomotor Behavior: Normal    Attitude:   Cooperative  Interested   Orientation:   All   Speech    Rate / Production: Normal/ Responsive    Volume:  Normal    Mood:    Anxious    Affect:    Subdued    Thought Content:  Clear    Thought Form:  Coherent  Logical    Insight:    Good      Medication Review:   Changes to psychiatric medications, see updated Medication List in EPIC.      Medication Compliance:   Yes     Changes in Health Issues:   None reported     Chemical Use Review:   Substance Use: Chemical use reviewed, no active concerns identified      Tobacco Use: No  current tobacco use.      Diagnosis:  1. Moderate single current episode of major depressive disorder (H)    2. JUDAH (generalized anxiety disorder)          Collateral Reports Completed:   Not Applicable    PLAN: (Patient Tasks / Therapist Tasks / Other)  Discussed starting with values next session       ELVER Saldaña                                                         ______________________________________________________________________    Individual Treatment Plan    Patient's Name: Celia Castro  YOB: 1990    Date of Creation: April 28, 2025  Date Treatment Plan Last Reviewed/Revised: April 28, 2025    DSM5 Diagnoses: 296.32 (F33.1) Major Depressive Disorder, Recurrent Episode, Moderate _ and With mixed features  300.02 (F41.1) Generalized Anxiety Disorder.  Psychosocial / Contextual Factors:  Interpersonal concerns, Financial strain.  Cultural and Contextual Factors: LGBTQ community   PROMIS (reviewed every 90 days): PROMIS-10 Scores        4/2/2025     2:59 PM 4/14/2025    12:45 PM 4/17/2025     7:52 AM   PROMIS-10 Total Score w/o Sub Scores   PROMIS TOTAL - SUBSCORES 23  24  23        Patient-reported        Referral / Collaboration:  Referral to another professional/service is not indicated at this time..    Anticipated number of session for this episode of care: 20-24  Anticipation frequency of session: Biweekly  Anticipated Duration of each session: 38-52 minutes  Treatment plan will be reviewed in 90 days or when goals have been changed.       MeasurableTreatment Goal(s) related to diagnosis / functional impairment(s)  Goal 1: Patient will address symptoms of depression     I will know I've met my goal when I am crafting on weekends, board game nights with friends, playing video games, reading, join a community band.      Objective #A (Patient Action)    Patient will Improve concentration, focus, and mindfulness in daily activities .  Status: New - Date: 4/28/25      Intervention(s)  Therapist will teach about ACT and contacting the present moment.    Objective #B  Patient will Increase interest, engagement, and pleasure in doing things.  Status: New - Date: 4/28/25     Intervention(s)  Therapist will assign homework to engage in committed action and assertive communication skills  provide educational materials on values  role-play effective communication skills  teach committed action.    Objective #C  Patient will Decrease frequency and intensity of feeling down, depressed, hopeless  Identify negative self-talk and behaviors: challenge core beliefs, myths, and actions.  Status: New - Date: 4/28/25     Intervention(s)  Therapist will assign homework to engage in mindfulness of emotion and thought de-fusion  provide educational materials on thoughts and emotions  teach acceptance of emotion and thought de-fusion.      Patient has reviewed and agreed to the above plan.      Rosmery Phillips, [facility]  April 28, 2025

## 2025-05-29 ENCOUNTER — VIRTUAL VISIT (OUTPATIENT)
Dept: PSYCHIATRY | Facility: CLINIC | Age: 35
End: 2025-05-29
Payer: COMMERCIAL

## 2025-05-29 ENCOUNTER — VIRTUAL VISIT (OUTPATIENT)
Facility: CLINIC | Age: 35
End: 2025-05-29
Payer: COMMERCIAL

## 2025-05-29 DIAGNOSIS — F32.1 MODERATE SINGLE CURRENT EPISODE OF MAJOR DEPRESSIVE DISORDER (H): Primary | ICD-10-CM

## 2025-05-29 DIAGNOSIS — F41.1 GAD (GENERALIZED ANXIETY DISORDER): ICD-10-CM

## 2025-05-29 PROCEDURE — 90834 PSYTX W PT 45 MINUTES: CPT | Mod: 95 | Performed by: SOCIAL WORKER

## 2025-05-29 RX ORDER — SERTRALINE HYDROCHLORIDE 100 MG/1
150 TABLET, FILM COATED ORAL DAILY
Qty: 135 TABLET | Refills: 0 | Status: SHIPPED | OUTPATIENT
Start: 2025-05-29

## 2025-05-29 ASSESSMENT — PATIENT HEALTH QUESTIONNAIRE - PHQ9
10. IF YOU CHECKED OFF ANY PROBLEMS, HOW DIFFICULT HAVE THESE PROBLEMS MADE IT FOR YOU TO DO YOUR WORK, TAKE CARE OF THINGS AT HOME, OR GET ALONG WITH OTHER PEOPLE: SOMEWHAT DIFFICULT
SUM OF ALL RESPONSES TO PHQ QUESTIONS 1-9: 9
SUM OF ALL RESPONSES TO PHQ QUESTIONS 1-9: 9

## 2025-05-29 ASSESSMENT — PAIN SCALES - GENERAL: PAINLEVEL_OUTOF10: NO PAIN (0)

## 2025-05-29 NOTE — PATIENT INSTRUCTIONS
Treatment Plan:  Continue lorazepam/Ativan every 8 hours as needed for severe anxiety.  Increase sertraline/Zoloft to 150 mg daily for depression and anxiety.   See "[employer]" message for more information on ketamine and TMS  Continue therapy as planned.  Continue all other cares per primary care provider.   Continue all other medications as reviewed per electronic medical record today.   Safety plan reviewed. To the Emergency Department as needed or call after hours crisis line at [phone] or [phone]. [place] Crisis Text Line. Text [place] to 625674 or Suicide LifeLine Chat: suicidepreventionCentrifuge Systemsline.org/chat  Schedule an appointment with me in 6 weeks or sooner as needed. Call [facility] at [phone] to schedule.  Follow up with primary care provider as planned or for acute medical concerns.  Call the psychiatric nurse line with medication questions or concerns at [phone].  "[employer] may be used to communicate with your provider, but this is not intended to be used for emergencies.    Risks of benzodiazepine (Ativan, Xanax, Klonopin, Valium, etc) use including, but not limited to, sedation, tolerance, risk for addiction/dependence. Do not drink alcohol while taking benzodiazepines due to risk of trouble breathing and potential death. Do not drive or operate heavy machinery until it is known how the drug affects you. Discuss with physician or pharmacist before ever taking a benzodiazepine with a narcotic/opioid pain medication.       Patient Education   Collaborative Care Psychiatry Service  What to Expect  Here's what to expect from your Collaborative Care Psychiatry Service (CCPS).   About CCPS  CCPS means 2 people work together to help you get better. You'll meet with a behavioral health clinician and a psychiatric doctor. A behavioral health clinician helps people with mental health problems by talking with them. A psychiatric doctor helps people by giving them medicine.  How it  "works  At every visit, you'll see the behavioral health clinician (BHC) first. They'll talk with you about how you're doing and teach you how to feel better.   Then you'll see the psychiatric doctor. This doctor can help you deal with troubling thoughts and feelings by giving you medicine. They'll make sure you know the plan for your care.   CCPS usually takes 3 to 6 visits. If you need more visits, we may have you start seeing a different psychiatric doctor for ongoing care.  If you have any questions or concerns, we'll be glad to talk with you.  About visits  Be open  At your visits, please talk openly about your problems. It may feel hard, but it's the best way for us to help you.  Cancelling visits  If you can't come to your visit, please call us right away at [phone]. If you don't cancel at least 24 hours (1 full day) before your visit, that's \"late cancellation.\"  Being late to visits  Being very late is the same as not showing up. You will be a \"no show\" if:  Your appointment starts with a BHC, and you're more than 15 minutes late for a 30-minute (half hour) visit. This will also cancel your appointment with the psychiatric doctor.  Your appointment is with a psychiatric doctor only, and you're more than 15 minutes late for a 30-minute (half hour) visit.  Your appointment is with a psychiatric doctor only, and you're more than 30 minutes late for a 60-minute (full hour) visit.  If you cancel late or don't show up 2 times within 6 months, we may end your care.   Getting help between visits  If you need help between visits, you can call us Monday to Friday from 8 a.m. to 4:30 p.m. at [phone].  Emergency care  Call 911 or go to the nearest emergency department if your life or someone else's life is in danger.  Call 068 anytime to reach the national Suicide and Crisis hotline.  Medicine refills  To refill your medicine, call your pharmacy. You can also call [facility]'s Behavioral Access at " [phone], Monday to Friday, 8 a.m. to 4:30 p.m. It can take 1 to 3 business days to get a refill.   Forms, letters, and tests  You may have papers to fill out, like FMLA, short-term disability, and workability. We can help you with these forms at your visits, but you must have an appointment. You may need more than 1 visit for this, to be in an intensive therapy program, or both.  Before we can give you medicine for ADHD, we may refer you to get tested for it or confirm it another way.  We may not be able to give you an emotional support animal letter.  We don't do mental health checks ordered by the court.   We don't do mental health testing, but we can refer you to get tested.   Thank you for choosing us for your care.  For informational purposes only. Not to replace the advice of your health care provider. Copyright   2022 [facility]. All rights reserved. [employer] [id number] - Rev 11/24.

## 2025-05-29 NOTE — PROGRESS NOTES
[facility]                                     Progress Note    Patient Name: Ceila Castro  Date: May 29, 2025         Service Type: Individual      Session Start Time: 8:00  Session End Time: 8:49     Session Length: 49    Session #: 3    Attendees: Client attended alone    Service Modality:  Video Visit:      Provider verified identity through the following two step process.  Patient provided:  Patient is known previously to provider and Patient was verified at admission/transfer    Telemedicine Visit: The patient's condition can be safely assessed and treated via synchronous audio and visual telemedicine encounter.      Reason for Telemedicine Visit: Patient has requested telehealth visit    Originating Site (Patient Location): Patient's home    Distant Site (Provider Location): Provider Remote Setting- Home Office    Consent:  The patient/guardian has verbally consented to: the potential risks and benefits of telemedicine (video visit) versus in person care; bill my insurance or make self-payment for services provided; and responsibility for payment of non-covered services.     Patient would like the video invitation sent by:  My Chart    Mode of Communication:  Video Conference via [employer]    Distant Location (Provider):  Off-site    As the provider I attest to compliance with applicable laws and regulations related to telemedicine.    DATA  Interactive Complexity: No  Crisis: No        Progress Since Last Session (Related to Symptoms / Goals / Homework):   Symptoms: Worsening noticing emotions up and down    Homework: Achieved / completed to satisfaction      Episode of Care Goals: Achieved / completed to satisfaction - CONTEMPLATION (Considering change and yet undecided); Intervened by assessing the negative and positive thinking (ambivalence) about behavior change  Satisfactory progress - PREPARATION (Decided to change - considering how); Intervened by negotiating a change plan and  determining options / strategies for behavior change, identifying triggers, exploring social supports, and working towards setting a date to begin behavior change     Current / Ongoing Stressors and Concerns:   Discussed struggles with perfectionism, vulnerability, acceptance of her emotions, allowing self to relax and communicating needs   [place] in relationship which started them in couple's therapy   [place] from [place] and stress with contract negotiations   Political events   Planning wedding, moving and discussions around having kids  Struggles to understand her own anxiety - notes picking at nails as avoidance     Treatment Objective(s) Addressed in This Session:   Improve concentration, focus, and mindfulness in daily activities      Intervention:   ACT: Discussed with patient short-term/long-term effects of ways she is helping herself when she is struggling.  Discussed with patient reflecting on values, had patient name values and encouraged patient to see what valued action looks like.   Values: care, support, empower, teaching, compassion, connection, respect    Assessments completed prior to visit:  The following assessments were completed by patient for this visit:  PHQ9:       5/18/2022     3:04 PM 8/25/2022     1:04 PM 7/13/2023     1:55 PM 2/13/2025    10:55 AM 4/2/2025     2:49 PM 4/17/2025     7:50 AM 5/29/2025     7:57 AM   PHQ-9 SCORE   PHQ-9 Total Score MyChart  8 (Mild depression)  10 (Moderate depression) 10 (Moderate depression) 8 (Mild depression) 9 (Mild depression)   PHQ-9 Total Score 10 8 7 10  10  8  9        Patient-reported    Proxy-reported     GAD7:       5/19/2021     5:03 PM 8/16/2021     1:40 AM 11/8/2021    10:29 AM 12/9/2021     4:30 PM 8/25/2022     1:04 PM 7/13/2023     1:55 PM 2/11/2025     9:38 AM   JUDAH-7 SCORE   Total Score  3 (minimal anxiety) 5 (mild anxiety) 7 (mild anxiety) 4 (minimal anxiety)  7 (mild anxiety)   Total Score 4 3 5 7 4 4 7         Patient-reported     PROMIS 10-Global Health (all questions and answers displayed):       12/9/2021     4:44 PM 4/2/2025     2:59 PM 4/14/2025    12:45 PM 4/17/2025     7:52 AM   PROMIS 10   In general, would you say your health is: Fair Fair Good Good   In general, would you say your quality of life is: Good Good Good Good   In general, how would you rate your physical health? Fair Fair Good Good   In general, how would you rate your mental health, including your mood and your ability to think? Fair Fair Fair Fair   In general, how would you rate your satisfaction with your social activities and relationships? Fair Good Good Fair   In general, please rate how well you carry out your usual social activities and roles Fair Good Good Good   To what extent are you able to carry out your everyday physical activities such as walking, climbing stairs, carrying groceries, or moving a chair? Completely Completely Completely Completely   In the past 7 days, how often have you been bothered by emotional problems such as feeling anxious, depressed, or irritable? Often Often Often Often   In the past 7 days, how would you rate your fatigue on average? Very severe Severe Severe Severe   In the past 7 days, how would you rate your pain on average, where 0 means no pain, and 10 means worst imaginable pain? 2 2 2 2   In general, would you say your health is: 2 2 3 3   In general, would you say your quality of life is: 3 3 3 3   In general, how would you rate your physical health? 2 2 3 3   In general, how would you rate your mental health, including your mood and your ability to think? 2 2 2 2   In general, how would you rate your satisfaction with your social activities and relationships? 2 3 3 2   In general, please rate how well you carry out your usual social activities and roles. (This includes activities at home, at work and in your community, and responsibilities as a parent, child, spouse, employee, friend, etc.) 2 3 3 3    To what extent are you able to carry out your everyday physical activities such as walking, climbing stairs, carrying groceries, or moving a chair? 5 5 5 5   In the past 7 days, how often have you been bothered by emotional problems such as feeling anxious, depressed, or irritable? 4 4 4 4   In the past 7 days, how would you rate your fatigue on average? 5 4 4 4   In the past 7 days, how would you rate your pain on average, where 0 means no pain, and 10 means worst imaginable pain? 2 2 2 2   Global Mental Health Score 9 10  10  9    Global Physical Health Score 12 13  14  14    PROMIS TOTAL - SUBSCORES 21 23  24  23        Patient-reported         ASSESSMENT: Current Emotional / Mental Status (status of significant symptoms):   Risk status (Self / Other harm or suicidal ideation)   Patient denies current fears or concerns for personal safety.   Patient denies current or recent suicidal ideation or behaviors.   Patient denies current or recent homicidal ideation or behaviors.   Patient denies current or recent self injurious behavior or ideation.   Patient denies other safety concerns.   Patient reports there has been no change in risk factors since their last session.     Patient reports there has been no change in protective factors since their last session.     Recommended that patient call 911 or go to the local ED should there be a change in any of these risk factors     Appearance:   Appropriate    Eye Contact:   Good    Psychomotor Behavior: Normal    Attitude:   Cooperative  Interested   Orientation:   All   Speech    Rate / Production: Normal/ Responsive    Volume:  Normal    Mood:    hopeful   Affect:    Subdued    Thought Content:  Clear    Thought Form:  Coherent  Logical    Insight:    Good      Medication Review:   No changes to current psychiatric medication(s)     Medication Compliance:   Yes     Changes in Health Issues:   None reported     Chemical Use Review:   Substance Use: Chemical use  reviewed, no active concerns identified      Tobacco Use: No current tobacco use.      Diagnosis:  1. Moderate single current episode of major depressive disorder (H)    2. JUDAH (generalized anxiety disorder)            Collateral Reports Completed:   Not Applicable    PLAN: (Patient Tasks / Therapist Tasks / Other)  Connection, care, compassion - take valued action      Rosmery AvilesGiannaKamran, REJISW                                                         ______________________________________________________________________    Individual Treatment Plan    Patient's Name: Celia Castro  YOB: 1990    Date of Creation: April 28, 2025  Date Treatment Plan Last Reviewed/Revised: April 28, 2025    DSM5 Diagnoses: 296.32 (F33.1) Major Depressive Disorder, Recurrent Episode, Moderate _ and With mixed features  300.02 (F41.1) Generalized Anxiety Disorder.  Psychosocial / Contextual Factors:  Interpersonal concerns, Financial strain.  Cultural and Contextual Factors: LGBTQ community   PROMIS (reviewed every 90 days): PROMIS-10 Scores        4/2/2025     2:59 PM 4/14/2025    12:45 PM 4/17/2025     7:52 AM   PROMIS-10 Total Score w/o Sub Scores   PROMIS TOTAL - SUBSCORES 23  24  23        Patient-reported        Referral / Collaboration:  Referral to another professional/service is not indicated at this time..    Anticipated number of session for this episode of care: 20-24  Anticipation frequency of session: Biweekly  Anticipated Duration of each session: 38-52 minutes  Treatment plan will be reviewed in 90 days or when goals have been changed.       MeasurableTreatment Goal(s) related to diagnosis / functional impairment(s)  Goal 1: Patient will address symptoms of depression     I will know I've met my goal when I am crafting on weekends, board game nights with friends, playing video games, reading, join a community band.      Objective #A (Patient Action)    Patient will Improve concentration, focus, and  mindfulness in daily activities .  Status: New - Date: 4/28/25     Intervention(s)  Therapist will teach about ACT and contacting the present moment.    Objective #B  Patient will Increase interest, engagement, and pleasure in doing things.  Status: New - Date: 4/28/25     Intervention(s)  Therapist will assign homework to engage in committed action and assertive communication skills  provide educational materials on values  role-play effective communication skills  teach committed action.    Objective #C  Patient will Decrease frequency and intensity of feeling down, depressed, hopeless  Identify negative self-talk and behaviors: challenge core beliefs, myths, and actions.  Status: New - Date: 4/28/25     Intervention(s)  Therapist will assign homework to engage in mindfulness of emotion and thought de-fusion  provide educational materials on thoughts and emotions  teach acceptance of emotion and thought de-fusion.      Patient has reviewed and agreed to the above plan.      Rosmery Phillips, ELVER  April 28, 2025

## 2025-05-29 NOTE — PROGRESS NOTES
"Telemedicine Visit: The patient's condition can be safely assessed and treated via synchronous audio and visual telemedicine encounter.      Reason for Telemedicine Visit: Patient has requested telehealth visit    Originating Site (Patient Location): Patient's home      Distant Location (provider location):  Off-site    Consent:  The patient/guardian has verbally consented to: the potential risks and benefits of telemedicine (video visit) versus in person care; bill my insurance or make self-payment for services provided; and responsibility for payment of non-covered services.     Mode of Communication:  Video Conference via [employer]    As the provider I attest to compliance with applicable laws and regulations related to telemedicine.         Outpatient Psychiatric Progress Note    Name: Celia Castro   : 1990                    Primary Care Provider: Rissa Serrano MD   Therapist:  intake schedule therapy . Couples counseling, [facility] (out of pocket). Hx of EMDR      PHQ-9 scores:      2025     2:49 PM 2025     7:50 AM 2025     7:57 AM   PHQ-9 SCORE   PHQ-9 Total Score MyChart 10 (Moderate depression) 8 (Mild depression) 9 (Mild depression)   PHQ-9 Total Score 10  8  9        Patient-reported       JUDAH-7 scores:      2022     1:04 PM 2023     1:55 PM 2025     9:38 AM   JUDAH-7 SCORE   Total Score 4 (minimal anxiety)  7 (mild anxiety)   Total Score 4 4 7        Patient-reported       Patient Identification:  Patient is a 34 year old, partnered / significant other  White Not  or  female  who presents for return visit with me.  Patient is currently employed full time. Patient attended the phone/video session alone. Patient prefers to be called: \"Celia\".    Interim History:  I last saw Celia LANDRUM Caseymana for outpatient psychiatry Consultation on 2025. During that appointment, we:    Continue lorazepam/Ativan every 8 hours as needed for " severe anxiety.  Discontinue fluoxetine/Prozac and start sertraline/Zoloft:  Days 1-7  Decrease fluoxetine by alternating 40 with 80 mg every other day  Days 8-14  Decrease fluoxetine to 40 mg daily  Start sertraline 50 mg daily  Days 15-21  Decrease fluoxetine to 20 mg daily  Increase sertraline to 100 mg daily  Days 16+  Stop fluoxetine  Continue on sertraline 100 mg daily   Okay to increase up to 150 mg daily after a couple weeks on 100 mg daily if still room for improvement and if tolerating well  See [employer] message for more information on ketamine and TMS  Continue therapy as planned.    5/29: Overall doing okay.  Transition off fluoxetine and onto sertraline went fairly well.  No major negative side effects.  Noticing mood has been a little more up-and-down.  So far manageable and patient thinks related to change in medication.  Does think she has had a little bit more energy and feels like getting out of bed each day is a little easier.  Sleep has been more or less the same.  Appetite has been the same.  Off Prozac completely for about a month.  Currently taking sertraline 100 mg daily.  No changes in drug or alcohol use status.  No safety concerns or thoughts of suicide since last visit.  Continues in psychotherapy.    Per [facility] [place], Yareli Wilson, [facility], during today's team-based visit:  No [facility] [place] visit today    Past Psychiatric Med Trials:  Psych Meds at Intake:  Fluoxetine - 80 mg daily - on for over 10 years, on 80 mg for at least 6 years, no side effects, low libido    Ativan 0.5 mg - once a month usually and if really anxious and on verge of panic, sedation but no other side effects      Past Psych Meds:  paxil - gained weight and didn't like side effects   Trazodone - helped fall asleep but still very tired during   Buspar - likely ineffective   Wellbutrin - tried as adjunct to prozac   Ambien - doesn't remember   Venlafaxine immediate release - only 37.5 mg and felt sad at bedtime     Psychiatric ROS:  See HPI  above for all pertinent positives and negatives. Rest of systems negative.     PHQ9 and GAD7 scores were reviewed today if completed.   Medication side effects: Denies any major  Current stressors include: Symptoms and see HPI above  Coping mechanisms and supports include: Therapy, Family, Hobbies, and Friends    Current medications include:   Current Outpatient Medications   Medication Sig Dispense Refill    acyclovir (ZOVIRAX) 400 MG tablet Take 1 tablet (400 mg) by mouth daily. 1 tab twice daily during outbreak 90 tablet 3    acyclovir (ZOVIRAX) 400 MG tablet Take 1 tablet (400 mg) by mouth daily. , 1 tab twice daily during outbreak 60 tablet 0    FLUoxetine (PROZAC) 20 MG capsule Take 1 capsule (20 mg) by mouth daily. 10 capsule 0    levonorgestrel (KYLEENA) 19.5 MG IUD 1 each by Intrauterine route once      LORazepam (ATIVAN) 0.5 MG tablet Take 1 tablet (0.5 mg) by mouth every 8 hours as needed for anxiety For additional refills, please schedule a follow-up appointment at [phone] 30 tablet 0    Multiple Vitamins-Minerals (MULTIVITAMIN ADULT PO)       ondansetron (ZOFRAN ODT) 4 MG ODT tab TAKE 1-2 TABLETS BY MOUTH EVERY 8 HOURS AS NEEDED FOR NAUSEA. 9 tablet 13    sertraline (ZOLOFT) 100 MG tablet Take 0.5 tablets (50 mg) by mouth daily for 7 days, THEN 1 tablet (100 mg) daily. 87 tablet 0     No current facility-administered medications for this visit.       The [place] Prescription Monitoring Program has been reviewed and there are no concerns about diversionary activity for controlled substances at this time.   06/20/2024 06/19/2024 1 Lorazepam 0.5 Mg Tablet 30.00 10 Mo Ove [id number] Gra (3253) 0/0 1.50 LME Comm Ins [place]       Past Medical/Surgical History:  Past Medical History:   Diagnosis Date    Major depressive disorder, single episode, moderate (H) 9/07      has a past medical history of Major depressive disorder, single episode, moderate (H) (9/07).    She has no past medical history of Arthritis,  Cancer (H), Cerebral infarction (H), Congestive heart failure (H), COPD (chronic obstructive pulmonary disease) (H), Diabetes (H), Heart disease, History of blood transfusion, Hypertension, Thyroid disease, or Uncomplicated asthma.    Social History:  Reviewed. No changes to social history except as noted above in HPI.    Vital Signs:   None. This is phone/video visit.     Labs:  Most recent laboratory results reviewed and no new labs.     Review of Systems:  10 systems (general, cardiovascular, respiratory, eyes, ENT, endocrine, GI, , M/S, neurological) were reviewed. Most pertinent finding(s) is/are: fatigue. The remaining systems are all unremarkable.    Mental Status Examination (limited as this is by phone/video):  Appearance: Awake, alert, appears stated age, no acute distress, well-groomed   Attitude:  cooperative  Motor: No gross abnormalities observed via video, not formally tested   Oriented to:  person, place, time, and situation  Attention Span and Concentration:  normal  Speech:  clear, coherent, regular rate, rhythm, and volume  Language: intact  Mood:  A little up-and-down  Affect:  appropriate and in normal range and mood congruent  Associations:  no loose associations  Thought Process:  logical, linear and goal oriented  Thought Content:  no evidence of suicidal ideation or homicidal ideation, no evidence of psychotic thought, no auditory hallucinations present and no visual hallucinations present  Recent and Remote Memory:  Intact to interview. Not formally assessed. No amnesia.  Fund of Knowledge: appropriate  Insight:  good  Judgment:  intact, adequate for safety  Impulse Control:  intact    Suicide Risk Assessment:  Today Celia Castro reports no suicidal ideation. Based on all available evidence including the factors cited above, Celia Castro does not appear to be at imminent risk for self-harm, does not meet criteria for a 72-hr hold, and therefore remains appropriate for ongoing outpatient  level of care.  A thorough assessment of risk factors related to suicide and self-harm have been reviewed and are noted above. The patient convincingly denies suicidality on several occasions. Local community safety resources reviewed for patient to use if needed. There was no deceit detected, and the patient presented in a manner that was believable.     DSM5 Diagnosis:  296.32 (F33.1) Major Depressive Disorder, Recurrent Episode, Moderate _  300.02 (F41.1) Generalized Anxiety Disorder    Medical comorbidities include:   Patient Active Problem List    Diagnosis Date Noted    Moderate single current episode of major depressive disorder (H) 07/11/2017     Priority: Medium    Anxiety 12/15/2010     Priority: Medium     4/11/11 JUDAH score 9      CARDIOVASCULAR SCREENING; LDL GOAL LESS THAN 160 10/31/2010     Priority: Medium       Psychosocial & Contextual Factors: see HPI above    Assessment:  From Intake, 4/17/2025:  Celia Castro is a 34-year-old with past psychiatric history including depression and anxiety who presents today for psychiatric evaluation in the context of worsening depression symptoms.  Patient feels like depression symptoms have been worsening more recently.  Increasing fatigue and worsening pre-existing sleep problems.  Patient has been on fluoxetine for over a decade and has been on max recommended dose for at least several years.  Patient with brief, and likely inadequate, trial with Wellbutrin as an adjunct to Prozac.  Given fluoxetine likely no longer conferring much benefit, patient agreeable to changing to a new antidepressant.  Patient with possible pregnancy plan soon and so we discussed sertraline as a good option.  Patient agreeable to start sertraline.  Could consider Viibryd as a backup option, although we did discuss there is limited data in humans for pregnancy.  Patient would also like to look into options like ketamine and TMS so resources sent today in [employer].  Rare use of  lorazepam.  No concerning drug or alcohol use.  No suicidal ideation or safety concerns.     5/29/2025:   Patient overall starting to notice some slight improvements with sertraline.  Currently taking 100 mg daily.  A little bit of mood lability possibly due to to transition to sertraline versus lower dose of sertraline and had been on higher dose of Prozac.  Patient is agreeable to increasing sertraline to 150 mg daily to see if further helpful.  No current pregnancy.  No problematic drug or alcohol use.  No acute safety concerns.  No SI.  Continues in psychotherapy.    Medication side effects and alternatives were reviewed. Health promotion activities recommended and reviewed today. All questions addressed. Education and counseling completed regarding risks and benefits of medications and psychotherapy options. Recommend therapy for additional support.     Treatment Plan:  Continue lorazepam/Ativan every 8 hours as needed for severe anxiety.  Increase sertraline/Zoloft to 150 mg daily for depression and anxiety.   See [employer] message for more information on ketamine and TMS  Continue therapy as planned.  Continue all other cares per primary care provider.   Continue all other medications as reviewed per electronic medical record today.   Safety plan reviewed. To the Emergency Department as needed or call after hours crisis line at [phone] or [phone]. [place] Crisis Text Line. Text [place] to 361789 or Suicide LifeLine Chat: suicidepreventionlifeline.org/chat  Schedule an appointment with me in 6 weeks or sooner as needed. Call [facility] at [phone] to schedule.  Follow up with primary care provider as planned or for acute medical concerns.  Call the psychiatric nurse line with medication questions or concerns at [phone].  [employer] may be used to communicate with your provider, but this is not intended to be used for emergencies.    Risks of benzodiazepine (Ativan, Xanax, Klonopin,  Valium, etc) use including, but not limited to, sedation, tolerance, risk for addiction/dependence. Do not drink alcohol while taking benzodiazepines due to risk of trouble breathing and potential death. Do not drive or operate heavy machinery until it is known how the drug affects you. Discuss with physician or pharmacist before ever taking a benzodiazepine with a narcotic/opioid pain medication.     Administrative Billing:   Phone Call/Video Duration: 8 Minutes  Start: 3:05p  Stop: 3:13p    The longitudinal plan of care for the diagnosis(es)/condition(s) as documented were addressed during this visit. Due to the added complexity in care, I will continue to support Celia in the subsequent management and with ongoing continuity of care.    Episode of Care #: 3    Patient Status:  Patient will continue to be seen for ongoing consultation and stabilization.  No [facility] ok'd at 5/29 appt and on.     Signed:   Verónica Harding DO  [facility]    Disclaimer: This note consists of symbols derived from keyboarding, dictation and/or voice recognition software. As a result, there may be errors in the script that have gone undetected. Please consider this when interpreting information found in this chart.

## 2025-06-08 ASSESSMENT — PATIENT HEALTH QUESTIONNAIRE - PHQ9
SUM OF ALL RESPONSES TO PHQ QUESTIONS 1-9: 7
10. IF YOU CHECKED OFF ANY PROBLEMS, HOW DIFFICULT HAVE THESE PROBLEMS MADE IT FOR YOU TO DO YOUR WORK, TAKE CARE OF THINGS AT HOME, OR GET ALONG WITH OTHER PEOPLE: SOMEWHAT DIFFICULT
SUM OF ALL RESPONSES TO PHQ QUESTIONS 1-9: 7

## 2025-06-09 ENCOUNTER — OFFICE VISIT (OUTPATIENT)
Facility: CLINIC | Age: 35
End: 2025-06-09
Payer: COMMERCIAL

## 2025-06-09 DIAGNOSIS — F32.1 MODERATE SINGLE CURRENT EPISODE OF MAJOR DEPRESSIVE DISORDER (H): Primary | ICD-10-CM

## 2025-06-09 DIAGNOSIS — F41.1 GAD (GENERALIZED ANXIETY DISORDER): ICD-10-CM

## 2025-06-09 PROCEDURE — 90834 PSYTX W PT 45 MINUTES: CPT | Performed by: SOCIAL WORKER

## 2025-06-09 NOTE — PROGRESS NOTES
[facility]                                     Progress Note    Patient Name: Celia Castro  Date: June 9, 2025         Service Type: Individual      Session Start Time: 11:03  Session End Time: 11:45     Session Length: 42    Session #: 4    Attendees: Client attended alone    Service Modality:  In-person    DATA  Interactive Complexity: No  Crisis: No        Progress Since Last Session (Related to Symptoms / Goals / Homework):   Symptoms: No change continuing to struggle with energy    Homework: Did not complete      Episode of Care Goals: Achieved / completed to satisfaction - CONTEMPLATION (Considering change and yet undecided); Intervened by assessing the negative and positive thinking (ambivalence) about behavior change  Satisfactory progress - PREPARATION (Decided to change - considering how); Intervened by negotiating a change plan and determining options / strategies for behavior change, identifying triggers, exploring social supports, and working towards setting a date to begin behavior change     Current / Ongoing Stressors and Concerns:   Discussed struggles with perfectionism, vulnerability, acceptance of her emotions, allowing self to relax and communicating needs   [place] in relationship which started them in couple's therapy   Derek from [place] and stress with contract negotiations   Political events   Planning wedding, moving and discussions around having kids  Struggles to understand her own anxiety - notes picking at nails as avoidance     Treatment Objective(s) Addressed in This Session:   Improve concentration, focus, and mindfulness in daily activities      Intervention:   ACT: Discussed with patient what led to not completing homework.  Explored with patient hesitancy about therapy.  Patient and writer explored moving forward to build trust.  Patient named more about their experience and struggles.  Discussed with patient connection between her struggles and  therapy process.  Patient and writer discussed what patient was willing to do in moving forward in therapy.   Values: care, support, empower, teaching, compassion, connection, respect    Assessments completed prior to visit:  The following assessments were completed by patient for this visit:  PHQ9:       8/25/2022     1:04 PM 7/13/2023     1:55 PM 2/13/2025    10:55 AM 4/2/2025     2:49 PM 4/17/2025     7:50 AM 5/29/2025     7:57 AM 6/8/2025     6:28 PM   PHQ-9 SCORE   PHQ-9 Total Score MyChart 8 (Mild depression)  10 (Moderate depression) 10 (Moderate depression) 8 (Mild depression) 9 (Mild depression) 7 (Mild depression)   PHQ-9 Total Score 8 7 10  10  8  9  7        Patient-reported    Proxy-reported     GAD7:       5/19/2021     5:03 PM 8/16/2021     1:40 AM 11/8/2021    10:29 AM 12/9/2021     4:30 PM 8/25/2022     1:04 PM 7/13/2023     1:55 PM 2/11/2025     9:38 AM   JUDAH-7 SCORE   Total Score  3 (minimal anxiety) 5 (mild anxiety) 7 (mild anxiety) 4 (minimal anxiety)  7 (mild anxiety)   Total Score 4 3 5 7 4 4 7        Patient-reported     PROMIS 10-Global Health (all questions and answers displayed):       12/9/2021     4:44 PM 4/2/2025     2:59 PM 4/14/2025    12:45 PM 4/17/2025     7:52 AM   PROMIS 10   In general, would you say your health is: Fair Fair Good Good   In general, would you say your quality of life is: Good Good Good Good   In general, how would you rate your physical health? Fair Fair Good Good   In general, how would you rate your mental health, including your mood and your ability to think? Fair Fair Fair Fair   In general, how would you rate your satisfaction with your social activities and relationships? Fair Good Good Fair   In general, please rate how well you carry out your usual social activities and roles Fair Good Good Good   To what extent are you able to carry out your everyday physical activities such as walking, climbing stairs, carrying groceries, or moving a chair? Completely  Completely Completely Completely   In the past 7 days, how often have you been bothered by emotional problems such as feeling anxious, depressed, or irritable? Often Often Often Often   In the past 7 days, how would you rate your fatigue on average? Very severe Severe Severe Severe   In the past 7 days, how would you rate your pain on average, where 0 means no pain, and 10 means worst imaginable pain? 2 2 2 2   In general, would you say your health is: 2 2 3 3   In general, would you say your quality of life is: 3 3 3 3   In general, how would you rate your physical health? 2 2 3 3   In general, how would you rate your mental health, including your mood and your ability to think? 2 2 2 2   In general, how would you rate your satisfaction with your social activities and relationships? 2 3 3 2   In general, please rate how well you carry out your usual social activities and roles. (This includes activities at home, at work and in your community, and responsibilities as a parent, child, spouse, employee, friend, etc.) 2 3 3 3   To what extent are you able to carry out your everyday physical activities such as walking, climbing stairs, carrying groceries, or moving a chair? 5 5 5 5   In the past 7 days, how often have you been bothered by emotional problems such as feeling anxious, depressed, or irritable? 4 4 4 4   In the past 7 days, how would you rate your fatigue on average? 5 4 4 4   In the past 7 days, how would you rate your pain on average, where 0 means no pain, and 10 means worst imaginable pain? 2 2 2 2   Global Mental Health Score 9 10  10  9    Global Physical Health Score 12 13  14  14    PROMIS TOTAL - SUBSCORES 21 23  24  23        Patient-reported         ASSESSMENT: Current Emotional / Mental Status (status of significant symptoms):   Risk status (Self / Other harm or suicidal ideation)   Patient denies current fears or concerns for personal safety.   Patient denies current or recent suicidal ideation  or behaviors.   Patient denies current or recent homicidal ideation or behaviors.   Patient denies current or recent self injurious behavior or ideation.   Patient denies other safety concerns.   Patient reports there has been no change in risk factors since their last session.     Patient reports there has been no change in protective factors since their last session.     Recommended that patient call 911 or go to the local ED should there be a change in any of these risk factors     Appearance:   Appropriate    Eye Contact:   Good    Psychomotor Behavior: Normal    Attitude:   Cooperative  Interested   Orientation:   All   Speech    Rate / Production: Normal/ Responsive    Volume:  Normal    Mood:    Anxious    Affect:    Subdued    Thought Content:  Clear    Thought Form:  Coherent  Logical    Insight:    Good      Medication Review:   No changes to current psychiatric medication(s)     Medication Compliance:   Yes     Changes in Health Issues:   None reported     Chemical Use Review:   Substance Use: Chemical use reviewed, no active concerns identified      Tobacco Use: No current tobacco use.      Diagnosis:  1. Moderate single current episode of major depressive disorder (H)    2. JUDAH (generalized anxiety disorder)        Collateral Reports Completed:   Not Applicable    PLAN: (Patient Tasks / Therapist Tasks / Other)  Journal her emotions      ELVER Saldaña                                                         ______________________________________________________________________    Individual Treatment Plan    Patient's Name: Celia Castro  YOB: 1990    Date of Creation: April 28, 2025  Date Treatment Plan Last Reviewed/Revised: April 28, 2025    DSM5 Diagnoses: 296.32 (F33.1) Major Depressive Disorder, Recurrent Episode, Moderate _ and With mixed features  300.02 (F41.1) Generalized Anxiety Disorder.  Psychosocial / Contextual Factors:  Interpersonal concerns, Financial  strain.  Cultural and Contextual Factors: LGBTQ community   PROMIS (reviewed every 90 days): PROMIS-10 Scores        4/2/2025     2:59 PM 4/14/2025    12:45 PM 4/17/2025     7:52 AM   PROMIS-10 Total Score w/o Sub Scores   PROMIS TOTAL - SUBSCORES 23  24  23        Patient-reported        Referral / Collaboration:  Referral to another professional/service is not indicated at this time..    Anticipated number of session for this episode of care: 20-24  Anticipation frequency of session: Biweekly  Anticipated Duration of each session: 38-52 minutes  Treatment plan will be reviewed in 90 days or when goals have been changed.       MeasurableTreatment Goal(s) related to diagnosis / functional impairment(s)  Goal 1: Patient will address symptoms of depression     I will know I've met my goal when I am crafting on weekends, board game nights with friends, playing video games, reading, join a community band.      Objective #A (Patient Action)    Patient will Improve concentration, focus, and mindfulness in daily activities .  Status: New - Date: 4/28/25     Intervention(s)  Therapist will teach about ACT and contacting the present moment.    Objective #B  Patient will Increase interest, engagement, and pleasure in doing things.  Status: New - Date: 4/28/25     Intervention(s)  Therapist will assign homework to engage in committed action and assertive communication skills  provide educational materials on values  role-play effective communication skills  teach committed action.    Objective #C  Patient will Decrease frequency and intensity of feeling down, depressed, hopeless  Identify negative self-talk and behaviors: challenge core beliefs, myths, and actions.  Status: New - Date: 4/28/25     Intervention(s)  Therapist will assign homework to engage in mindfulness of emotion and thought de-fusion  provide educational materials on thoughts and emotions  teach acceptance of emotion and thought de-fusion.      Patient has  reviewed and agreed to the above plan.      Rosmery Phillips, [facility]  April 28, 2025

## 2025-06-22 ASSESSMENT — PATIENT HEALTH QUESTIONNAIRE - PHQ9
10. IF YOU CHECKED OFF ANY PROBLEMS, HOW DIFFICULT HAVE THESE PROBLEMS MADE IT FOR YOU TO DO YOUR WORK, TAKE CARE OF THINGS AT HOME, OR GET ALONG WITH OTHER PEOPLE: SOMEWHAT DIFFICULT
SUM OF ALL RESPONSES TO PHQ QUESTIONS 1-9: 7
SUM OF ALL RESPONSES TO PHQ QUESTIONS 1-9: 7

## 2025-06-23 ENCOUNTER — OFFICE VISIT (OUTPATIENT)
Facility: CLINIC | Age: 35
End: 2025-06-23
Payer: COMMERCIAL

## 2025-06-23 DIAGNOSIS — F41.1 GAD (GENERALIZED ANXIETY DISORDER): ICD-10-CM

## 2025-06-23 DIAGNOSIS — F32.1 MODERATE SINGLE CURRENT EPISODE OF MAJOR DEPRESSIVE DISORDER (H): Primary | ICD-10-CM

## 2025-06-23 PROCEDURE — 90834 PSYTX W PT 45 MINUTES: CPT | Performed by: SOCIAL WORKER

## 2025-06-23 NOTE — PROGRESS NOTES
[facility]                                     Progress Note    Patient Name: Celia Castro  Date: June 23, 2025         Service Type: Individual      Session Start Time: 12:01  Session End Time: 12:50     Session Length: 50    Session #: 5    Attendees: Client attended alone    Service Modality:  In-person    DATA  Interactive Complexity: No  Crisis: No        Progress Since Last Session (Related to Symptoms / Goals / Homework):   Symptoms: Improving has had more energy this week    Homework: Achieved / completed to satisfaction      Episode of Care Goals: Achieved / completed to satisfaction - PREPARATION (Decided to change - considering how); Intervened by negotiating a change plan and determining options / strategies for behavior change, identifying triggers, exploring social supports, and working towards setting a date to begin behavior change  Satisfactory progress - ACTION (Actively working towards change); Intervened by reinforcing change plan / affirming steps taken     Current / Ongoing Stressors and Concerns:   Discussed struggles with perfectionism, vulnerability, acceptance of her emotions, allowing self to relax and communicating needs   [place] in relationship which started them in couple's therapy   [place] from [place] and stress with contract negotiations   Political events   Planning wedding, moving and discussions around having kids  Struggles to understand her own anxiety - notes picking at nails as avoidance     Treatment Objective(s) Addressed in This Session:   Improve concentration, focus, and mindfulness in daily activities      Intervention:   ACT: Discussed what patient has noticed with her emotions.  Patient noted wanting to write more about what she is feeling through out the day.  Patient discussed struggles with tolerating difficult emotions and how she uses control to not have to feel them.  Values: care, support, empower, teaching, compassion,  connection, respect    Assessments completed prior to visit:  The following assessments were completed by patient for this visit:  PHQ9:       7/13/2023     1:55 PM 2/13/2025    10:55 AM 4/2/2025     2:49 PM 4/17/2025     7:50 AM 5/29/2025     7:57 AM 6/8/2025     6:28 PM 6/22/2025    10:09 PM   PHQ-9 SCORE   PHQ-9 Total Score MyChart  10 (Moderate depression) 10 (Moderate depression) 8 (Mild depression) 9 (Mild depression) 7 (Mild depression) 7 (Mild depression)   PHQ-9 Total Score 7 10  10  8  9  7  7        Patient-reported    Proxy-reported     GAD7:       5/19/2021     5:03 PM 8/16/2021     1:40 AM 11/8/2021    10:29 AM 12/9/2021     4:30 PM 8/25/2022     1:04 PM 7/13/2023     1:55 PM 2/11/2025     9:38 AM   JUDAH-7 SCORE   Total Score  3 (minimal anxiety) 5 (mild anxiety) 7 (mild anxiety) 4 (minimal anxiety)  7 (mild anxiety)   Total Score 4 3 5 7 4 4 7        Patient-reported     PROMIS 10-Global Health (all questions and answers displayed):       12/9/2021     4:44 PM 4/2/2025     2:59 PM 4/14/2025    12:45 PM 4/17/2025     7:52 AM   PROMIS 10   In general, would you say your health is: Fair Fair Good Good   In general, would you say your quality of life is: Good Good Good Good   In general, how would you rate your physical health? Fair Fair Good Good   In general, how would you rate your mental health, including your mood and your ability to think? Fair Fair Fair Fair   In general, how would you rate your satisfaction with your social activities and relationships? Fair Good Good Fair   In general, please rate how well you carry out your usual social activities and roles Fair Good Good Good   To what extent are you able to carry out your everyday physical activities such as walking, climbing stairs, carrying groceries, or moving a chair? Completely Completely Completely Completely   In the past 7 days, how often have you been bothered by emotional problems such as feeling anxious, depressed, or irritable?  Often Often Often Often   In the past 7 days, how would you rate your fatigue on average? Very severe Severe Severe Severe   In the past 7 days, how would you rate your pain on average, where 0 means no pain, and 10 means worst imaginable pain? 2 2 2 2   In general, would you say your health is: 2 2 3 3   In general, would you say your quality of life is: 3 3 3 3   In general, how would you rate your physical health? 2 2 3 3   In general, how would you rate your mental health, including your mood and your ability to think? 2 2 2 2   In general, how would you rate your satisfaction with your social activities and relationships? 2 3 3 2   In general, please rate how well you carry out your usual social activities and roles. (This includes activities at home, at work and in your community, and responsibilities as a parent, child, spouse, employee, friend, etc.) 2 3 3 3   To what extent are you able to carry out your everyday physical activities such as walking, climbing stairs, carrying groceries, or moving a chair? 5 5 5 5   In the past 7 days, how often have you been bothered by emotional problems such as feeling anxious, depressed, or irritable? 4 4 4 4   In the past 7 days, how would you rate your fatigue on average? 5 4 4 4   In the past 7 days, how would you rate your pain on average, where 0 means no pain, and 10 means worst imaginable pain? 2 2 2 2   Global Mental Health Score 9 10  10  9    Global Physical Health Score 12 13  14  14    PROMIS TOTAL - SUBSCORES 21 23  24  23        Patient-reported         ASSESSMENT: Current Emotional / Mental Status (status of significant symptoms):   Risk status (Self / Other harm or suicidal ideation)   Patient denies current fears or concerns for personal safety.   Patient denies current or recent suicidal ideation or behaviors.   Patient denies current or recent homicidal ideation or behaviors.   Patient denies current or recent self injurious behavior or  ideation.   Patient denies other safety concerns.   Patient reports there has been no change in risk factors since their last session.     Patient reports there has been no change in protective factors since their last session.     Recommended that patient call 911 or go to the local ED should there be a change in any of these risk factors     Appearance:   Appropriate    Eye Contact:   Good    Psychomotor Behavior: Normal    Attitude:   Cooperative  Interested   Orientation:   All   Speech    Rate / Production: Normal/ Responsive    Volume:  Normal    Mood:    Euthymic   Affect:    Subdued    Thought Content:  Clear    Thought Form:  Coherent  Logical    Insight:    Good      Medication Review:   No changes to current psychiatric medication(s)     Medication Compliance:   Yes     Changes in Health Issues:   None reported     Chemical Use Review:   Substance Use: Chemical use reviewed, no active concerns identified      Tobacco Use: No current tobacco use.      Diagnosis:  1. Moderate single current episode of major depressive disorder (H)    2. JUDAH (generalized anxiety disorder)          Collateral Reports Completed:   Not Applicable    PLAN: (Patient Tasks / Therapist Tasks / Other)  Journal about emotions she is experience throughout the day to process her experiences      ELVER Saldaña                                                         ______________________________________________________________________    Individual Treatment Plan    Patient's Name: Celia Castro  YOB: 1990    Date of Creation: April 28, 2025  Date Treatment Plan Last Reviewed/Revised: April 28, 2025    DSM5 Diagnoses: 296.32 (F33.1) Major Depressive Disorder, Recurrent Episode, Moderate _ and With mixed features  300.02 (F41.1) Generalized Anxiety Disorder.  Psychosocial / Contextual Factors:  Interpersonal concerns, Financial strain.  Cultural and Contextual Factors: LGBTQ community   PROMIS (reviewed every  90 days): PROMIS-10 Scores        4/2/2025     2:59 PM 4/14/2025    12:45 PM 4/17/2025     7:52 AM   PROMIS-10 Total Score w/o Sub Scores   PROMIS TOTAL - SUBSCORES 23  24  23        Patient-reported        Referral / Collaboration:  Referral to another professional/service is not indicated at this time..    Anticipated number of session for this episode of care: 20-24  Anticipation frequency of session: Biweekly  Anticipated Duration of each session: 38-52 minutes  Treatment plan will be reviewed in 90 days or when goals have been changed.       MeasurableTreatment Goal(s) related to diagnosis / functional impairment(s)  Goal 1: Patient will address symptoms of depression     I will know I've met my goal when I am crafting on weekends, board game nights with friends, playing video games, reading, join a community band.      Objective #A (Patient Action)    Patient will Improve concentration, focus, and mindfulness in daily activities .  Status: New - Date: 4/28/25     Intervention(s)  Therapist will teach about ACT and contacting the present moment.    Objective #B  Patient will Increase interest, engagement, and pleasure in doing things.  Status: New - Date: 4/28/25     Intervention(s)  Therapist will assign homework to engage in committed action and assertive communication skills  provide educational materials on values  role-play effective communication skills  teach committed action.    Objective #C  Patient will Decrease frequency and intensity of feeling down, depressed, hopeless  Identify negative self-talk and behaviors: challenge core beliefs, myths, and actions.  Status: New - Date: 4/28/25     Intervention(s)  Therapist will assign homework to engage in mindfulness of emotion and thought de-fusion  provide educational materials on thoughts and emotions  teach acceptance of emotion and thought de-fusion.      Patient has reviewed and agreed to the above plan.      Rosmery Phillips, [facility]  April 28,  2025

## 2025-07-15 ENCOUNTER — VIRTUAL VISIT (OUTPATIENT)
Dept: PSYCHIATRY | Facility: CLINIC | Age: 35
End: 2025-07-15
Payer: COMMERCIAL

## 2025-07-15 DIAGNOSIS — G47.00 INSOMNIA, UNSPECIFIED TYPE: ICD-10-CM

## 2025-07-15 DIAGNOSIS — F33.41 RECURRENT MAJOR DEPRESSIVE DISORDER, IN PARTIAL REMISSION: Primary | ICD-10-CM

## 2025-07-15 DIAGNOSIS — F41.1 GAD (GENERALIZED ANXIETY DISORDER): ICD-10-CM

## 2025-07-15 PROCEDURE — 1126F AMNT PAIN NOTED NONE PRSNT: CPT | Performed by: PSYCHIATRY & NEUROLOGY

## 2025-07-15 PROCEDURE — 98006 SYNCH AUDIO-VIDEO EST MOD 30: CPT | Performed by: PSYCHIATRY & NEUROLOGY

## 2025-07-15 RX ORDER — SERTRALINE HYDROCHLORIDE 100 MG/1
200 TABLET, FILM COATED ORAL DAILY
Qty: 180 TABLET | Refills: 0 | Status: SHIPPED | OUTPATIENT
Start: 2025-07-15

## 2025-07-15 ASSESSMENT — PAIN SCALES - GENERAL: PAINLEVEL_OUTOF10: NO PAIN (0)

## 2025-07-15 ASSESSMENT — PATIENT HEALTH QUESTIONNAIRE - PHQ9: SUM OF ALL RESPONSES TO PHQ QUESTIONS 1-9: 8

## 2025-07-15 NOTE — PROGRESS NOTES
"Telemedicine Visit: The patient's condition can be safely assessed and treated via synchronous audio and visual telemedicine encounter.      Reason for Telemedicine Visit: Patient has requested telehealth visit    Originating Site (Patient Location): Patient's home      Distant Location (provider location):  Off-site    Consent:  The patient/guardian has verbally consented to: the potential risks and benefits of telemedicine (video visit) versus in person care; bill my insurance or make self-payment for services provided; and responsibility for payment of non-covered services.     Mode of Communication:  Video Conference via [employer]    As the provider I attest to compliance with applicable laws and regulations related to telemedicine.         Outpatient Psychiatric Progress Note    Name: Celia Castro   : 1990                    Primary Care Provider: Rissa Serrano MD   Therapist: ELVER Saldaña; couples counseling, [facility] (out of pocket). Hx of EMDR      PHQ-9 scores:      2025     6:28 PM 2025    10:09 PM 7/15/2025    10:54 AM   PHQ-9 SCORE   PHQ-9 Total Score MyChart 7 (Mild depression) 7 (Mild depression)    PHQ-9 Total Score 7  7  8       Patient-reported       JUDAH-7 scores:      2022     1:04 PM 2023     1:55 PM 2025     9:38 AM   JUDAH-7 SCORE   Total Score 4 (minimal anxiety)  7 (mild anxiety)   Total Score 4 4 7        Patient-reported       Patient Identification:  Patient is a 34 year old, partnered / significant other  White Not  or  female  who presents for return visit with me.  Patient is currently employed full time. Patient attended the phone/video session alone. Patient prefers to be called: \"Celia\".    Interim History:  I last saw Celia LANDRUM Caseymana for outpatient psychiatry return visit on 2025. During that appointment, we:    Continue lorazepam/Ativan every 8 hours as needed for severe anxiety.  Increase " sertraline/Zoloft to 150 mg daily for depression and anxiety.   See [employer] message for more information on ketamine and TMS  Continue therapy as planned.    7/15: Patient doing okay.  Still feels like sertraline has been helpful, mostly for depression.  Still room to improve anxiety.  Sleep also continues to be a bit disrupted.  Often struggling with both sleep initiation and maintenance.  Typically wakes up unrefreshed and fatigued.  Saw sleep medicine in 2022.  Unremarkable polysomnography at that time.  Ongoing low energy and fatigue.  Nov 8th is her wedding.  Patient does feel like ongoing sleep struggles still a result of shift work even though she no longer works overnights.  W currently works 12 hour days 2-3 days a week.  Her bedtime when working is typically between 9-10p and not working tries to be in bed by midnight or sometimes as late as 3a (that late only happens a couple times a month though).  Will sometimes take naps.  No acute safety concerns.  No acute suicidality.  No problematic drug or alcohol use.  Continues in psychotherapy.    Per [facility], Yareli Wilson [facility], during today's team-based visit:  No [facility] visit    Past Psychiatric Med Trials:  Psych Meds at Intake:  Fluoxetine - 80 mg daily - on for over 10 years, on 80 mg for at least 6 years, no side effects, low libido    Ativan 0.5 mg - once a month usually and if really anxious and on verge of panic, sedation but no other side effects      Past Psych Meds:  paxil - gained weight and didn't like side effects   Trazodone - helped fall asleep but still very tired during day  Buspar - likely ineffective   Wellbutrin - tried as adjunct to prozac   Ambien - doesn't remember   Venlafaxine immediate release - only 37.5 mg and felt sad at bedtime     Psychiatric ROS:  See HPI above for all pertinent positives and negatives. Rest of systems negative.     PHQ9 and GAD7 scores were reviewed today if completed.   Medication side effects: Denies any  major  Current stressors include: Symptoms and see HPI above  Coping mechanisms and supports include: Therapy, Family, Hobbies, and Friends    Current medications include:   Current Outpatient Medications   Medication Sig Dispense Refill    acyclovir (ZOVIRAX) 400 MG tablet Take 1 tablet (400 mg) by mouth daily. 1 tab twice daily during outbreak 90 tablet 3    acyclovir (ZOVIRAX) 400 MG tablet Take 1 tablet (400 mg) by mouth daily. , 1 tab twice daily during outbreak 60 tablet 0    levonorgestrel (KYLEENA) 19.5 MG IUD 1 each by Intrauterine route once      LORazepam (ATIVAN) 0.5 MG tablet Take 1 tablet (0.5 mg) by mouth every 8 hours as needed for anxiety For additional refills, please schedule a follow-up appointment at [phone] 30 tablet 0    Multiple Vitamins-Minerals (MULTIVITAMIN ADULT PO)       ondansetron (ZOFRAN ODT) 4 MG ODT tab TAKE 1-2 TABLETS BY MOUTH EVERY 8 HOURS AS NEEDED FOR NAUSEA. 9 tablet 13    sertraline (ZOLOFT) 100 MG tablet Take 1.5 tablets (150 mg) by mouth daily. 135 tablet 0     No current facility-administered medications for this visit.       The [place] Prescription Monitoring Program has been reviewed and there are no concerns about diversionary activity for controlled substances at this time.   06/20/2024 06/19/2024 1 Lorazepam 0.5 Mg Tablet 30.00 10 Mo Ove [id number] Gra (3253) 0/0 1.50 LME Comm Ins [place]       Past Medical/Surgical History:  Past Medical History:   Diagnosis Date    Major depressive disorder, single episode, moderate (H) 9/07      has a past medical history of Major depressive disorder, single episode, moderate (H) (9/07).    She has no past medical history of Arthritis, Cancer (H), Cerebral infarction (H), Congestive heart failure (H), COPD (chronic obstructive pulmonary disease) (H), Diabetes (H), Heart disease, History of blood transfusion, Hypertension, Thyroid disease, or Uncomplicated asthma.    Social History:  Reviewed. No changes to social history except as  noted above in HPI.    Vital Signs:   None. This is phone/video visit.     Labs:  Most recent laboratory results reviewed and no new labs.     Review of Systems:  10 systems (general, cardiovascular, respiratory, eyes, ENT, endocrine, GI, , M/S, neurological) were reviewed. Most pertinent finding(s) is/are: fatigue. The remaining systems are all unremarkable.    Mental Status Examination (limited as this is by phone/video):  Appearance: Awake, alert, appears stated age, no acute distress, well-groomed   Attitude:  cooperative  Motor: No gross abnormalities observed via video, not formally tested   Oriented to:  person, place, time, and situation  Attention Span and Concentration:  normal  Speech:  clear, coherent, regular rate, rhythm, and volume  Language: intact  Mood: A little better, anxiety still high  Affect:  appropriate and in normal range and mood congruent  Associations:  no loose associations  Thought Process:  logical, linear and goal oriented  Thought Content:  no evidence of suicidal ideation or homicidal ideation, no evidence of psychotic thought, no auditory hallucinations present and no visual hallucinations present  Recent and Remote Memory:  Intact to interview. Not formally assessed. No amnesia.  Fund of Knowledge: appropriate  Insight:  good  Judgment:  intact, adequate for safety  Impulse Control:  intact    Suicide Risk Assessment:  Today Celia Castro reports no suicidal ideation. Based on all available evidence including the factors cited above, Celia Castro does not appear to be at imminent risk for self-harm, does not meet criteria for a 72-hr hold, and therefore remains appropriate for ongoing outpatient level of care.  A thorough assessment of risk factors related to suicide and self-harm have been reviewed and are noted above. The patient convincingly denies suicidality on several occasions. Local community safety resources reviewed for patient to use if needed. There was no deceit  detected, and the patient presented in a manner that was believable.     DSM5 Diagnosis:  Major Depressive Disorder, Recurrent Episode, in partial remission  300.02 (F41.1) Generalized Anxiety Disorder  Insomnia, unspecified    Medical comorbidities include:   Patient Active Problem List    Diagnosis Date Noted    Moderate single current episode of major depressive disorder (H) 07/11/2017     Priority: Medium    Anxiety 12/15/2010     Priority: Medium     4/11/11 JUDAH score 9      CARDIOVASCULAR SCREENING; LDL GOAL LESS THAN 160 10/31/2010     Priority: Medium       Psychosocial & Contextual Factors: see HPI above    Assessment:  From Intake, 4/17/2025:  Celia Castro is a 34-year-old with past psychiatric history including depression and anxiety who presents today for psychiatric evaluation in the context of worsening depression symptoms.  Patient feels like depression symptoms have been worsening more recently.  Increasing fatigue and worsening pre-existing sleep problems.  Patient has been on fluoxetine for over a decade and has been on max recommended dose for at least several years.  Patient with brief, and likely inadequate, trial with Wellbutrin as an adjunct to Prozac.  Given fluoxetine likely no longer conferring much benefit, patient agreeable to changing to a new antidepressant.  Patient with possible pregnancy plan soon and so we discussed sertraline as a good option.  Patient agreeable to start sertraline.  Could consider Viibryd as a backup option, although we did discuss there is limited data in humans for pregnancy.  Patient would also like to look into options like ketamine and TMS so resources sent today in [employer].  Rare use of lorazepam.  No concerning drug or alcohol use.  No suicidal ideation or safety concerns.     5/29/2025:   Patient overall starting to notice some slight improvements with sertraline.  Currently taking 100 mg daily.  A little bit of mood lability possibly due to to transition  to sertraline versus lower dose of sertraline and had been on higher dose of Prozac.  Patient is agreeable to increasing sertraline to 150 mg daily to see if further helpful.  No current pregnancy.  No problematic drug or alcohol use.  No acute safety concerns.  No SI.  Continues in psychotherapy.    7/15/2025:  Patient doing okay.  Still feels like sertraline has been helpful, mostly for depression.  Still room to improve anxiety.  Sleep also continues to be a bit disrupted.  Ongoing low energy and fatigue.  Patient agreeable to increasing sertraline dose to 200 mg daily.  Patient also agreeable to trial with amitriptyline at bedtime as needed for sleep.  We will pivot to doxepin if amitriptyline ineffective or poorly tolerated.  Could also consider clonidine as an option.  If sleep continues to be difficult to target, could consider trial with modafinil as an augment to sertraline for depression to further help with fatigue and sedation.  No acute safety concerns.  No acute suicidality.  No problematic drug or alcohol use.  Continues in psychotherapy.    Medication side effects and alternatives were reviewed. Health promotion activities recommended and reviewed today. All questions addressed. Education and counseling completed regarding risks and benefits of medications and psychotherapy options. Recommend therapy for additional support.     Treatment Plan:  Continue lorazepam/Ativan 0.5 mg every 8 hours as needed for severe anxiety.  Increase sertraline/Zoloft to 200 mg daily for depression and anxiety.   Start amitriptyline 25 mg at bedtime as needed for sleep.   If ineffective or poorly tolerated, discussed ok to reach out and we can change to doxepin trial.   See [employer] message sent previously if interested in more information on ketamine and TMS.  Continue therapy as planned.  Continue all other cares per primary care provider.   Continue all other medications as reviewed per electronic medical record today.    Safety plan reviewed. To the Emergency Department as needed or call after hours crisis line at [phone] or [phone]. [place] Crisis Text Line. Text [place] to 795247 or Suicide LifeLine Chat: suicidepreventionlifeline.org/chat  Schedule an appointment with me in 1 month or sooner as needed. Call [facility] at [phone] to schedule.  Follow up with primary care provider as planned or for acute medical concerns.  Call the psychiatric nurse line with medication questions or concerns at [phone].  [employer] may be used to communicate with your provider, but this is not intended to be used for emergencies.    Risks of benzodiazepine (Ativan, Xanax, Klonopin, Valium, etc) use including, but not limited to, sedation, tolerance, risk for addiction/dependence. Do not drink alcohol while taking benzodiazepines due to risk of trouble breathing and potential death. Do not drive or operate heavy machinery until it is known how the drug affects you. Discuss with physician or pharmacist before ever taking a benzodiazepine with a narcotic/opioid pain medication.     Administrative Billing:   Phone Call/Video Duration: 11 Minutes  Start: 11:01a  Stop: 11:12a    The longitudinal plan of care for the diagnosis(es)/condition(s) as documented were addressed during this visit. Due to the added complexity in care, I will continue to support Celia in the subsequent management and with ongoing continuity of care.    Episode of Care #: 3    Patient Status:  Patient will continue to be seen for ongoing consultation and stabilization.  No [facility] ok'd at 5/29 appt and on.     Signed:   Verónica Harding DO  [facility]    Disclaimer: This note consists of symbols derived from keyboarding, dictation and/or voice recognition software. As a result, there may be errors in the script that have gone undetected. Please consider this when interpreting information found in this chart.

## 2025-07-15 NOTE — PROGRESS NOTES
"Virtual Visit Details    Type of service:  Video Visit     Originating Location (pt. Location): {video visit patient location:086494::\"Home\"}  {PROVIDER LOCATION On-site should be selected for visits conducted from your clinic location or adjoining [facility] hospital, academic office, or other nearby [facility] building. Off-site should be selected for all other provider locations, including home:939678}  Distant Location (provider location):  {virtual location provider:218167}  Platform used for Video Visit: {Virtual Visit Platforms:391633::\"Stickybits\"}          "

## 2025-07-15 NOTE — PATIENT INSTRUCTIONS
Treatment Plan:  Continue lorazepam/Ativan 0.5 mg every 8 hours as needed for severe anxiety.  Increase sertraline/Zoloft to 200 mg daily for depression and anxiety.   Start amitriptyline 25 mg at bedtime as needed for sleep.   If ineffective or poorly tolerated, discussed ok to reach out and we can change to doxepin trial.   See Blink[employer] message sent previously if interested in more information on ketamine and TMS.  Continue therapy as planned.  Continue all other cares per primary care provider.   Continue all other medications as reviewed per electronic medical record today.   Safety plan reviewed. To the Emergency Department as needed or call after hours crisis line at [phone] or [phone]. [place] Crisis Text Line. Text [place] to 330912 or Suicide LifeLine Chat: suicidepreventionLookmashline.org/chat  Schedule an appointment with me in 1 month or sooner as needed. Call [facility] at [phone] to schedule.  Follow up with primary care provider as planned or for acute medical concerns.  Call the psychiatric nurse line with medication questions or concerns at [phone].  Blinkiverse may be used to communicate with your provider, but this is not intended to be used for emergencies.    Risks of benzodiazepine (Ativan, Xanax, Klonopin, Valium, etc) use including, but not limited to, sedation, tolerance, risk for addiction/dependence. Do not drink alcohol while taking benzodiazepines due to risk of trouble breathing and potential death. Do not drive or operate heavy machinery until it is known how the drug affects you. Discuss with physician or pharmacist before ever taking a benzodiazepine with a narcotic/opioid pain medication.     Patient Education   Collaborative Care Psychiatry Service  What to Expect  Here's what to expect from your Collaborative Care Psychiatry Service (CCPS).   About CCPS  CCPS means 2 people work together to help you get better. You'll meet with a behavioral health  "clinician and a psychiatric doctor. A behavioral health clinician helps people with mental health problems by talking with them. A psychiatric doctor helps people by giving them medicine.  How it works  At every visit, you'll see the behavioral health clinician (BHC) first. They'll talk with you about how you're doing and teach you how to feel better.   Then you'll see the psychiatric doctor. This doctor can help you deal with troubling thoughts and feelings by giving you medicine. They'll make sure you know the plan for your care.   CCPS usually takes 3 to 6 visits. If you need more visits, we may have you start seeing a different psychiatric doctor for ongoing care.  If you have any questions or concerns, we'll be glad to talk with you.  About visits  Be open  At your visits, please talk openly about your problems. It may feel hard, but it's the best way for us to help you.  Cancelling visits  If you can't come to your visit, please call us right away at [phone]. If you don't cancel at least 24 hours (1 full day) before your visit, that's \"late cancellation.\"  Being late to visits  Being very late is the same as not showing up. You will be a \"no show\" if:  Your appointment starts with a BHC, and you're more than 15 minutes late for a 30-minute (half hour) visit. This will also cancel your appointment with the psychiatric doctor.  Your appointment is with a psychiatric doctor only, and you're more than 15 minutes late for a 30-minute (half hour) visit.  Your appointment is with a psychiatric doctor only, and you're more than 30 minutes late for a 60-minute (full hour) visit.  If you cancel late or don't show up 2 times within 6 months, we may end your care.   Getting help between visits  If you need help between visits, you can call us Monday to Friday from 8 a.m. to 4:30 p.m. at [phone].  Emergency care  Call 911 or go to the nearest emergency department if your life or someone else's life is in " danger.  Call 988 anytime to reach the national Suicide and Crisis hotline.  Medicine refills  To refill your medicine, call your pharmacy. You can also call [facility]'s [facility] at [phone], Monday to Friday, 8 a.m. to 4:30 p.m. It can take 1 to 3 business days to get a refill.   Forms, letters, and tests  You may have papers to fill out, like FMLA, short-term disability, and workability. We can help you with these forms at your visits, but you must have an appointment. You may need more than 1 visit for this, to be in an intensive therapy program, or both.  Before we can give you medicine for ADHD, we may refer you to get tested for it or confirm it another way.  We may not be able to give you an emotional support animal letter.  We don't do mental health checks ordered by the court.   We don't do mental health testing, but we can refer you to get tested.   Thank you for choosing us for your care.  For informational purposes only. Not to replace the advice of your health care provider. Copyright   2022 [facility]. All rights reserved. OrderUp 227277 - Rev 11/24.

## 2025-07-15 NOTE — NURSING NOTE
Is the patient currently in the state of [place]? YES    Current patient location: [facility] [address]  [facility] [address]    Visit mode:Video    If the visit is dropped, the patient can be reconnected by: VIDEO VISIT:  Send e-mail to at [email or web address]    Will anyone else be joining the visit? No  (If patient encounters technical issues they should call [phone])    Are changes needed to the allergy or medication list? Pt stated no changes to allergies and Pt stated no med changes    Are refills needed on medications prescribed by this physician? No    Rooming Documentation: Questionnaire(s) completed.    Reason for visit: RECHECK     Martha Huitron, VVF

## 2025-07-28 ENCOUNTER — MYC REFILL (OUTPATIENT)
Dept: PEDIATRICS | Facility: CLINIC | Age: 35
End: 2025-07-28
Payer: COMMERCIAL

## 2025-07-28 DIAGNOSIS — R11.0 NAUSEA: ICD-10-CM

## 2025-07-28 RX ORDER — ONDANSETRON 4 MG/1
TABLET, ORALLY DISINTEGRATING ORAL
Qty: 9 TABLET | Refills: 3 | Status: SHIPPED | OUTPATIENT
Start: 2025-07-28

## 2025-08-03 ASSESSMENT — PATIENT HEALTH QUESTIONNAIRE - PHQ9: SUM OF ALL RESPONSES TO PHQ QUESTIONS 1-9: 6

## 2025-08-04 ENCOUNTER — VIRTUAL VISIT (OUTPATIENT)
Facility: CLINIC | Age: 35
End: 2025-08-04
Payer: COMMERCIAL

## 2025-08-04 DIAGNOSIS — F32.1 MODERATE SINGLE CURRENT EPISODE OF MAJOR DEPRESSIVE DISORDER (H): Primary | ICD-10-CM

## 2025-08-04 DIAGNOSIS — F41.1 GAD (GENERALIZED ANXIETY DISORDER): ICD-10-CM

## 2025-08-04 PROCEDURE — 90834 PSYTX W PT 45 MINUTES: CPT | Mod: 95 | Performed by: SOCIAL WORKER

## 2025-08-04 ASSESSMENT — PATIENT HEALTH QUESTIONNAIRE - PHQ9
SUM OF ALL RESPONSES TO PHQ QUESTIONS 1-9: 6
10. IF YOU CHECKED OFF ANY PROBLEMS, HOW DIFFICULT HAVE THESE PROBLEMS MADE IT FOR YOU TO DO YOUR WORK, TAKE CARE OF THINGS AT HOME, OR GET ALONG WITH OTHER PEOPLE: SOMEWHAT DIFFICULT

## 2025-08-11 ENCOUNTER — PATIENT OUTREACH (OUTPATIENT)
Dept: CARE COORDINATION | Facility: CLINIC | Age: 35
End: 2025-08-11
Payer: COMMERCIAL

## 2025-08-25 ENCOUNTER — PATIENT OUTREACH (OUTPATIENT)
Dept: CARE COORDINATION | Facility: CLINIC | Age: 35
End: 2025-08-25
Payer: COMMERCIAL